# Patient Record
Sex: FEMALE | Race: WHITE | NOT HISPANIC OR LATINO | Employment: FULL TIME | URBAN - METROPOLITAN AREA
[De-identification: names, ages, dates, MRNs, and addresses within clinical notes are randomized per-mention and may not be internally consistent; named-entity substitution may affect disease eponyms.]

---

## 2017-01-05 ENCOUNTER — TELEPHONE (OUTPATIENT)
Dept: SURGERY | Facility: CLINIC | Age: 52
End: 2017-01-05

## 2017-01-05 DIAGNOSIS — N64.4 NIPPLE PAIN: Primary | ICD-10-CM

## 2017-02-02 ENCOUNTER — TRANSCRIBE ORDERS (OUTPATIENT)
Dept: ADMINISTRATIVE | Facility: HOSPITAL | Age: 52
End: 2017-02-02

## 2017-02-02 ENCOUNTER — HOSPITAL ENCOUNTER (OUTPATIENT)
Dept: CARDIOLOGY | Facility: HOSPITAL | Age: 52
Discharge: HOME | End: 2017-02-02
Attending: INTERNAL MEDICINE | Admitting: INTERNAL MEDICINE

## 2017-02-02 DIAGNOSIS — M79.605 LEFT LEG PAIN: Primary | ICD-10-CM

## 2017-02-02 DIAGNOSIS — M79.605 LEFT LEG PAIN: ICD-10-CM

## 2017-02-02 LAB
BH CV LOWER VASCULAR LEFT COMMON FEMORAL AUGMENT: NORMAL
BH CV LOWER VASCULAR LEFT COMMON FEMORAL COMPETENT: NORMAL
BH CV LOWER VASCULAR LEFT COMMON FEMORAL COMPRESS: NORMAL
BH CV LOWER VASCULAR LEFT COMMON FEMORAL PHASIC: NORMAL
BH CV LOWER VASCULAR LEFT COMMON FEMORAL SPONT: NORMAL
BH CV LOWER VASCULAR LEFT DISTAL FEMORAL COMPRESS: NORMAL
BH CV LOWER VASCULAR LEFT GASTRONEMIUS COMPRESS: NORMAL
BH CV LOWER VASCULAR LEFT GREATER SAPH AK COMPRESS: NORMAL
BH CV LOWER VASCULAR LEFT GREATER SAPH BK COMPRESS: NORMAL
BH CV LOWER VASCULAR LEFT LESSER SAPH COMPRESS: NORMAL
BH CV LOWER VASCULAR LEFT MID FEMORAL AUGMENT: NORMAL
BH CV LOWER VASCULAR LEFT MID FEMORAL COMPETENT: NORMAL
BH CV LOWER VASCULAR LEFT MID FEMORAL COMPRESS: NORMAL
BH CV LOWER VASCULAR LEFT MID FEMORAL PHASIC: NORMAL
BH CV LOWER VASCULAR LEFT MID FEMORAL SPONT: NORMAL
BH CV LOWER VASCULAR LEFT PERONEAL COMPRESS: NORMAL
BH CV LOWER VASCULAR LEFT POPLITEAL AUGMENT: NORMAL
BH CV LOWER VASCULAR LEFT POPLITEAL COMPETENT: NORMAL
BH CV LOWER VASCULAR LEFT POPLITEAL COMPRESS: NORMAL
BH CV LOWER VASCULAR LEFT POPLITEAL PHASIC: NORMAL
BH CV LOWER VASCULAR LEFT POPLITEAL SPONT: NORMAL
BH CV LOWER VASCULAR LEFT POSTERIOR TIBIAL COMPRESS: NORMAL
BH CV LOWER VASCULAR LEFT PROXIMAL FEMORAL COMPRESS: NORMAL
BH CV LOWER VASCULAR LEFT SAPHENOFEMORAL JUNCTION AUGMENT: NORMAL
BH CV LOWER VASCULAR LEFT SAPHENOFEMORAL JUNCTION COMPETENT: NORMAL
BH CV LOWER VASCULAR LEFT SAPHENOFEMORAL JUNCTION COMPRESS: NORMAL
BH CV LOWER VASCULAR LEFT SAPHENOFEMORAL JUNCTION PHASIC: NORMAL
BH CV LOWER VASCULAR LEFT SAPHENOFEMORAL JUNCTION SPONT: NORMAL
BH CV LOWER VASCULAR RIGHT COMMON FEMORAL AUGMENT: NORMAL
BH CV LOWER VASCULAR RIGHT COMMON FEMORAL COMPETENT: NORMAL
BH CV LOWER VASCULAR RIGHT COMMON FEMORAL COMPRESS: NORMAL
BH CV LOWER VASCULAR RIGHT COMMON FEMORAL PHASIC: NORMAL
BH CV LOWER VASCULAR RIGHT COMMON FEMORAL SPONT: NORMAL

## 2017-02-02 PROCEDURE — 93971 EXTREMITY STUDY: CPT

## 2017-02-07 ENCOUNTER — TELEPHONE (OUTPATIENT)
Dept: SURGERY | Facility: CLINIC | Age: 52
End: 2017-02-07

## 2017-02-08 ENCOUNTER — OFFICE VISIT (OUTPATIENT)
Dept: SURGERY | Facility: CLINIC | Age: 52
End: 2017-02-08

## 2017-02-08 VITALS
BODY MASS INDEX: 32.87 KG/M2 | OXYGEN SATURATION: 99 % | DIASTOLIC BLOOD PRESSURE: 81 MMHG | TEMPERATURE: 97.9 F | WEIGHT: 178.6 LBS | HEIGHT: 62 IN | SYSTOLIC BLOOD PRESSURE: 133 MMHG | HEART RATE: 91 BPM

## 2017-02-08 DIAGNOSIS — Z80.3 FH: BREAST CANCER: ICD-10-CM

## 2017-02-08 DIAGNOSIS — N60.19 DIFFUSE CYSTIC MASTOPATHY, UNSPECIFIED LATERALITY: ICD-10-CM

## 2017-02-08 DIAGNOSIS — N60.29 FIBROADENOSIS OF BREAST, UNSPECIFIED LATERALITY: ICD-10-CM

## 2017-02-08 DIAGNOSIS — N64.52 NIPPLE DISCHARGE: Primary | ICD-10-CM

## 2017-02-08 PROCEDURE — 99214 OFFICE O/P EST MOD 30 MIN: CPT | Performed by: SURGERY

## 2017-02-08 NOTE — PROGRESS NOTES
"Chief Complaint: Kacey Garcia is a 51 y.o. female who was seen in consultation at the request of Kayy Stahl MD  for nipple discharge    History of Present Illness:  Ms Garcia has a significant family history of breast cancer, including her mother diagnosed at age 58 and her paternal grandmother diagnosed at age 45.  She has had 2 breast biopsies in the past. The first was in 2008, LEFT breast,  by Dr Russell at [facility] and benign;  and the most recent biopsy was performed 1/8/2009 at [facility] by Dr Christiano Castrejon, for a complex cystic lesion at the 12:00 RIGHT breast location.  The pathology from this returned as cystic apocrine metaplasia with increased fibrosis.   She had her annual mammogram 2/2/10 at [facility] that showed in the RIGHT breast, just lateral to the marker from the most recent biopsy, a circumscribed nodule 7mm in diameter, new compared to prior- read as BR0.      She has noted no masses in either breast, no skin changes, no nipple changes.  She does note some \"knottiness\" in the RIGHT UOQ that fluctuates in consistency and size.  SHe had additional imaging the day of her intiial visit that showed on the mammogram that the RIGHT mammo lesion compressed out, and on ultraosund, clustered microcysts at the 12:00 location.     Mrs. Garcia underwent a breast MRI on 9-17-10  that showed, in comparison to the 1-18-08 MRI-- reidentification of a 10x8mm oval well circumscribed nodule RIGHT breast 9:00, unchanged or possibly smaller, with benign imaging features likely a LN or FA. Ther eis a second smaller but similarly enhancing nosdule at the 9:00 lcoation posterior and superior to the preexisting nodule, also has benign features. Rec correlation with targeted ultrasound.  LEFT no findings.  She then had an ultraosund today of the RIGHT breast, that showed 2 aeparate solid lesions at the 9:00 location,  by about 5 mm, that correlate to 2 findings from her MRI from 9-2010. THe first  stable on MRI, and " 1.1 cm in size; the second new on the 9-2010 MRI, 9mm in size. Both with brenign imaging features oval circumscribed, likely fibroadenomas, BR3, rec 6 month FU ultrasound. Cash.  She has noted no new masses, skin changes, nipple changes, nipple discharge either breast.    Since our last visit, Mrs. Garcia has done well. She reports no new masses, skin changes, nipple changes, nipple discahrge from either breast. SHe does report some tenderness RIGHT breast laterally when she has stress. Her bilateral mammgoram and RIGHT US at [facility] from 2-21-11 showed 2 stable masses in the RIGHT breast at the 9:00 location, 5 CFN, imaging consistent with benign FA. Rec area reevaluated in 6months to ensure stability.  BR3.   Also rec continued MRI surveillance.    Since our last visit, Kacey had her imaging done, as below.  She had 2 RIGHT breast biopsies as below. Both returned as fibroadenomas. Other than tenderness related to the biopsy sites, she has noted no other changes in her exam- no masses, skin changes, nipple changes, nipple discharge.   She has gotten  since our last visit. SHe has had a 10 # weight gain related to stress.   [id number] Breast Ultrasound [facility] Kacey Garcia  Followup right breast nodule  Impression: Stable 10 mm to 11 mm nodule in the 9 o'clock position of the right breast 5 cm from the nipple. The second larger nodule has increased in size from the 02/21/2011 study and biopsy of this nodule is recommended.    11-10-11- Mrs [facility] MRI showed   Two well circumscribed enhancing masses at the 9-00      location within the right breast, one of which shows interval increase      in size since the preceding MRI dated 09/17/2010. These correspond to      solid masses shown on breast ultrasound. Evaluation with biopsy of the      enlarging mass is recommended. Biopsy of the immediately adjacent stable      mass may also be prudent since it would obviate the need for additional      imaging followup  if the mass proved to be, as expected,      histopathologically benign. There are no findings suspicious for      malignancy within the left breast.    Her US 11-7-11 showed 2 nodules at 9:00. The first is stable on  2-21-11 at 1.1x1.0 cm. The second larger lesion has enlarged to a size of  1.6x1.3 cm up from 1.2 cm in 2-2011. Biopsy of the second lesion due to enlargment recommended.     11-16-11- Her pathology returned as 9:00  FA and #2,  9:00, [facility] and FA.      Since our last visit, Kacey has done well. She has adjusted to being  and feels good. She has gained weight, but she thinks this is related to the stress of 2 jobs. She has noted no change sin her breast exam. No new masses, skin cahgnes, nipple changes, nipple discahrge eithe rbreast. Her most recent imagingi s from today 4-27-12 bilateral DX mammogram BR2 for post biopsy changes with 2 clips RIGHT. BR2.      Since our last visit, Mrs. Garcia has done well. She has noted no new masses, skin changes, nipple changes, nipple discharge either breast.   Her most recent imaging is a bilateral MRI [facility] 11-12-12 BR1 negative.  Her review of systems is unchanged other than  the above since 4-27-12.  I have reviewed the patient's Past Medical History, Family History, Social History, medications and allergies and confirm that the information is up to date and accurate.    Interval History:  Kacey has not seen us since December 2012.  She reports that in late November 2016, that she waxed her nipples 1 evening.  That night she had intercourse, and awoke the next morning with redness and warmth of the right breast and a small amount of drainage that had spotted on her T-shirt.  She has noted no additional nipple drainage.    She was treated with Keflex and her symptoms resolved.    She feels now like when she gets out of the shower the right nipple may be a little bit darker than the left.      She noted no other masses, skin changes, prior to her most recent  imaging.   Her most recent imaging includes a bilateral mammogram with 3-D 2016 at [facility] that was BI-RADS 2.  2016 a right breast ultrasound also at [facility] for right nipple discharge, showed periareolar 1-2 minimally prominent ducts, benign.    She has had 2 right breast biopsies in the past that returned as fibroadenomata and fibrocystic change    She has her uterus and ovaries, is perimenopausal, and takes no hormones.  Her family history includes the following: She has one paternal aunt and 5 maternal aunts.  One maternal aunt had breast cancer at age 73 and her mother had breast cancer age 56.  She has a paternal grandmother with breast and ovarian cancer.  Her breast cancer was diagnosed around age 57.  No other breast or ovarian cancer in her family.      She is here for evaluation.    Review of Systems:  Review of Systems   Cardiovascular: Positive for palpitations.   Psychiatric/Behavioral: Positive for sleep disturbance.   All other systems reviewed and are negative.       Past Medical and Surgical History:  Breast Biopsy History:  Patient has had the following breast biopsies:2 breast biopsies in the past- one RIGHT and one LEFT sided.  Both benign pathology per patient report.   Breast Cancer HIstory:  Patient does not have a past medical history of breast cancer.  Breast Operations, and year:  NONE   Obstetric/Gynecologic History:  Age menstrual periods began:13  Patient is premenopausal, first day of last period: 2017  Number of pregnancies:2  Number of live births: 1  Number of abortions or miscarriages: 1  Age of delivery of first child: 35  Patient breast fed, for the following lenth of time: 3 WEEKS   Length of time taking birth control pills: 1 MONTH   Patient has never taken hormone replacement  The patient still has her uterus and ovaries.      Past Surgical History   Procedure Laterality Date   •  section     • Sinus surgery     •  "Back surgery     • Breast biopsy     • Sinus surgery     • Basal cell carcinoma excision     •  section     • Disc removal     • Dilatation and curettage       Past Medical History   Diagnosis Date   • Asthma    • Basal cell carcinoma      RIGHT BREAST   • DDD (degenerative disc disease), cervical    • Sinus disease    • Varicose vein of leg    • Vitamin D deficiency        Prior Hospitalizations, other than for surgery or childbirth, and year:  NONE     Social History     Social History   • Marital status:      Spouse name: N/A   • Number of children: 1   • Years of education: N/A     Occupational History   •       Social History Main Topics   • Smoking status: Never Smoker   • Smokeless tobacco: Not on file   • Alcohol use No   • Drug use: No   • Sexual activity: Not on file     Other Topics Concern   • Not on file     Social History Narrative     Patient is .  Patient is employed full time with the following occupation:    Patient drinks 1 servings of caffeine per day.    Family History:  Family History   Problem Relation Age of Onset   • Cancer Mother      breast X 2 AGE 56-58   • Hypothyroidism Mother    • Heart defect Mother    • Depression Mother    • Breast cancer Mother    • Heart failure Father    • Asthma Father    • Breast cancer Paternal Grandmother      50S   • Ovarian cancer Paternal Grandmother    • Breast cancer Maternal Aunt 73   • Pancreatic cancer Maternal Uncle    • Prostate cancer Maternal Uncle    • Kidney cancer Maternal Uncle    • Hypothyroidism Other    • COPD Other    • Asthma Other    • Diabetes Other        Vital Signs:  Visit Vitals   • /81 (BP Location: Right arm, Patient Position: Sitting, Cuff Size: Adult)   • Pulse 91   • Temp 97.9 °F (36.6 °C) (Temporal Artery )   • Ht 62\" (157.5 cm)   • Wt 178 lb 9.6 oz (81 kg)   • SpO2 99%   • BMI 32.67 kg/m2        Medications:    Current Outpatient Prescriptions:   •  ALPRAZolam (XANAX) 1 " "MG tablet, TK 1 T PO QD PRF ANXIETY, Disp: , Rfl: 1  •  acetaminophen (TYLENOL) 325 MG tablet, Take  by mouth., Disp: , Rfl:   •  Cholecalciferol (VITAMIN D3) 53592 UNITS capsule, Take  by mouth., Disp: , Rfl:   •  cyclobenzaprine (FLEXERIL) 10 MG tablet, Take  by mouth., Disp: , Rfl:   •  ibuprofen (ADVIL,MOTRIN) 800 MG tablet, Take  by mouth., Disp: , Rfl:   •  Iron Combinations (IRON COMPLEX PO), Take  by mouth., Disp: , Rfl:   •  Triamcinolone Acetonide (NASACORT ALLERGY 24HR) 55 MCG/ACT nasal inhaler, into each nostril., Disp: , Rfl:      Allergies:  Allergies   Allergen Reactions   • Codeine Other (See Comments)     Causes severe stomach pains   • Levaquin [Levofloxacin In D5w] Other (See Comments)     Pins and needles, weakness, nausea, affects bloodwork results   • Tequin [Gatifloxacin] Other (See Comments)     Pins and needles, weakness, nausea, affects bloodwork   • Bupropion    • Citalopram    • Levofloxacin    • Penicillins        Physical Examination:  Visit Vitals   • /81 (BP Location: Right arm, Patient Position: Sitting, Cuff Size: Adult)   • Pulse 91   • Temp 97.9 °F (36.6 °C) (Temporal Artery )   • Ht 62\" (157.5 cm)   • Wt 178 lb 9.6 oz (81 kg)   • SpO2 99%   • BMI 32.67 kg/m2     General Appearance:  Patient is in no distress.  She is well kept and has an  overweight  build.   Psychiatric:  Patient with appropriate mood and affect. Alert and oriented to self, time, and place.    Breast, RIGHT: large  sized, symmetric with the contralateral side.  Breast skin is without erythema, edema, rashes.  There is prominent hair centrally on each breast that is symmetric.  There are no visible abnormalities upon inspection during the arm-raising maneuver or with hands on hips in the sitting position. There is no nipple retraction, discharge or nipple/areolar skin changes. Specifically with much effort there is no nipple discharge today.  There is no asymmetry between the 2 nipple areolar complexes in " terms of size or color.  There are no masses palpable in the sitting or supine positions. There is a well healed curvilinear incision in the LIQ from a past excision of a basal cell carcinoma.     Breast, LEFT:  large  sized, symmetric with the contralateral side.  Breast skin is without erythema, edema, rashes. There is prominent hair centrally on each breast that is symmetric.  There are no visible abnormalities upon inspection during the arm-raising maneuver or with hands on hips in the sitting position. There is no nipple retraction, discharge or nipple/areolar skin changes.There are no masses palpable in the sitting or supine positions.     Lymphatic:  There is no axillary, cervical, infraclavicular, or supraclavicular adenopathy bilaterally.  Eyes:  Pupils are round and reactive to light.  Cardiovascular:  Heart rate and rhythm are regular.  Respiratory:  Lungs are clear bilaterally with no crackles or wheezes in any lung field.  Gastrointestinal:  Abdomen is soft, nondistended, and nontender. There are no scars from previous surgery.   Musculoskeletal:  Good strength in all 4 extremities.  There is good range of motion in both shoulders.   Skin:  No new skin lesions or rashes on the skin excluding the breast (see breast exam above).    Imagin-4-10 additional diagnostic views and a targetted RIGHT US BHE to further evaluate the RIGHT breast nodule seen on mammo- showed the RIGHT lesion compressed  out on diagnostic views, suggesting that there is not a true mass on her screening mammogram.  On targetted US, there were clustered microcysts around the 12:00 location, at the site of her prior biopsy.  No solid lesions or macrocysts. BR2.    Breast MRI on 9-17-10  that showed, in comparison to the 08 MRI-- reidentification of a 10x8mm oval well circumscribed nodule RIGHT breast 9:00, unchanged or possibly smaller, with benign imaging features likely a LN or FA. Ther eis a second smaller but similarly  enhancing nosdule at the 9:00 lcoation posterior and superior to the preexisting nodule, also has benign features. Rec correlation with targeted ultrasound.  LEFT no findings.  Ultrasound 10-11-10 of the RIGHT breast, that showed 2 separate solid lesions at the 9:00 location,  by about 5 mm, that correlate to 2 findings from her MRI from 9-2010. THe first  stable on MRI, and 1.1 cm in size; the second new on the 9-2010 MRI, 9mm in size. Both with brenign imaging features oval circumscribed, likely fibroadenomas, BR3, rec 6 month FU ultrasound. Cash.    Bilateral mammgoram and RIGHT US at [facility] from 2-21-11 showed 2 stable masses in the RIGHT breast at the 9:00 location, 5 CFN, imaging consistent with benign FA. Rec area reevaluated in 6months to ensure stability.  BR3.     11-10-11- Mrs [facility] MRI showed   Two well circumscribed enhancing masses at the 9-00      location within the right breast, one of which shows interval increase      in size since the preceding MRI dated 09/17/2010. These correspond to      solid masses shown on breast ultrasound. Evaluation with biopsy of the      enlarging mass is recommended. Biopsy of the immediately adjacent stable      mass may also be prudent since it would obviate the need for additional      imaging followup if the mass proved to be, as expected,      histopathologically benign. There are no findings suspicious for      malignancy within the left breast.    Her US 11-7-11 showed 2 nodules at 9:00. The first is stable on  2-21-11 at 1.1x1.0 cm. The second larger lesion has enlarged to a size of  1.6x1.3 cm up from 1.2 cm in 2-2011. Biopsy of the second lesion due to enlargment recommended.     04-27-12 Bilateral diagnostic mammogram [facility]  There are two metallic clips seen at the 9 o'clock position within the right breast biopsy-proven fibroadenoma. A metallic clip medial left breast as well as within the right breast at the 12 o'clock position.   Impression:  There are no findings suspicious for malignancy in either breast.   BIRADS Category 2: Benign     11-12-12     Bilateral Breast MRI     [facility]    Kacey Willson  47 year-old female with right breast pain.  IMPRESSION:  There are no finding suspicious for malignancy in either breast.  No abnormality ot substantiate and/or explain the patient's right breast discomfort  Birads Category 1:  Negative    07-       [facility]    DIAGNOSTIC BILATERAL MAMMOGRAM                      KACEY WILLSON  HISTORY: Right lateral breast pain.  Moderately dense there is some mild skin retraction in the lateral aspect of the right breast.  IMPRESSION:  The ultrasound shows a hypoechoic nodule which may have represented a previously biopsied nodule and has relatively benign sonographic characteristics however short-term follow-up ultrasound of the right breast in approximately 3 months to 4 months recommended.  BIRADS 3    07-         [facility]             RIGHT BREAST ULTRASOUND                                KACEY WILLSON  9 o’clock to 10 o’clock right breast hypoechoic nodule measuring up to 1.6 cm x 1.1 cm x 5 mm. Patient had a nodule biopsied in this region on 11/14/2011 representing a fibroadenoma.  IMPRESSION:  Probably benign nodule in the 9 o’clock position of the right breast as described. Recommend follow-up right breast ultrasound 3 months to 4 months.      12-                [facility]     US                         RIGHT BREAST ULTRASOUND    KACEY WILLSON  HISTORY: Nonbloody right nipple discharge.  FINDINGS: Periareolar and retroareolar regions. 1 or 2 minimally prominent ducts are seen. No solid or cystic masses.    12-09-16                          DR MARION ZHU                   EXTERNAL RESULT SCAN               ROSA VIDES  Mammogram and breast ultrasound are normal. She was treated for mastitis.      Pathology:    11-16-11- 2X RIGHT breast biopsies for breast  masses enlarging -  9:00  FA and #2,  9:00, FCC and FA    Procedures:      Assessment:   Diagnosis Plan   1. Nipple discharge     2. FH: breast cancer  Ambulatory Referral to Genetics   3. Fibroadenosis of breast, unspecified laterality     4. Diffuse cystic mastopathy, unspecified laterality     1-  RIGHT, single episode, stimulated    2-  Mother age 56, 1/5 MA age 73  PGM breast age 57, ovarian age uncertain      3-4  11-16-11- 2X RIGHT breast biopsies for breast masses enlarging -  9:00  FA and #2,  9:00, FCC and FA        Plan:  1-We reviewed physiologic versus pathologic nipple discharge today.  We reviewed that the typical findings of physiologic discharge include intermittent, bilateral, nonspontaneous, green/brown/creamy drainage from multiple ducts. We discussed that the typical findings of pathologic discharge include bloody, clear, or serous, from a single breast, single duct that is spontaneous, persistent. We discussed that the etiologies that was concern ourselves with for the latter are intraductal papilloma and, less commonly, breast cancer. We discussed the importance of exam and imaging with onset of a new symptoms.   Her imaging showed the following:  No intraductal masses on US and no mammographic abnormality     I reassured her that her history is consistent with physiologic discharge. Stimulated, single episode.  I asked her to please let us know if she has any spontaneous persistent discharge from either breast.    2-  With regards to her concern about the nipple asymmetry, I reassured her that on examination today her nipples look symmetric and neither one is with abnormality.    3-  With regards her family history of breast cancer breast cancer risk,    I calculated her lifetime risk of breast cancer using the Tyrer-Cuzick model as: 26.5%  I calculated her lifetime risk of breast cancer using the Milagro model (not useful for women under the age of 35 years) as: 3,5%  With a lifetime  risk of breast cancer greater than 20%, the current recommendations for screening include annual mammography and annual MRI, with clinical examination.     She is interested in pursuing this, but has in the past had difficulty with insurance coverage. I have told her today that if she is interested in the future,that her risk is over the 20% cutoff where it is usually covered by most insurance. She tells me that she will check with Dr Stahl about this at the time of her next mammogram.     We also discussed that for a Milagro model 5 year risk greater than 1.7% or LCIS, and a life expectancy > 10 years, that we offer the option of risk reduction counseling with the medical oncologists about chemopreventive agents such as tamoxifen, raloxifene, or exemestane.  She was not interested in pursuing this at this time.    We did discuss that her family history may warrant genetic testing, due to 2 close members on her mothers side having breast cancer and her PGM having both breast and ovarian cancer.  She was interested in this consultation, so we will arrange.    I have not given her a routine FU in our office, but asked her to continue her SBE monthly in addition to her annual CBE and mammogram +/_ MRI.  I asked her to call in the future with concerns and we'd be happy to see her back.        Anna Duenas MD        We have spent 30 minutes in visit today, 20 in face to face .      Next Appointment:  Return for any future concerns.      EMR Dragon/transcription disclaimer:    Much of this encounter note is an electronic transcription/translocation of spoken language to printed text.  The electronic translation of spoken language may permit erroneous, or at times, nonsensical words or phrases to be inadvertently transcribed.  Although I have reviewed the note from such areas, some may still exist.

## 2017-03-21 ENCOUNTER — CLINICAL SUPPORT (OUTPATIENT)
Dept: OTHER | Facility: HOSPITAL | Age: 52
End: 2017-03-21

## 2017-03-21 DIAGNOSIS — Z80.41 FAMILY HISTORY OF MALIGNANT NEOPLASM OF OVARY: ICD-10-CM

## 2017-03-21 DIAGNOSIS — Z80.0 FAMILY HISTORY OF MALIGNANT NEOPLASM OF GASTROINTESTINAL TRACT: ICD-10-CM

## 2017-03-21 DIAGNOSIS — Z80.3 FAMILY HISTORY OF MALIGNANT NEOPLASM OF BREAST: Primary | ICD-10-CM

## 2017-03-21 PROCEDURE — G0463 HOSPITAL OUTPT CLINIC VISIT: HCPCS

## 2017-03-21 NOTE — PROGRESS NOTES
Labs drawn per left wrist area vein using a 21 gauge butterfly. Sterile 2 x 2 applied. [facility]

## 2017-05-16 ENCOUNTER — CLINICAL SUPPORT (OUTPATIENT)
Dept: OTHER | Facility: HOSPITAL | Age: 52
End: 2017-05-16

## 2017-05-16 DIAGNOSIS — Z80.0 FAMILY HISTORY OF MALIGNANT NEOPLASM OF GASTROINTESTINAL TRACT: ICD-10-CM

## 2017-05-16 DIAGNOSIS — Z80.41 FAMILY HISTORY OF MALIGNANT NEOPLASM OF OVARY: ICD-10-CM

## 2017-05-16 DIAGNOSIS — Z80.3 FAMILY HISTORY OF MALIGNANT NEOPLASM OF BREAST: Primary | ICD-10-CM

## 2017-05-16 PROCEDURE — G0463 HOSPITAL OUTPT CLINIC VISIT: HCPCS

## 2017-05-17 ENCOUNTER — TELEPHONE (OUTPATIENT)
Dept: SURGERY | Facility: CLINIC | Age: 52
End: 2017-05-17

## 2017-05-17 DIAGNOSIS — Z15.01 BRCA2 GENETIC CARRIER: Primary | ICD-10-CM

## 2017-05-17 DIAGNOSIS — Z15.09 BRCA2 GENETIC CARRIER: Primary | ICD-10-CM

## 2017-05-18 ENCOUNTER — HOSPITAL ENCOUNTER (OUTPATIENT)
Dept: MRI IMAGING | Facility: HOSPITAL | Age: 52
Discharge: HOME | End: 2017-05-18
Attending: SURGERY | Admitting: SURGERY

## 2017-05-18 DIAGNOSIS — Z15.01 BRCA2 GENETIC CARRIER: ICD-10-CM

## 2017-05-18 DIAGNOSIS — Z15.09 BRCA2 GENETIC CARRIER: ICD-10-CM

## 2017-05-18 PROCEDURE — 0159T HC MRI BREAST COMPUTER ANALYSIS: CPT

## 2017-05-18 PROCEDURE — 0 GADOBENATE DIMEGLUMINE 529 MG/ML SOLUTION: Performed by: SURGERY

## 2017-05-18 PROCEDURE — A9577 INJ MULTIHANCE: HCPCS | Performed by: SURGERY

## 2017-05-18 PROCEDURE — C8908 MRI W/O FOL W/CONT, BREAST,: HCPCS

## 2017-05-18 RX ADMIN — GADOBENATE DIMEGLUMINE 15 ML: 529 INJECTION, SOLUTION INTRAVENOUS at 08:56

## 2017-05-23 ENCOUNTER — TELEPHONE (OUTPATIENT)
Dept: SURGERY | Facility: CLINIC | Age: 52
End: 2017-05-23

## 2017-05-24 ENCOUNTER — HOSPITAL ENCOUNTER (OUTPATIENT)
Dept: ULTRASOUND IMAGING | Facility: HOSPITAL | Age: 52
Discharge: HOME | End: 2017-05-24
Attending: SURGERY

## 2017-05-24 ENCOUNTER — HOSPITAL ENCOUNTER (OUTPATIENT)
Dept: ULTRASOUND IMAGING | Facility: HOSPITAL | Age: 52
Discharge: HOME | End: 2017-05-24
Attending: SURGERY | Admitting: SURGERY

## 2017-05-24 DIAGNOSIS — Z15.09 BRCA2 GENETIC CARRIER: ICD-10-CM

## 2017-05-24 DIAGNOSIS — Z15.01 BRCA2 GENETIC CARRIER: ICD-10-CM

## 2017-05-24 PROCEDURE — 76642 ULTRASOUND BREAST LIMITED: CPT

## 2017-05-25 ENCOUNTER — OFFICE VISIT (OUTPATIENT)
Dept: SURGERY | Facility: CLINIC | Age: 52
End: 2017-05-25

## 2017-05-25 ENCOUNTER — TELEPHONE (OUTPATIENT)
Dept: SURGERY | Facility: CLINIC | Age: 52
End: 2017-05-25

## 2017-05-25 VITALS
DIASTOLIC BLOOD PRESSURE: 78 MMHG | SYSTOLIC BLOOD PRESSURE: 109 MMHG | HEIGHT: 62 IN | OXYGEN SATURATION: 98 % | BODY MASS INDEX: 28.74 KG/M2 | TEMPERATURE: 98.4 F | HEART RATE: 89 BPM | WEIGHT: 156.2 LBS

## 2017-05-25 DIAGNOSIS — Z80.41 FH: OVARIAN CANCER: ICD-10-CM

## 2017-05-25 DIAGNOSIS — R92.8 ABNORMAL ULTRASOUND OF BREAST: ICD-10-CM

## 2017-05-25 DIAGNOSIS — Z15.09 BRCA2 GENETIC CARRIER: Primary | ICD-10-CM

## 2017-05-25 DIAGNOSIS — Z80.3 FH: BREAST CANCER: ICD-10-CM

## 2017-05-25 DIAGNOSIS — Z15.01 BRCA2 GENETIC CARRIER: Primary | ICD-10-CM

## 2017-05-25 DIAGNOSIS — D24.1 FIBROADENOMA OF BREAST, RIGHT: ICD-10-CM

## 2017-05-25 DIAGNOSIS — R92.8 ABNORMAL MRI, BREAST: ICD-10-CM

## 2017-05-25 PROBLEM — D24.9 FIBROADENOMA OF BREAST: Status: ACTIVE | Noted: 2017-05-25

## 2017-05-25 PROCEDURE — 99214 OFFICE O/P EST MOD 30 MIN: CPT | Performed by: SURGERY

## 2017-06-13 ENCOUNTER — TELEPHONE (OUTPATIENT)
Dept: SURGERY | Facility: CLINIC | Age: 52
End: 2017-06-13

## 2017-06-13 NOTE — TELEPHONE ENCOUNTER
Note from Dr. Kayy Stahl dated June 12, 2017 discussed risk reducing oophorectomy.  She is going to get a pelvic ultrasound and as long as her ovaries appear normal she will follow them every 6 months and do surgery at her earliest decision.    Note from Dr. Kenny Hall June 5, 2017 he is seeing Mrs. Garcia and they discussed bilateral tissue expander implant reconstruction with subsequent 3Dnipple tattooing.

## 2017-06-20 ENCOUNTER — OFFICE VISIT (OUTPATIENT)
Dept: SURGERY | Facility: CLINIC | Age: 52
End: 2017-06-20

## 2017-06-20 VITALS
SYSTOLIC BLOOD PRESSURE: 111 MMHG | OXYGEN SATURATION: 99 % | HEIGHT: 62 IN | DIASTOLIC BLOOD PRESSURE: 78 MMHG | TEMPERATURE: 98.5 F | BODY MASS INDEX: 28.34 KG/M2 | HEART RATE: 64 BPM | WEIGHT: 154 LBS

## 2017-06-20 DIAGNOSIS — R92.8 ABNORMAL ULTRASOUND OF BREAST: Primary | ICD-10-CM

## 2017-06-20 PROCEDURE — 99214 OFFICE O/P EST MOD 30 MIN: CPT | Performed by: SURGERY

## 2017-06-20 NOTE — PROGRESS NOTES
"Chief Complaint: Kacey Garcia is a 51 y.o. female who was seen in consultation at the request of Kayy Stahl MD  for BRCA2 genetic carrier, Abnormal MRI, Breast, Abnormal ultrasound, Breast, FH: Breast cancer, FH: Ovarian cancer, Fibroadenoma of breast, right    History of Present Illness:  Ms Garcia has a significant family history of breast cancer, including her mother diagnosed at age 58 and her paternal grandmother diagnosed at age 45.  She has had 2 breast biopsies in the past. The first was in 2008, LEFT breast,  by Dr Russell at [facility] and benign;  and the most recent biopsy was performed 1/8/2009 at [facility] by Dr Christiano Castrejon, for a complex cystic lesion at the 12:00 RIGHT breast location.  The pathology from this returned as cystic apocrine metaplasia with increased fibrosis.   She had her annual mammogram 2/2/10 at [facility] that showed in the RIGHT breast, just lateral to the marker from the most recent biopsy, a circumscribed nodule 7mm in diameter, new compared to prior- read as BR0.      She has noted no masses in either breast, no skin changes, no nipple changes.  She does note some \"knottiness\" in the RIGHT UOQ that fluctuates in consistency and size.  SHe had additional imaging the day of her intiial visit that showed on the mammogram that the RIGHT mammo lesion compressed out, and on ultraosund, clustered microcysts at the 12:00 location.     Mrs. Garcia underwent a breast MRI on 9-17-10  that showed, in comparison to the 1-18-08 MRI-- reidentification of a 10x8mm oval well circumscribed nodule RIGHT breast 9:00, unchanged or possibly smaller, with benign imaging features likely a LN or FA. Ther eis a second smaller but similarly enhancing nosdule at the 9:00 lcoation posterior and superior to the preexisting nodule, also has benign features. Rec correlation with targeted ultrasound.  LEFT no findings.  She then had an ultraosund today of the RIGHT breast, that showed 2 aeparate solid lesions at the " 9:00 location,  by about 5 mm, that correlate to 2 findings from her MRI from 9-2010. THe first  stable on MRI, and 1.1 cm in size; the second new on the 9-2010 MRI, 9mm in size. Both with brenign imaging features oval circumscribed, likely fibroadenomas, BR3, rec 6 month FU ultrasound. Cash.  She has noted no new masses, skin changes, nipple changes, nipple discharge either breast.    Since our last visit, Mrs. Garcia has done well. She reports no new masses, skin changes, nipple changes, nipple discahrge from either breast. SHe does report some tenderness RIGHT breast laterally when she has stress. Her bilateral mammgoram and RIGHT US at [facility] from 2-21-11 showed 2 stable masses in the RIGHT breast at the 9:00 location, 5 CFN, imaging consistent with benign FA. Rec area reevaluated in 6months to ensure stability.  BR3.   Also rec continued MRI surveillance.    Since our last visit, Kacey had her imaging done, as below.  She had 2 RIGHT breast biopsies as below. Both returned as fibroadenomas. Other than tenderness related to the biopsy sites, she has noted no other changes in her exam- no masses, skin changes, nipple changes, nipple discharge.   She has gotten  since our last visit. SHe has had a 10 # weight gain related to stress.   [id number] Breast Ultrasound [facility] Kacey Garcia  Followup right breast nodule  Impression: Stable 10 mm to 11 mm nodule in the 9 o'clock position of the right breast 5 cm from the nipple. The second larger nodule has increased in size from the 02/21/2011 study and biopsy of this nodule is recommended.    11-10-11- Mrs Hoyos MRI showed   Two well circumscribed enhancing masses at the 9-00      location within the right breast, one of which shows interval increase      in size since the preceding MRI dated 09/17/2010. These correspond to      solid masses shown on breast ultrasound. Evaluation with biopsy of the      enlarging mass is recommended. Biopsy of the  immediately adjacent stable      mass may also be prudent since it would obviate the need for additional      imaging followup if the mass proved to be, as expected,      histopathologically benign. There are no findings suspicious for      malignancy within the left breast.    Her US 11-7-11 showed 2 nodules at 9:00. The first is stable on  2-21-11 at 1.1x1.0 cm. The second larger lesion has enlarged to a size of  1.6x1.3 cm up from 1.2 cm in 2-2011. Biopsy of the second lesion due to enlargment recommended.     11-16-11- Her pathology returned as 9:00  FA and #2,  9:00, [facility] and FA.      Since our last visit, Kacey has done well. She has adjusted to being  and feels good. She has gained weight, but she thinks this is related to the stress of 2 jobs. She has noted no change sin her breast exam. No new masses, skin cahgnes, nipple changes, nipple discahrge eithe rbreast. Her most recent imagingi s from today 4-27-12 bilateral DX mammogram BR2 for post biopsy changes with 2 clips RIGHT. BR2.      Since our last visit, Mrs. Garcia has done well. She has noted no new masses, skin changes, nipple changes, nipple discharge either breast.   Her most recent imaging is a bilateral MRI [facility] 11-12-12 BR1 negative.  Her review of systems is unchanged other than  the above since 4-27-12.  I have reviewed the patient's Past Medical History, Family History, Social History, medications and allergies and confirm that the information is up to date and accurate.      Kacey has not seen us since December 2012.  She reports that in late November 2016, that she waxed her nipples 1 evening.  That night she had intercourse, and awoke the next morning with redness and warmth of the right breast and a small amount of drainage that had spotted on her T-shirt.  She has noted no additional nipple drainage.    She was treated with Keflex and her symptoms resolved.    She feels now like when she gets out of the shower the right nipple may  be a little bit darker than the left.      She noted no other masses, skin changes, prior to her most recent imaging.   Her most recent imaging includes a bilateral mammogram with 3-D December 14, 2016 at [facility] that was BI-RADS 2.  December 9, 2016 a right breast ultrasound also at [facility] for right nipple discharge, showed periareolar 1-2 minimally prominent ducts, benign.    She has had 2 right breast biopsies in the past that returned as fibroadenomata and fibrocystic change    She has her uterus and ovaries, is perimenopausal, and takes no hormones.  Her family history includes the following: She has one paternal aunt and 5 maternal aunts.  One maternal aunt had breast cancer at age 73 and her mother had breast cancer age 56.  She has a paternal grandmother with breast and ovarian cancer.  Her breast cancer was diagnosed around age 57.  No other breast or ovarian cancer in her family.        In the interim,  Kacey Garcia  has done well.  She has noted no changes in her breast exam. No new masses, skin changes, nipple changes, nipple discharge either breast.     Upon our advice, Kacey saw genetics and had [facility] genetics testing 3-21-17 of BRCA1-2 analysis with cancer next- showed a BRCA2 mutation [id number].     I arranged for her to have a breast MRI.  [facility] May 18, 2017 bilateral breast MRI. Right breast 9:30 bowtie shaped metallic clip- not the ribbon shaped marker which is located more anterior and inferior and not the third marker that is more central and 12:00. from surrounding enhancement that measures 1.2 cm in greatest dimension. Recommend stereotactic guided biopsy of the clip and subsequent surrounding enhancement. No findings suspicious on the left.     I reviewed the procedure notes from January 2009 in November 2011. In 2009 a barbed or knot shaped marker was left at the 12:00 location and the pathology returned as cystic apically metaplasia with dense  stromal fibrosis and was felt concordant. In November 2011 a right breast biopsy of 2 masses at 9:00 were done and both masses returned as fibroadenoma. The bowtie clip was posterior-superior in the ribbon clip was anterior inferior both at 9:00. I discussed the MRI results with Dr. Castrejon today. He felt that a second look ultrasound may be the next best step since the area of enhancement at 9:00 had increased T2 signal and this is favorable and he is previously sampled the masses. Therefore we arranged for her had a right breast Second Look ultrasound.    This was done yesterday May 24, 2017 and showed macrolobulated hypoechoic mass with an internal metallic clip 9:30 right breast 5 cm from the nipple.  Most consistent with fibroadenoma.  Consistent with a recurrent fibroadenoma previously biopsied.  6 month follow-up is recommended.  BI-RADS 3.    Her most recent mammogram was December 14, 2016 no radiographic evidence of malignancy.  BI-RADS 2.    She has had an intentional 22-1/2 pound weight loss.    Interval History:  In the interim, Kacey has talked with Dr. Hall about reconstruction and they have decided to proceed with expander reconstruction at the time of her surgery.  She has also discussed oophorectomy with Dr. Stahl who has deferred the timing to Kacey's preference.    She denies any changes in her exam.    Review of Systems:  Review of Systems   Constitutional: Negative for unexpected weight change (2 lb wt loss).   Cardiovascular: Positive for palpitations.   Psychiatric/Behavioral: Positive for sleep disturbance.   All other systems reviewed and are negative.       Past Medical and Surgical History:  Breast Biopsy History:  Patient has had the following breast biopsies:2 breast biopsies in the past- one RIGHT and one LEFT sided.  Both benign pathology per patient report.   Breast Cancer HIstory:  Patient does not have a past medical history of breast cancer.  Breast Operations, and year:  NONE    Obstetric/Gynecologic History:  Age menstrual periods began:13  Patient is premenopausal, first day of last period: 2017  Number of pregnancies:2  Number of live births: 1  Number of abortions or miscarriages: 1  Age of delivery of first child: 35  Patient breast fed, for the following lenth of time: 3 WEEKS   Length of time taking birth control pills: 1 MONTH   Patient has never taken hormone replacement  The patient still has her uterus and ovaries.      Past Surgical History:   Procedure Laterality Date   • BACK SURGERY     • BASAL CELL CARCINOMA EXCISION     • BREAST BIOPSY     •  SECTION     •  SECTION     • DILATATION AND CURETTAGE     • DISC REMOVAL     • SINUS SURGERY     • SINUS SURGERY       Past Medical History:   Diagnosis Date   • Asthma    • Basal cell carcinoma     RIGHT BREAST   • BRCA gene positive     BRCA2   • DDD (degenerative disc disease), cervical    • Sinus disease    • Varicose vein of leg    • Visit for genetic screening    • Vitamin D deficiency        Prior Hospitalizations, other than for surgery or childbirth, and year:  NONE     Social History     Social History   • Marital status:      Spouse name: N/A   • Number of children: 1   • Years of education: N/A     Occupational History   •       Social History Main Topics   • Smoking status: Never Smoker   • Smokeless tobacco: Not on file   • Alcohol use No   • Drug use: No   • Sexual activity: Not on file     Other Topics Concern   • Not on file     Social History Narrative     Patient is .  Patient is employed full time with the following occupation:    Patient drinks 1 servings of caffeine per day.    Family History:  Family History   Problem Relation Age of Onset   • Cancer Mother      breast X 2 AGE 56-58   • Hypothyroidism Mother    • Heart defect Mother    • Depression Mother    • Breast cancer Mother    • Heart failure Father    • Asthma Father    • Breast cancer  "Paternal Grandmother      50S   • Ovarian cancer Paternal Grandmother    • Breast cancer Maternal Aunt 73   • Pancreatic cancer Maternal Uncle    • Prostate cancer Maternal Uncle    • Kidney cancer Maternal Uncle    • Hypothyroidism Other    • COPD Other    • Asthma Other    • Diabetes Other        Vital Signs:  /78 (BP Location: Left arm, Patient Position: Sitting, Cuff Size: Adult)  Pulse 64  Temp 98.5 °F (36.9 °C) (Temporal Artery )   Ht 62\" (157.5 cm)  Wt 154 lb (69.9 kg)  SpO2 99%  BMI 28.17 kg/m2     Medications:    Current Outpatient Prescriptions:   •  ALPRAZolam (XANAX) 1 MG tablet, .25 mg at bedtime, Disp: , Rfl: 1     Allergies:  Allergies   Allergen Reactions   • Codeine Other (See Comments)     Causes severe stomach pains   • Levaquin [Levofloxacin In D5w] Other (See Comments)     Pins and needles, weakness, nausea, affects bloodwork results   • Tequin [Gatifloxacin] Other (See Comments)     Pins and needles, weakness, nausea, affects bloodwork   • Bupropion    • Citalopram    • Levofloxacin    • Penicillins        Physical Examination:  /78 (BP Location: Left arm, Patient Position: Sitting, Cuff Size: Adult)  Pulse 64  Temp 98.5 °F (36.9 °C) (Temporal Artery )   Ht 62\" (157.5 cm)  Wt 154 lb (69.9 kg)  SpO2 99%  BMI 28.17 kg/m2  General Appearance:  Patient is in no distress.  She is well kept and has an  overweight  build.   Psychiatric:  Patient with appropriate mood and affect. Alert and oriented to self, time, and place.    Breast, RIGHT: large  sized, symmetric with the contralateral side.  Breast skin is without erythema, edema, rashes.  There is prominent hair centrally on each breast that is symmetric.  There are no visible abnormalities upon inspection during the arm-raising maneuver or with hands on hips in the sitting position. There is no nipple retraction, discharge or nipple/areolar skin changes. Specifically with much effort there is no nipple discharge today.    " There are no masses palpable in the sitting or supine positions. There is a well healed curvilinear incision in the LIQ from a past excision of a basal cell carcinoma.     Breast, LEFT:  large  sized, symmetric with the contralateral side.  Breast skin is without erythema, edema, rashes. There is prominent hair centrally on each breast that is symmetric.  There are no visible abnormalities upon inspection during the arm-raising maneuver or with hands on hips in the sitting position. There is no nipple retraction, discharge or nipple/areolar skin changes.There are no masses palpable in the sitting or supine positions.     Lymphatic:  There is no axillary, cervical, infraclavicular, or supraclavicular adenopathy bilaterally.  Eyes:  Pupils are round and reactive to light.  Cardiovascular:  Heart rate and rhythm are regular.  Respiratory:  Lungs are clear bilaterally with no crackles or wheezes in any lung field.  Gastrointestinal:  Abdomen is soft, nondistended, and nontender. There are no scars from previous surgery.   Musculoskeletal:  Good strength in all 4 extremities.  There is good range of motion in both shoulders.   Skin:  No new skin lesions or rashes on the skin excluding the breast (see breast exam above).    Imagin-4-10 additional diagnostic views and a targetted RIGHT US [facility] to further evaluate the RIGHT breast nodule seen on mammo- showed the RIGHT lesion compressed  out on diagnostic views, suggesting that there is not a true mass on her screening mammogram.  On targetted US, there were clustered microcysts around the 12:00 location, at the site of her prior biopsy.  No solid lesions or macrocysts. BR2.    Breast MRI on 9-17-10  that showed, in comparison to the 08 MRI-- reidentification of a 10x8mm oval well circumscribed nodule RIGHT breast 9:00, unchanged or possibly smaller, with benign imaging features likely a LN or FA. Ther eis a second smaller but similarly enhancing nosdule at the  9:00 lcoation posterior and superior to the preexisting nodule, also has benign features. Rec correlation with targeted ultrasound.  LEFT no findings.  Ultrasound 10-11-10 of the RIGHT breast, that showed 2 separate solid lesions at the 9:00 location,  by about 5 mm, that correlate to 2 findings from her MRI from 9-2010. THe first  stable on MRI, and 1.1 cm in size; the second new on the 9-2010 MRI, 9mm in size. Both with brenign imaging features oval circumscribed, likely fibroadenomas, BR3, rec 6 month FU ultrasound. Cash.    Bilateral mammgoram and RIGHT US at [facility] from 2-21-11 showed 2 stable masses in the RIGHT breast at the 9:00 location, 5 CFN, imaging consistent with benign FA. Rec area reevaluated in 6months to ensure stability.  BR3.     11-10-11- Mrs Soha MRI showed   Two well circumscribed enhancing masses at the 9-00      location within the right breast, one of which shows interval increase      in size since the preceding MRI dated 09/17/2010. These correspond to      solid masses shown on breast ultrasound. Evaluation with biopsy of the      enlarging mass is recommended. Biopsy of the immediately adjacent stable      mass may also be prudent since it would obviate the need for additional      imaging followup if the mass proved to be, as expected,      histopathologically benign. There are no findings suspicious for      malignancy within the left breast.    Her US 11-7-11 showed 2 nodules at 9:00. The first is stable on  2-21-11 at 1.1x1.0 cm. The second larger lesion has enlarged to a size of  1.6x1.3 cm up from 1.2 cm in 2-2011. Biopsy of the second lesion due to enlargment recommended.     04-27-12 Bilateral diagnostic mammogram [facility]  There are two metallic clips seen at the 9 o'clock position within the right breast biopsy-proven fibroadenoma. A metallic clip medial left breast as well as within the right breast at the 12 o'clock position.   Impression: There are no findings  suspicious for malignancy in either breast.   BIRADS Category 2: Benign     11-12-12     Bilateral Breast MRI     [facility]    Kacey Willson  47 year-old female with right breast pain.  IMPRESSION:  There are no finding suspicious for malignancy in either breast.  No abnormality ot substantiate and/or explain the patient's right breast discomfort  Birads Category 1:  Negative    07-       [facility] [place]    DIAGNOSTIC BILATERAL MAMMOGRAM                      KACEY WILLSON  HISTORY: Right lateral breast pain.  Moderately dense there is some mild skin retraction in the lateral aspect of the right breast.  IMPRESSION:  The ultrasound shows a hypoechoic nodule which may have represented a previously biopsied nodule and has relatively benign sonographic characteristics however short-term follow-up ultrasound of the right breast in approximately 3 months to 4 months recommended.  BIRADS 3    07-         [facility]             RIGHT BREAST ULTRASOUND                                KACEY WILLSON  9 o’clock to 10 o’clock right breast hypoechoic nodule measuring up to 1.6 cm x 1.1 cm x 5 mm. Patient had a nodule biopsied in this region on 11/14/2011 representing a fibroadenoma.  IMPRESSION:  Probably benign nodule in the 9 o’clock position of the right breast as described. Recommend follow-up right breast ultrasound 3 months to 4 months.      12-                [facility]     US                         RIGHT BREAST ULTRASOUND    KACEY WILLSON  HISTORY: Nonbloody right nipple discharge.  FINDINGS: Periareolar and retroareolar regions. 1 or 2 minimally prominent ducts are seen. No solid or cystic masses.    12-09-16                          DR MARION ZHU                   EXTERNAL RESULT SCAN               ROSA VIDES  Mammogram and breast ultrasound are normal. She was treated for mastitis.        May 18, 2017 -[facility]-bilateral breast MRI. Right breast 9:30  bowtie shaped metallic clip- not the ribbon shaped marker which is located more anterior and inferior and not the third marker that is more central and 12:00. from surrounding enhancement that measures 1.2 cm in greatest dimension. Recommend stereotactic guided biopsy of the clip and subsequent surrounding enhancement. No findings suspicious on the left.    5-24-17 [facility] RIGHT second look ultrasound- macrolobulated mass at 9:30 consistent with recurrent fibroadenoma (seen on MRI).  BI-RADS 3 recommend 6 month follow-up.    Pathology:      11-16-11- 2X RIGHT breast biopsies for breast masses enlarging -  9:00  FA and #2,  9:00, FCC and FA          Kacey's psychiatrist plans to discuss effexor or other anxiolytic for her during this time.    Note from Dr. Kayy Stahl dated June 12, 2017 discussed risk reducing oophorectomy. She is going to get a pelvic ultrasound and as long as her ovaries appear normal she will follow them every 6 months and do surgery at her earliest decision.     Note from Dr. Kenny Hall June 5, 2017 he is seeing Mrs. Garcia and they discussed bilateral tissue expander implant reconstruction with subsequent 3Dnipple tattooing.      Procedures:      Assessment:   Diagnosis Plan   1. Abnormal ultrasound of breast  Mammo Screening Digital Tomosynthesis Bilateral With CAD    US Breast Right Limited        1-  3-21-17 of BRCA1-2 analysis with cancer next- showed a BRCA2 mutation [id number].      2-3  RIGHT MRI-area of enhancement at 9:00 had increased T2 signal- second look ultrasound BR3 for recurring FA 5-2017      2-  Mother age 56, 1/5 MA age 73  PGM breast age 57, ovarian age uncertain      6-  1-2009- core biopsy [facility]- barbed or not shaped marker was left at the 12:00 location and the pathology returned as cystic apically metaplasia with dense stromal fibrosis and was felt concordant    11-16-11- 2X RIGHT breast biopsies for breast masses enlarging -  9:00  FA and #2,  9:00, FCC and FA- bowtie  clip was posterior-superior in the ribbon clip was anterior inferior both at 9:00.        Plan:  Today, Kacey and I discussed that the onset of breast cancer and risk of breast cancer is approximately 30% by age 50 whereas the incidence of ovarian cancer in BRCA2 carriers is less than 1% by  age 50.  The risk increased to 28% by age 70 for ovarian cancer.   We discussed that breast cancer onset is earlier than ovarian cancer onset so that we usually recommend mastectomy prior to oopherectomy.  However, I  want her to feel comfortable with the timing of the procedures, and ultimately the timing is her decision..  She is going to give this some consideration.  I gave her handout on the risk of breast and ovarian cancer stratified by age to see if this helps her in her decision making.    We discussed the procedure of bilateral total mastectomy, right axillary sentinel lymph node biopsy, right axillary node dissection possible but unlikely, and reconstruction with expanders with Dr. Hall.    We discussed proceeding with a sentinel lymph node biopsy simply because of the imaging abnormality in the right breast.    We discussed the risks of bleeding, infection, skin necrosis, injury to nerves or vessels in the axilla and lymphedema.    I will plan to see her back in December after her bilateral 3-D screening mammogram and right breast ultrasound. We discussed the RIGHT breast mass in imaging surveillance.  She knows to call in the interim if she changes her mind and wants to proceed with the above risk reducing procedure.    Her most recent mammogram  was December 14, 2016 and her most recent MRI was May 22, 2017 both at [facility].I also discussed with her that I would like to talk with her psychiatrist about potentially starting an anxiolytic medication to help her get through this very stressful period of her life and she was in agreement with that. Her psychiatrist is Dr. Linda Tesfaye.     Anna  MD Henrique        We have spent 30 minutes in visit today, 20 in face to face .      Next Appointment:  Return for Next scheduled follow up, after imaging.       EMR Dragon/transcription disclaimer:    Much of this encounter note is an electronic transcription/translocation of spoken language to printed text.  The electronic translation of spoken language may permit erroneous, or at times, nonsensical words or phrases to be inadvertently transcribed.  Although I have reviewed the note from such areas, some may still exist.

## 2017-06-21 ENCOUNTER — TELEPHONE (OUTPATIENT)
Dept: SURGERY | Facility: CLINIC | Age: 52
End: 2017-06-21

## 2017-07-30 ENCOUNTER — APPOINTMENT (OUTPATIENT)
Dept: ULTRASOUND IMAGING | Facility: HOSPITAL | Age: 52
End: 2017-07-30

## 2017-07-30 ENCOUNTER — APPOINTMENT (OUTPATIENT)
Dept: GENERAL RADIOLOGY | Facility: HOSPITAL | Age: 52
End: 2017-07-30

## 2017-07-30 ENCOUNTER — HOSPITAL ENCOUNTER (EMERGENCY)
Facility: HOSPITAL | Age: 52
Discharge: HOME | End: 2017-07-31
Attending: EMERGENCY MEDICINE | Admitting: EMERGENCY MEDICINE

## 2017-07-30 DIAGNOSIS — K80.20 CALCULUS OF GALLBLADDER WITHOUT CHOLECYSTITIS WITHOUT OBSTRUCTION: Primary | ICD-10-CM

## 2017-07-30 LAB
ALBUMIN SERPL-MCNC: 4.2 G/DL (ref 3.5–5.2)
ALBUMIN/GLOB SERPL: 1.3 G/DL
ALP SERPL-CCNC: 70 U/L (ref 39–117)
ALT SERPL W P-5'-P-CCNC: 12 U/L (ref 1–33)
ANION GAP SERPL CALCULATED.3IONS-SCNC: 13.7 MMOL/L
AST SERPL-CCNC: 13 U/L (ref 1–32)
BASOPHILS # BLD AUTO: 0.06 10*3/MM3 (ref 0–0.2)
BASOPHILS NFR BLD AUTO: 0.5 % (ref 0–1.5)
BILIRUB SERPL-MCNC: <0.2 MG/DL (ref 0.1–1.2)
BUN BLD-MCNC: 14 MG/DL (ref 6–20)
BUN/CREAT SERPL: 16.5 (ref 7–25)
CALCIUM SPEC-SCNC: 9.1 MG/DL (ref 8.6–10.5)
CHLORIDE SERPL-SCNC: 104 MMOL/L (ref 98–107)
CO2 SERPL-SCNC: 24.3 MMOL/L (ref 22–29)
CREAT BLD-MCNC: 0.85 MG/DL (ref 0.57–1)
DEPRECATED RDW RBC AUTO: 45.3 FL (ref 37–54)
EOSINOPHIL # BLD AUTO: 0.57 10*3/MM3 (ref 0–0.7)
EOSINOPHIL NFR BLD AUTO: 5.2 % (ref 0.3–6.2)
ERYTHROCYTE [DISTWIDTH] IN BLOOD BY AUTOMATED COUNT: 13.7 % (ref 11.7–13)
GFR SERPL CREATININE-BSD FRML MDRD: 71 ML/MIN/1.73
GLOBULIN UR ELPH-MCNC: 3.3 GM/DL
GLUCOSE BLD-MCNC: 127 MG/DL (ref 65–99)
HCG SERPL QL: NEGATIVE
HCT VFR BLD AUTO: 38.9 % (ref 35.6–45.5)
HGB BLD-MCNC: 12.2 G/DL (ref 11.9–15.5)
HOLD SPECIMEN: NORMAL
IMM GRANULOCYTES # BLD: 0 10*3/MM3 (ref 0–0.03)
IMM GRANULOCYTES NFR BLD: 0 % (ref 0–0.5)
LIPASE SERPL-CCNC: 28 U/L (ref 13–60)
LYMPHOCYTES # BLD AUTO: 3.8 10*3/MM3 (ref 0.9–4.8)
LYMPHOCYTES NFR BLD AUTO: 34.5 % (ref 19.6–45.3)
MCH RBC QN AUTO: 28.6 PG (ref 26.9–32)
MCHC RBC AUTO-ENTMCNC: 31.4 G/DL (ref 32.4–36.3)
MCV RBC AUTO: 91.3 FL (ref 80.5–98.2)
MONOCYTES # BLD AUTO: 0.95 10*3/MM3 (ref 0.2–1.2)
MONOCYTES NFR BLD AUTO: 8.6 % (ref 5–12)
NEUTROPHILS # BLD AUTO: 5.63 10*3/MM3 (ref 1.9–8.1)
NEUTROPHILS NFR BLD AUTO: 51.2 % (ref 42.7–76)
PLATELET # BLD AUTO: 343 10*3/MM3 (ref 140–500)
PMV BLD AUTO: 10 FL (ref 6–12)
POTASSIUM BLD-SCNC: 3.5 MMOL/L (ref 3.5–5.2)
PROT SERPL-MCNC: 7.5 G/DL (ref 6–8.5)
RBC # BLD AUTO: 4.26 10*6/MM3 (ref 3.9–5.2)
SODIUM BLD-SCNC: 142 MMOL/L (ref 136–145)
TROPONIN T SERPL-MCNC: <0.01 NG/ML (ref 0–0.03)
WBC NRBC COR # BLD: 11.01 10*3/MM3 (ref 4.5–10.7)
WHOLE BLOOD HOLD SPECIMEN: NORMAL
WHOLE BLOOD HOLD SPECIMEN: NORMAL

## 2017-07-30 PROCEDURE — 85025 COMPLETE CBC W/AUTO DIFF WBC: CPT

## 2017-07-30 PROCEDURE — 84703 CHORIONIC GONADOTROPIN ASSAY: CPT

## 2017-07-30 PROCEDURE — 83690 ASSAY OF LIPASE: CPT

## 2017-07-30 PROCEDURE — 76705 ECHO EXAM OF ABDOMEN: CPT

## 2017-07-30 PROCEDURE — 99283 EMERGENCY DEPT VISIT LOW MDM: CPT

## 2017-07-30 PROCEDURE — 84484 ASSAY OF TROPONIN QUANT: CPT

## 2017-07-30 PROCEDURE — 80053 COMPREHEN METABOLIC PANEL: CPT

## 2017-07-30 PROCEDURE — 36415 COLL VENOUS BLD VENIPUNCTURE: CPT

## 2017-07-30 PROCEDURE — 71020 HC CHEST PA AND LATERAL: CPT

## 2017-07-30 RX ORDER — SODIUM CHLORIDE 0.9 % (FLUSH) 0.9 %
10 SYRINGE (ML) INJECTION AS NEEDED
Status: DISCONTINUED | OUTPATIENT
Start: 2017-07-30 | End: 2017-07-31 | Stop reason: HOSPADM

## 2017-07-31 VITALS
BODY MASS INDEX: 26.75 KG/M2 | WEIGHT: 151 LBS | SYSTOLIC BLOOD PRESSURE: 119 MMHG | TEMPERATURE: 98.4 F | DIASTOLIC BLOOD PRESSURE: 84 MMHG | HEIGHT: 63 IN | OXYGEN SATURATION: 100 % | RESPIRATION RATE: 18 BRPM | HEART RATE: 71 BPM

## 2017-07-31 RX ORDER — ONDANSETRON 8 MG/1
8 TABLET, ORALLY DISINTEGRATING ORAL EVERY 8 HOURS PRN
Qty: 12 TABLET | Refills: 0 | Status: SHIPPED | OUTPATIENT
Start: 2017-07-31 | End: 2017-11-17

## 2017-07-31 RX ORDER — HYDROCODONE BITARTRATE AND ACETAMINOPHEN 5; 325 MG/1; MG/1
1 TABLET ORAL EVERY 6 HOURS PRN
Qty: 20 TABLET | Refills: 0 | Status: SHIPPED | OUTPATIENT
Start: 2017-07-31 | End: 2017-11-17

## 2017-08-15 ENCOUNTER — OFFICE VISIT (OUTPATIENT)
Dept: SURGERY | Facility: CLINIC | Age: 52
End: 2017-08-15

## 2017-08-15 VITALS
HEIGHT: 63 IN | DIASTOLIC BLOOD PRESSURE: 78 MMHG | OXYGEN SATURATION: 99 % | BODY MASS INDEX: 25.94 KG/M2 | HEART RATE: 67 BPM | WEIGHT: 146.4 LBS | SYSTOLIC BLOOD PRESSURE: 118 MMHG

## 2017-08-15 DIAGNOSIS — R10.13 EPIGASTRIC PAIN: Primary | ICD-10-CM

## 2017-08-15 PROCEDURE — 99243 OFF/OP CNSLTJ NEW/EST LOW 30: CPT | Performed by: SURGERY

## 2017-08-15 NOTE — PROGRESS NOTES
Subjective   Kacey Garcia is a 51 y.o. female who presents to the office in surgical consultation from Cecile Garza MD for recurrent epigastric abdominal pain.    History of Present Illness     The patient has had recurrent episodes of epigastric abdominal pain that she describes as pressure in her epigastrium just below the sternum with some radiation toward the right back.  It occurs most often after eating a salad.  There is no associated nausea or vomiting.  The symptoms also occur in the morning on an empty stomach and are improved after eating.  She had 1 episode of severe pain after drinking cold water and presented to the emergency room.  A right upper quadrant ultrasound was performed that was normal.  She was diagnosed with gastritis and started on Prilosec but had coughing spells that she attributed to the Prilosec and stopped the medicine.  She has never had an EGD.  She was recently on a Katina Velasquez diet and lost over 40 pounds.    Review of Systems   Constitutional: Positive for appetite change. Negative for activity change, fatigue and fever.   HENT: Negative for trouble swallowing and voice change.    Respiratory: Negative for chest tightness and shortness of breath.    Cardiovascular: Negative for chest pain and palpitations.   Gastrointestinal: Positive for abdominal pain. Negative for blood in stool, constipation, diarrhea, nausea and vomiting.   Endocrine: Negative for cold intolerance and heat intolerance.   Genitourinary: Negative for dysuria and flank pain.   Neurological: Negative for dizziness and light-headedness.   Hematological: Negative for adenopathy. Does not bruise/bleed easily.   Psychiatric/Behavioral: Negative for agitation and confusion.     Past Medical History:   Diagnosis Date   • Asthma    • Basal cell carcinoma     RIGHT BREAST   • BRCA gene positive     BRCA2   • DDD (degenerative disc disease), cervical    • Sinus disease    • Varicose vein of leg    • Visit for genetic  screening    • Vitamin D deficiency      Past Surgical History:   Procedure Laterality Date   • BACK SURGERY     • BASAL CELL CARCINOMA EXCISION     • BREAST BIOPSY     •  SECTION     •  SECTION     • DILATATION AND CURETTAGE     • DISC REMOVAL     • SINUS SURGERY     • SINUS SURGERY       Family History   Problem Relation Age of Onset   • Cancer Mother      breast X 2 AGE 56-58   • Hypothyroidism Mother    • Heart defect Mother    • Depression Mother    • Breast cancer Mother    • Heart failure Father    • Asthma Father    • Breast cancer Paternal Grandmother      50S   • Ovarian cancer Paternal Grandmother    • Breast cancer Maternal Aunt 73   • Pancreatic cancer Maternal Uncle    • Prostate cancer Maternal Uncle    • Kidney cancer Maternal Uncle    • Hypothyroidism Other    • COPD Other    • Asthma Other    • Diabetes Other      Social History     Social History   • Marital status:      Spouse name: N/A   • Number of children: 1   • Years of education: N/A     Occupational History   •       Social History Main Topics   • Smoking status: Never Smoker   • Smokeless tobacco: Not on file   • Alcohol use No   • Drug use: No   • Sexual activity: Not on file     Other Topics Concern   • Not on file     Social History Narrative       Objective   Physical Exam   Constitutional: She is oriented to person, place, and time. She appears well-developed and well-nourished.  Non-toxic appearance.   Eyes: EOM are normal. No scleral icterus.   Pulmonary/Chest: Effort normal. No respiratory distress.   Abdominal: Soft. Normal appearance. There is no tenderness.   Neurological: She is alert and oriented to person, place, and time.   Skin: Skin is warm and dry.   Psychiatric: She has a normal mood and affect. Her behavior is normal. Judgment and thought content normal.       Assessment/Plan       The encounter diagnosis was Epigastric pain.    The patient has recurrent episodes of epigastric  abdominal pain that is atypical for gallbladder disease.  Her right upper quadrant ultrasound is normal.  She has been scheduled for an EGD.  The patient understands the indications, alternatives, risks, and benefits of the procedure and wishes to proceed.  If the EGD is normal she may need a HIDA scan.

## 2017-10-17 DIAGNOSIS — R10.13 EPIGASTRIC ABDOMINAL PAIN: Primary | ICD-10-CM

## 2017-10-24 ENCOUNTER — HOSPITAL ENCOUNTER (OUTPATIENT)
Dept: NUCLEAR MEDICINE | Facility: HOSPITAL | Age: 52
Discharge: HOME | End: 2017-10-24
Attending: SURGERY

## 2017-10-24 DIAGNOSIS — R10.13 EPIGASTRIC ABDOMINAL PAIN: ICD-10-CM

## 2017-10-24 PROCEDURE — 25010000002 SINCALIDE PER 5 MCG: Performed by: SURGERY

## 2017-10-24 PROCEDURE — A9537 TC99M MEBROFENIN: HCPCS | Performed by: SURGERY

## 2017-10-24 PROCEDURE — 78227 HEPATOBIL SYST IMAGE W/DRUG: CPT

## 2017-10-24 PROCEDURE — 0 TECHNETIUM TC 99M MEBROFENIN KIT: Performed by: SURGERY

## 2017-10-24 RX ORDER — KIT FOR THE PREPARATION OF TECHNETIUM TC 99M MEBROFENIN 45 MG/10ML
1 INJECTION, POWDER, LYOPHILIZED, FOR SOLUTION INTRAVENOUS
Status: COMPLETED | OUTPATIENT
Start: 2017-10-24 | End: 2017-10-24

## 2017-10-24 RX ADMIN — MEBROFENIN 1 DOSE: 45 INJECTION, POWDER, LYOPHILIZED, FOR SOLUTION INTRAVENOUS at 08:00

## 2017-10-24 RX ADMIN — SINCALIDE: 5 INJECTION, POWDER, LYOPHILIZED, FOR SOLUTION INTRAVENOUS at 09:30

## 2017-11-09 DIAGNOSIS — K82.8 BILIARY DYSKINESIA: Primary | ICD-10-CM

## 2017-11-16 ENCOUNTER — APPOINTMENT (OUTPATIENT)
Dept: PREADMISSION TESTING | Facility: HOSPITAL | Age: 52
End: 2017-11-16

## 2017-11-17 ENCOUNTER — APPOINTMENT (OUTPATIENT)
Dept: PREADMISSION TESTING | Facility: HOSPITAL | Age: 52
End: 2017-11-17

## 2017-11-17 VITALS
HEART RATE: 58 BPM | WEIGHT: 152 LBS | HEIGHT: 63 IN | OXYGEN SATURATION: 100 % | BODY MASS INDEX: 26.93 KG/M2 | RESPIRATION RATE: 16 BRPM | TEMPERATURE: 97.7 F | DIASTOLIC BLOOD PRESSURE: 75 MMHG | SYSTOLIC BLOOD PRESSURE: 106 MMHG

## 2017-11-17 LAB
ANION GAP SERPL CALCULATED.3IONS-SCNC: 12.8 MMOL/L
BUN BLD-MCNC: 15 MG/DL (ref 6–20)
BUN/CREAT SERPL: 20 (ref 7–25)
CALCIUM SPEC-SCNC: 9 MG/DL (ref 8.6–10.5)
CHLORIDE SERPL-SCNC: 105 MMOL/L (ref 98–107)
CO2 SERPL-SCNC: 23.2 MMOL/L (ref 22–29)
CREAT BLD-MCNC: 0.75 MG/DL (ref 0.57–1)
DEPRECATED RDW RBC AUTO: 44.8 FL (ref 37–54)
ERYTHROCYTE [DISTWIDTH] IN BLOOD BY AUTOMATED COUNT: 13.5 % (ref 11.7–13)
GFR SERPL CREATININE-BSD FRML MDRD: 82 ML/MIN/1.73
GLUCOSE BLD-MCNC: 100 MG/DL (ref 65–99)
HCG SERPL QL: NEGATIVE
HCT VFR BLD AUTO: 38.2 % (ref 35.6–45.5)
HGB BLD-MCNC: 11.9 G/DL (ref 11.9–15.5)
MCH RBC QN AUTO: 28.3 PG (ref 26.9–32)
MCHC RBC AUTO-ENTMCNC: 31.2 G/DL (ref 32.4–36.3)
MCV RBC AUTO: 91 FL (ref 80.5–98.2)
PLATELET # BLD AUTO: 284 10*3/MM3 (ref 140–500)
PMV BLD AUTO: 9.9 FL (ref 6–12)
POTASSIUM BLD-SCNC: 4.3 MMOL/L (ref 3.5–5.2)
RBC # BLD AUTO: 4.2 10*6/MM3 (ref 3.9–5.2)
SODIUM BLD-SCNC: 141 MMOL/L (ref 136–145)
WBC NRBC COR # BLD: 6.45 10*3/MM3 (ref 4.5–10.7)

## 2017-11-17 PROCEDURE — 93010 ELECTROCARDIOGRAM REPORT: CPT | Performed by: INTERNAL MEDICINE

## 2017-11-17 RX ORDER — ALPRAZOLAM 1 MG/1
0.25 TABLET ORAL 2 TIMES DAILY PRN
COMMUNITY
End: 2022-06-06

## 2017-11-17 RX ORDER — ACETAMINOPHEN 325 MG/1
650 TABLET ORAL EVERY 6 HOURS PRN
COMMUNITY

## 2017-11-17 RX ORDER — IBUPROFEN 200 MG
400 TABLET ORAL EVERY 6 HOURS PRN
COMMUNITY
End: 2019-06-26

## 2017-11-17 NOTE — DISCHARGE INSTRUCTIONS
Take the following medications the morning of surgery with a small sip of water:  NEXIUM    PLEASE ARRIVE AT HOSPITAL AT 0900 AM ON November 20, 2017    General Instructions:  • Do not eat solid food after midnight the night before surgery.  • You may drink clear liquids day of surgery but must stop at least one hour before your hospital arrival time.  • It is beneficial for you to have a clear drink that contains carbohydrates the day of surgery.  We suggest a 12 to 20 ounce bottle of Gatorade or Powerade for non-diabetic patients or a 12 to 20 ounce bottle of G2 or Powerade Zero for diabetic patients. (Pediatric patients, are not advised to drink a 12 to 20 ounce carbohydrate drink)    Clear liquids are liquids you can see through.  Nothing red in color.     Plain water                               Sports drinks  Sodas                                   Gelatin (Jell-O)  Fruit juices without pulp such as white grape juice and apple juice  Popsicles that contain no fruit or yogurt  Tea or coffee (no cream or milk added)  Gatorade / Powerade  G2 / Powerade Zero    • Infants may have breast milk up to four hours before surgery.  • Infants drinking formula may drink formula up to six hours before surgery.   • Patients who avoid smoking, chewing tobacco and alcohol for 4 weeks prior to surgery have a reduced risk of post-operative complications.  Quit smoking as many days before surgery as you can.  • Do not smoke, use chewing tobacco or drink alcohol the day of surgery.   • If applicable bring your C-PAP/ BI-PAP machine.  • Bring any papers given to you in the doctor’s office.  • Wear clean comfortable clothes and socks.  • Do not wear contact lenses or make-up.  Bring a case for your glasses.   • Bring crutches or walker if applicable.  • Remove all piercings.  Leave jewelry and any other valuables at home.  • The Pre-Admission Testing nurse will instruct you to bring medications if unable to obtain an accurate list  in Pre-Admission Testing.        If you were given a blood bank ID arm band remember to bring it with you the day of surgery.    Preventing a Surgical Site Infection:  • For 2 to 3 days before surgery, avoid shaving with a razor because the razor can irritate skin and make it easier to develop an infection.  • The night prior to surgery sleep in a clean bed with clean clothing.  Do not allow pets to sleep with you.  • Shower on the morning of surgery using a fresh bar of anti-bacterial soap (such as Dial) and clean washcloth.  Dry with a clean towel and dress in clean clothing.  • Ask your surgeon if you will be receiving antibiotics prior to surgery.  • Make sure you, your family, and all healthcare providers clean their hands with soap and water or an alcohol based hand  before caring for you or your wound.    Day of surgery:  Upon arrival, a Pre-op nurse and Anesthesiologist will review your health history, obtain vital signs, and answer questions you may have.  The only belongings needed at this time will be your home medications and if applicable your C-PAP/BI-PAP machine.  If you are staying overnight your family can leave the rest of your belongings in the car and bring them to your room later.  A Pre-op nurse will start an IV and you may receive medication in preparation for surgery, including something to help you relax.  Your family will be able to see you in the Pre-op area.  While you are in surgery your family should notify the waiting room  if they leave the waiting room area and provide a contact phone number.    Please be aware that surgery does come with discomfort.  We want to make every effort to control your discomfort so please discuss any uncontrolled symptoms with your nurse.   Your doctor will most likely have prescribed pain medications.      If you are going home after surgery you will receive individualized written care instructions before being discharged.  A  responsible adult must drive you to and from the hospital on the day of your surgery and stay with you for 24 hours.    If you are staying overnight following surgery, you will be transported to your hospital room following the recovery period.  [facility] has all private rooms.    If you have any questions please call Pre-Admission Testing at [phone].  Deductibles and co-payments are collected on the day of service. Please be prepared to pay the required co-pay, deductible or deposit on the day of service as defined by your plan.

## 2017-11-20 ENCOUNTER — HOSPITAL ENCOUNTER (OUTPATIENT)
Facility: HOSPITAL | Age: 52
Setting detail: HOSPITAL OUTPATIENT SURGERY
Discharge: HOME | End: 2017-11-20
Attending: SURGERY | Admitting: SURGERY

## 2017-11-20 ENCOUNTER — APPOINTMENT (OUTPATIENT)
Dept: GENERAL RADIOLOGY | Facility: HOSPITAL | Age: 52
End: 2017-11-20

## 2017-11-20 ENCOUNTER — ANESTHESIA (OUTPATIENT)
Dept: PERIOP | Facility: HOSPITAL | Age: 52
End: 2017-11-20

## 2017-11-20 ENCOUNTER — ANESTHESIA EVENT (OUTPATIENT)
Dept: PERIOP | Facility: HOSPITAL | Age: 52
End: 2017-11-20

## 2017-11-20 VITALS
HEIGHT: 63 IN | TEMPERATURE: 97.9 F | SYSTOLIC BLOOD PRESSURE: 109 MMHG | DIASTOLIC BLOOD PRESSURE: 72 MMHG | BODY MASS INDEX: 26.22 KG/M2 | WEIGHT: 148 LBS | HEART RATE: 102 BPM | RESPIRATION RATE: 16 BRPM | OXYGEN SATURATION: 98 %

## 2017-11-20 DIAGNOSIS — K82.8 BILIARY DYSKINESIA: ICD-10-CM

## 2017-11-20 PROCEDURE — 25010000002 ONDANSETRON PER 1 MG: Performed by: NURSE ANESTHETIST, CERTIFIED REGISTERED

## 2017-11-20 PROCEDURE — 25010000002 KETOROLAC TROMETHAMINE PER 15 MG: Performed by: NURSE ANESTHETIST, CERTIFIED REGISTERED

## 2017-11-20 PROCEDURE — 88304 TISSUE EXAM BY PATHOLOGIST: CPT | Performed by: SURGERY

## 2017-11-20 PROCEDURE — 47563 LAPARO CHOLECYSTECTOMY/GRAPH: CPT | Performed by: SURGERY

## 2017-11-20 PROCEDURE — 47563 LAPARO CHOLECYSTECTOMY/GRAPH: CPT | Performed by: PHYSICIAN ASSISTANT

## 2017-11-20 PROCEDURE — 0 IOTHALAMATE 60 % SOLUTION: Performed by: SURGERY

## 2017-11-20 PROCEDURE — 25010000002 SUCCINYLCHOLINE PER 20 MG: Performed by: NURSE ANESTHETIST, CERTIFIED REGISTERED

## 2017-11-20 PROCEDURE — 74300 X-RAY BILE DUCTS/PANCREAS: CPT

## 2017-11-20 PROCEDURE — 25010000002 FENTANYL CITRATE (PF) 100 MCG/2ML SOLUTION: Performed by: NURSE ANESTHETIST, CERTIFIED REGISTERED

## 2017-11-20 PROCEDURE — S0260 H&P FOR SURGERY: HCPCS | Performed by: SURGERY

## 2017-11-20 PROCEDURE — 25010000002 PROPOFOL 10 MG/ML EMULSION: Performed by: NURSE ANESTHETIST, CERTIFIED REGISTERED

## 2017-11-20 PROCEDURE — 25010000002 DEXAMETHASONE PER 1 MG: Performed by: NURSE ANESTHETIST, CERTIFIED REGISTERED

## 2017-11-20 PROCEDURE — 25010000002 MIDAZOLAM PER 1 MG: Performed by: ANESTHESIOLOGY

## 2017-11-20 RX ORDER — SUCCINYLCHOLINE CHLORIDE 20 MG/ML
INJECTION INTRAMUSCULAR; INTRAVENOUS AS NEEDED
Status: DISCONTINUED | OUTPATIENT
Start: 2017-11-20 | End: 2017-11-20 | Stop reason: SURG

## 2017-11-20 RX ORDER — OXYCODONE AND ACETAMINOPHEN 7.5; 325 MG/1; MG/1
1 TABLET ORAL ONCE AS NEEDED
Status: DISCONTINUED | OUTPATIENT
Start: 2017-11-20 | End: 2017-11-20 | Stop reason: HOSPADM

## 2017-11-20 RX ORDER — GLYCOPYRROLATE 0.2 MG/ML
INJECTION INTRAMUSCULAR; INTRAVENOUS AS NEEDED
Status: DISCONTINUED | OUTPATIENT
Start: 2017-11-20 | End: 2017-11-20 | Stop reason: SURG

## 2017-11-20 RX ORDER — EPHEDRINE SULFATE 50 MG/ML
5 INJECTION, SOLUTION INTRAVENOUS ONCE AS NEEDED
Status: DISCONTINUED | OUTPATIENT
Start: 2017-11-20 | End: 2017-11-20 | Stop reason: HOSPADM

## 2017-11-20 RX ORDER — KETOROLAC TROMETHAMINE 30 MG/ML
INJECTION, SOLUTION INTRAMUSCULAR; INTRAVENOUS AS NEEDED
Status: DISCONTINUED | OUTPATIENT
Start: 2017-11-20 | End: 2017-11-20 | Stop reason: SURG

## 2017-11-20 RX ORDER — MIDAZOLAM HYDROCHLORIDE 1 MG/ML
1 INJECTION INTRAMUSCULAR; INTRAVENOUS
Status: DISCONTINUED | OUTPATIENT
Start: 2017-11-20 | End: 2017-11-20 | Stop reason: HOSPADM

## 2017-11-20 RX ORDER — FENTANYL CITRATE 50 UG/ML
50 INJECTION, SOLUTION INTRAMUSCULAR; INTRAVENOUS
Status: DISCONTINUED | OUTPATIENT
Start: 2017-11-20 | End: 2017-11-20 | Stop reason: HOSPADM

## 2017-11-20 RX ORDER — ONDANSETRON 2 MG/ML
4 INJECTION INTRAMUSCULAR; INTRAVENOUS ONCE AS NEEDED
Status: COMPLETED | OUTPATIENT
Start: 2017-11-20 | End: 2017-11-20

## 2017-11-20 RX ORDER — LIDOCAINE HYDROCHLORIDE 20 MG/ML
INJECTION, SOLUTION INFILTRATION; PERINEURAL AS NEEDED
Status: DISCONTINUED | OUTPATIENT
Start: 2017-11-20 | End: 2017-11-20 | Stop reason: SURG

## 2017-11-20 RX ORDER — BUPIVACAINE HYDROCHLORIDE AND EPINEPHRINE 5; 5 MG/ML; UG/ML
INJECTION, SOLUTION PERINEURAL AS NEEDED
Status: DISCONTINUED | OUTPATIENT
Start: 2017-11-20 | End: 2017-11-20 | Stop reason: HOSPADM

## 2017-11-20 RX ORDER — PROMETHAZINE HYDROCHLORIDE 25 MG/ML
12.5 INJECTION, SOLUTION INTRAMUSCULAR; INTRAVENOUS ONCE AS NEEDED
Status: DISCONTINUED | OUTPATIENT
Start: 2017-11-20 | End: 2017-11-20 | Stop reason: HOSPADM

## 2017-11-20 RX ORDER — HYDRALAZINE HYDROCHLORIDE 20 MG/ML
5 INJECTION INTRAMUSCULAR; INTRAVENOUS
Status: DISCONTINUED | OUTPATIENT
Start: 2017-11-20 | End: 2017-11-20 | Stop reason: HOSPADM

## 2017-11-20 RX ORDER — PROMETHAZINE HYDROCHLORIDE 25 MG/1
12.5 TABLET ORAL ONCE AS NEEDED
Status: DISCONTINUED | OUTPATIENT
Start: 2017-11-20 | End: 2017-11-20 | Stop reason: HOSPADM

## 2017-11-20 RX ORDER — DEXAMETHASONE SODIUM PHOSPHATE 10 MG/ML
INJECTION INTRAMUSCULAR; INTRAVENOUS AS NEEDED
Status: DISCONTINUED | OUTPATIENT
Start: 2017-11-20 | End: 2017-11-20 | Stop reason: SURG

## 2017-11-20 RX ORDER — ONDANSETRON 4 MG/1
4 TABLET, FILM COATED ORAL EVERY 6 HOURS PRN
Qty: 20 TABLET | Refills: 0 | Status: SHIPPED | OUTPATIENT
Start: 2017-11-20 | End: 2017-11-28

## 2017-11-20 RX ORDER — FLUMAZENIL 0.1 MG/ML
0.2 INJECTION INTRAVENOUS AS NEEDED
Status: DISCONTINUED | OUTPATIENT
Start: 2017-11-20 | End: 2017-11-20 | Stop reason: HOSPADM

## 2017-11-20 RX ORDER — LABETALOL HYDROCHLORIDE 5 MG/ML
5 INJECTION, SOLUTION INTRAVENOUS
Status: DISCONTINUED | OUTPATIENT
Start: 2017-11-20 | End: 2017-11-20 | Stop reason: HOSPADM

## 2017-11-20 RX ORDER — FAMOTIDINE 10 MG/ML
20 INJECTION, SOLUTION INTRAVENOUS ONCE
Status: COMPLETED | OUTPATIENT
Start: 2017-11-20 | End: 2017-11-20

## 2017-11-20 RX ORDER — DIPHENHYDRAMINE HYDROCHLORIDE 50 MG/ML
12.5 INJECTION INTRAMUSCULAR; INTRAVENOUS
Status: DISCONTINUED | OUTPATIENT
Start: 2017-11-20 | End: 2017-11-20 | Stop reason: HOSPADM

## 2017-11-20 RX ORDER — ONDANSETRON 2 MG/ML
INJECTION INTRAMUSCULAR; INTRAVENOUS AS NEEDED
Status: DISCONTINUED | OUTPATIENT
Start: 2017-11-20 | End: 2017-11-20 | Stop reason: SURG

## 2017-11-20 RX ORDER — PROPOFOL 10 MG/ML
VIAL (ML) INTRAVENOUS AS NEEDED
Status: DISCONTINUED | OUTPATIENT
Start: 2017-11-20 | End: 2017-11-20 | Stop reason: SURG

## 2017-11-20 RX ORDER — MIDAZOLAM HYDROCHLORIDE 1 MG/ML
2 INJECTION INTRAMUSCULAR; INTRAVENOUS
Status: DISCONTINUED | OUTPATIENT
Start: 2017-11-20 | End: 2017-11-20 | Stop reason: HOSPADM

## 2017-11-20 RX ORDER — ALBUTEROL SULFATE 2.5 MG/3ML
2.5 SOLUTION RESPIRATORY (INHALATION) ONCE AS NEEDED
Status: DISCONTINUED | OUTPATIENT
Start: 2017-11-20 | End: 2017-11-20 | Stop reason: HOSPADM

## 2017-11-20 RX ORDER — OXYCODONE HYDROCHLORIDE AND ACETAMINOPHEN 5; 325 MG/1; MG/1
TABLET ORAL
Qty: 20 TABLET | Refills: 0 | Status: SHIPPED | OUTPATIENT
Start: 2017-11-20 | End: 2017-11-28

## 2017-11-20 RX ORDER — FENTANYL CITRATE 50 UG/ML
INJECTION, SOLUTION INTRAMUSCULAR; INTRAVENOUS AS NEEDED
Status: DISCONTINUED | OUTPATIENT
Start: 2017-11-20 | End: 2017-11-20 | Stop reason: SURG

## 2017-11-20 RX ORDER — PROMETHAZINE HYDROCHLORIDE 25 MG/1
25 SUPPOSITORY RECTAL ONCE AS NEEDED
Status: DISCONTINUED | OUTPATIENT
Start: 2017-11-20 | End: 2017-11-20 | Stop reason: HOSPADM

## 2017-11-20 RX ORDER — SODIUM CHLORIDE 0.9 % (FLUSH) 0.9 %
1-10 SYRINGE (ML) INJECTION AS NEEDED
Status: DISCONTINUED | OUTPATIENT
Start: 2017-11-20 | End: 2017-11-20 | Stop reason: HOSPADM

## 2017-11-20 RX ORDER — PROMETHAZINE HYDROCHLORIDE 25 MG/1
25 TABLET ORAL ONCE AS NEEDED
Status: DISCONTINUED | OUTPATIENT
Start: 2017-11-20 | End: 2017-11-20 | Stop reason: HOSPADM

## 2017-11-20 RX ORDER — SODIUM CHLORIDE 9 MG/ML
INJECTION, SOLUTION INTRAVENOUS AS NEEDED
Status: DISCONTINUED | OUTPATIENT
Start: 2017-11-20 | End: 2017-11-20 | Stop reason: HOSPADM

## 2017-11-20 RX ORDER — ROCURONIUM BROMIDE 10 MG/ML
INJECTION, SOLUTION INTRAVENOUS AS NEEDED
Status: DISCONTINUED | OUTPATIENT
Start: 2017-11-20 | End: 2017-11-20 | Stop reason: SURG

## 2017-11-20 RX ORDER — NALOXONE HCL 0.4 MG/ML
0.2 VIAL (ML) INJECTION AS NEEDED
Status: DISCONTINUED | OUTPATIENT
Start: 2017-11-20 | End: 2017-11-20 | Stop reason: HOSPADM

## 2017-11-20 RX ORDER — MAGNESIUM HYDROXIDE 1200 MG/15ML
LIQUID ORAL AS NEEDED
Status: DISCONTINUED | OUTPATIENT
Start: 2017-11-20 | End: 2017-11-20 | Stop reason: HOSPADM

## 2017-11-20 RX ORDER — SODIUM CHLORIDE, SODIUM LACTATE, POTASSIUM CHLORIDE, CALCIUM CHLORIDE 600; 310; 30; 20 MG/100ML; MG/100ML; MG/100ML; MG/100ML
9 INJECTION, SOLUTION INTRAVENOUS CONTINUOUS
Status: DISCONTINUED | OUTPATIENT
Start: 2017-11-20 | End: 2017-11-20 | Stop reason: HOSPADM

## 2017-11-20 RX ADMIN — SUCCINYLCHOLINE CHLORIDE 100 MG: 20 INJECTION, SOLUTION INTRAMUSCULAR; INTRAVENOUS; PARENTERAL at 14:25

## 2017-11-20 RX ADMIN — SODIUM CHLORIDE, POTASSIUM CHLORIDE, SODIUM LACTATE AND CALCIUM CHLORIDE: 600; 310; 30; 20 INJECTION, SOLUTION INTRAVENOUS at 14:31

## 2017-11-20 RX ADMIN — GLYCOPYRROLATE 0.2 MG: 0.2 INJECTION INTRAMUSCULAR; INTRAVENOUS at 14:23

## 2017-11-20 RX ADMIN — FENTANYL CITRATE 50 MCG: 50 INJECTION, SOLUTION INTRAMUSCULAR; INTRAVENOUS at 14:34

## 2017-11-20 RX ADMIN — LIDOCAINE HYDROCHLORIDE 100 MG: 20 INJECTION, SOLUTION INFILTRATION; PERINEURAL at 14:25

## 2017-11-20 RX ADMIN — MIDAZOLAM 2 MG: 1 INJECTION INTRAMUSCULAR; INTRAVENOUS at 13:39

## 2017-11-20 RX ADMIN — FAMOTIDINE 20 MG: 10 INJECTION, SOLUTION INTRAVENOUS at 10:24

## 2017-11-20 RX ADMIN — ROCURONIUM BROMIDE 25 MG: 10 INJECTION INTRAVENOUS at 14:33

## 2017-11-20 RX ADMIN — PROPOFOL 180 MG: 10 INJECTION, EMULSION INTRAVENOUS at 14:25

## 2017-11-20 RX ADMIN — FENTANYL CITRATE 50 MCG: 50 INJECTION, SOLUTION INTRAMUSCULAR; INTRAVENOUS at 14:23

## 2017-11-20 RX ADMIN — FENTANYL CITRATE 50 MCG: 50 INJECTION INTRAMUSCULAR; INTRAVENOUS at 15:45

## 2017-11-20 RX ADMIN — ONDANSETRON 4 MG: 2 INJECTION INTRAMUSCULAR; INTRAVENOUS at 15:02

## 2017-11-20 RX ADMIN — ONDANSETRON 4 MG: 2 INJECTION INTRAMUSCULAR; INTRAVENOUS at 17:01

## 2017-11-20 RX ADMIN — MIDAZOLAM 2 MG: 1 INJECTION INTRAMUSCULAR; INTRAVENOUS at 10:24

## 2017-11-20 RX ADMIN — ROCURONIUM BROMIDE 5 MG: 10 INJECTION INTRAVENOUS at 14:25

## 2017-11-20 RX ADMIN — FENTANYL CITRATE 50 MCG: 50 INJECTION INTRAMUSCULAR; INTRAVENOUS at 16:39

## 2017-11-20 RX ADMIN — GLYCOPYRROLATE 0.2 MG: 0.2 INJECTION INTRAMUSCULAR; INTRAVENOUS at 15:08

## 2017-11-20 RX ADMIN — DEXAMETHASONE SODIUM PHOSPHATE 8 MG: 10 INJECTION INTRAMUSCULAR; INTRAVENOUS at 14:45

## 2017-11-20 RX ADMIN — SODIUM CHLORIDE, POTASSIUM CHLORIDE, SODIUM LACTATE AND CALCIUM CHLORIDE 9 ML/HR: 600; 310; 30; 20 INJECTION, SOLUTION INTRAVENOUS at 10:24

## 2017-11-20 RX ADMIN — KETOROLAC TROMETHAMINE 30 MG: 30 INJECTION, SOLUTION INTRAMUSCULAR; INTRAVENOUS at 14:46

## 2017-11-20 RX ADMIN — SUGAMMADEX 150 MG: 100 INJECTION, SOLUTION INTRAVENOUS at 15:07

## 2017-11-20 RX ADMIN — FENTANYL CITRATE 50 MCG: 50 INJECTION INTRAMUSCULAR; INTRAVENOUS at 15:53

## 2017-11-20 NOTE — ANESTHESIA POSTPROCEDURE EVALUATION
"Patient: Kacey Garcia    Procedure Summary     Date Anesthesia Start Anesthesia Stop Room / Location    11/20/17 1417 1522  [facility] /  [facility]       Procedure Diagnosis Surgeon Provider    CHOLECYSTECTOMY LAPAROSCOPIC INTRAOPERATIVE CHOLANGIOGRAM (N/A Abdomen) Biliary dyskinesia  (Biliary dyskinesia [K82.8]) MD Viviana Khan Jr., MD          Anesthesia Type: general  Last vitals  BP   106/76 (11/20/17 1725)   Temp   36.6 °C (97.9 °F) (11/20/17 1700)   Pulse   104 (11/20/17 1725)   Resp   16 (11/20/17 1725)     SpO2   97 % (11/20/17 1725)     Post Anesthesia Care and Evaluation    Patient location during evaluation: PHASE II  Patient participation: complete - patient participated  Level of consciousness: awake  Pain management: adequate  Airway patency: patent  Anesthetic complications: No anesthetic complications    Cardiovascular status: acceptable  Respiratory status: acceptable  Hydration status: acceptable    Comments: /76  Pulse 104  Temp 36.6 °C (97.9 °F) (Oral)   Resp 16  Ht 63\" (160 cm)  Wt 148 lb (67.1 kg)  [facility] 11/02/2017  SpO2 97%  BMI 26.22 kg/m2      "

## 2017-11-20 NOTE — ANESTHESIA PREPROCEDURE EVALUATION
Anesthesia Evaluation     Patient summary reviewed and Nursing notes reviewed   history of anesthetic complications: PONV  NPO Solid Status: > 8 hours  NPO Liquid Status: > 2 hours     Airway   Mallampati: II  Neck ROM: full  no difficulty expected  Dental      Comment: crowns    Pulmonary     breath sounds clear to auscultation  (+) asthma,   Cardiovascular     Rhythm: regular    (+) PVD,       Neuro/Psych  (+) headaches, psychiatric history Anxiety,    GI/Hepatic/Renal/Endo    (+)  GERD,     Musculoskeletal     Abdominal    Substance History      OB/GYN          Other   (+) arthritis   history of cancer                                  Anesthesia Plan    ASA 2     general   (Cant take phenergan)  intravenous induction   Anesthetic plan and risks discussed with patient.

## 2017-11-20 NOTE — H&P
Kacey Garcia is a 51 y.o. female who presents for laparoscopic cholecystectomy with cholangiogram.     History of Present Illness      The patient has had recurrent episodes of epigastric abdominal pain that she describes as pressure in her epigastrium just below the sternum with some radiation toward the right back.  It occurs most often after eating a salad.  There is no associated nausea or vomiting.  The symptoms also occur in the morning on an empty stomach and are improved after eating.  She had 1 episode of severe pain after drinking cold water and presented to the emergency room.  A right upper quadrant ultrasound was performed that was normal.  A HIDA scan was performed that showed a gallbladder ejection fraction of 8%.  She was diagnosed with gastritis and started on Prilosec but had coughing spells that she attributed to the Prilosec and stopped the medicine.  She has never had an EGD.  She was recently on a Katina Velasquez diet and lost over 40 pounds.     Review of Systems   Constitutional: Positive for appetite change. Negative for activity change, fatigue and fever.   HENT: Negative for trouble swallowing and voice change.    Respiratory: Negative for chest tightness and shortness of breath.    Cardiovascular: Negative for chest pain and palpitations.   Gastrointestinal: Positive for abdominal pain. Negative for blood in stool, constipation, diarrhea, nausea and vomiting.   Endocrine: Negative for cold intolerance and heat intolerance.   Genitourinary: Negative for dysuria and flank pain.   Neurological: Negative for dizziness and light-headedness.   Hematological: Negative for adenopathy. Does not bruise/bleed easily.   Psychiatric/Behavioral: Negative for agitation and confusion.       Medical History         Past Medical History:   Diagnosis Date   • Asthma     • Basal cell carcinoma       RIGHT BREAST   • BRCA gene positive       BRCA2   • DDD (degenerative disc disease), cervical     • Sinus  disease     • Varicose vein of leg     • Visit for genetic screening     • Vitamin D deficiency            Surgical History          Past Surgical History:   Procedure Laterality Date   • BACK SURGERY       • BASAL CELL CARCINOMA EXCISION       • BREAST BIOPSY       •  SECTION       •  SECTION       • DILATATION AND CURETTAGE       • DISC REMOVAL       • SINUS SURGERY       • SINUS SURGERY                    Family History   Problem Relation Age of Onset   • Cancer Mother         breast X 2 AGE 56-58   • Hypothyroidism Mother     • Heart defect Mother     • Depression Mother     • Breast cancer Mother     • Heart failure Father     • Asthma Father     • Breast cancer Paternal Grandmother         50S   • Ovarian cancer Paternal Grandmother     • Breast cancer Maternal Aunt 73   • Pancreatic cancer Maternal Uncle     • Prostate cancer Maternal Uncle     • Kidney cancer Maternal Uncle     • Hypothyroidism Other     • COPD Other     • Asthma Other     • Diabetes Other         Social History    Social History            Social History   • Marital status:        Spouse name: N/A   • Number of children: 1   • Years of education: N/A           Occupational History   •              Social History Main Topics   • Smoking status: Never Smoker   • Smokeless tobacco: Not on file   • Alcohol use No   • Drug use: No   • Sexual activity: Not on file           Other Topics Concern   • Not on file      Social History Narrative               Objective       Physical Exam   Constitutional: She is oriented to person, place, and time. She appears well-developed and well-nourished.  Non-toxic appearance.   Eyes: EOM are normal. No scleral icterus.   Pulmonary/Chest: Effort normal. No respiratory distress.   Abdominal: Soft. Normal appearance. There is no tenderness.   Neurological: She is alert and oriented to person, place, and time.   Skin: Skin is warm and dry.   Psychiatric: She has a normal mood  and affect. Her behavior is normal. Judgment and thought content normal.            Assessment/Plan             The encounter diagnosis was Epigastric pain.     The patient has recurrent episodes of epigastric abdominal pain and a HIDA scan that shows biliary dyskinesia.  She has been scheduled for a laparoscopic cholecystectomy with cholangiogram.  The patient understands the indications, alternatives, risks, and benefits of the procedure and wishes to proceed.

## 2017-11-20 NOTE — ANESTHESIA PROCEDURE NOTES
Airway  Urgency: elective    Airway not difficult    General Information and Staff    Patient location during procedure: OR  Anesthesiologist: KLAUS WILBURN  CRNA: JOSE KIDD    Indications and Patient Condition  Indications for airway management: airway protection    Preoxygenated: yes  MILS maintained throughout  Mask difficulty assessment: 1 - vent by mask    Final Airway Details  Final airway type: endotracheal airway      Successful airway: ETT  Cuffed: yes   Successful intubation technique: direct laryngoscopy  Endotracheal tube insertion site: oral  Blade: Miky  Blade size: #3  ETT size: 7.0 mm  Cormack-Lehane Classification: grade I - full view of glottis  Placement verified by: chest auscultation and capnometry   Inital cuff pressure (cm H2O): 22  Cuff volume (mL): 7  Measured from: lips  ETT to lips (cm): 22  Number of attempts at approach: 1

## 2017-11-20 NOTE — DISCHARGE INSTRUCTIONS
You had Zofran last at 5:00 tonight.        Dr. Tesfaye Daigle and Dr. Kaushik Tejeda  [address]  [place] [id number] [phone]    Discharge Instructions for Gall Bladder Surgery    1. Go home, rest and take it easy today; however, you should get up and move about several times today to reduce the risk of developing a clot in your legs.      2. You may experience some dizziness or memory loss from the anesthesia.  This may last for the next 24 hours.  Someone should plan on staying with you for the first 24 hours for your safety.    3. Do not make any important legal decisions or sign any legal papers for the next 24 hours.      4. Eat and drink lightly today.  Start off with liquids, jello, soup, crackers or other bland foods at first. You may advance your diet tomorrow as tolerated as long as you do not experience any nausea or vomiting.     5. You may remove your outer dressings in 2 days.  The white tapes called steri-strips should stay in place.  They will fall off on their own in 1-2 weeks.  Do not worry if they come off sooner.      6. You may notice some bleeding/drainage on your outer dressings. A little bloody drainage is normal. If the bleeding/drainage is such that the bandage cannot absorb it, remove the dressing, apply clean gauze and apply firm pressure for a full 15 minutes.  If the bleeding continues, please call me.    7. You may shower tomorrow.  No tub baths until your incisions are completely healed.         8. You have received a prescription for a narcotic pain medicine, as you will have some pain following surgery.   You will not be totally pain free, but your pain medicine should make the pain tolerable.  Please take your pain medicine as prescribed and always take your pills with food to prevent nausea. If you are having severe pain that cannot be controlled by the pain medicine, please contact me.      9. It is not unusual to experience pain/discomfort in your shoulders  or under your ribs after surgery.  It is from the gas used during the laparoscopic procedure and usually lasts 1-3 days.  The prescription pain medicine is used to treat the surgical pain and does not typically alleviate this “gassy” pain.     10. No driving for 24 hours and for as long as you are taking your prescription pain medicine.      11. You will need to call the office at [phone] to schedule a follow-up appointment in  2 weeks.     12. Remember to contact me for any of the following:    • Fever > 101 degrees  • Severe pain that cannot be controlled by taking your pain pills  • Severe nausea or vomiting   • Significant bleeding of your incisions  • Drainage that has a bad smell or is yellow or green in appearance  • Any other questions or concerns        [facility] DISCHARGE INSTRUCTIONS.    IMPORTANT: The following information will help you return to your best level of health.  GENERAL ANESTHESIA.  You have had general anesthesia. You were given a medicine to help you go to sleep and not feel pain.    Follow these instructions:   Go right home. Rest quietly at home today, then you can be up and about.   Do not drink alcohol, drive or operate machinery for 24 hours.   Do not make any important decisions or sign any legal papers in the next 24 hours.   Have a RESPONSIBLE PERSON stay with you the rest of today and overnight for your protection and safety.   Start your diet with fluids and light foods (jello, soup, juice, toast). Then eat a normal diet if not nauseated.    Call your doctor if you have:   any blue or gray skin color.   repeated vomiting.   trouble breathing.   any new problems or concerns.

## 2017-11-20 NOTE — OP NOTE
Surgeon: Tesfaye Daigle Jr., M.D.    Assistant: Jose Ellis PA-C    Pre-Operative Diagnosis: Biliary dyskinesia [K82.8]    Post-Op Diagnosis Codes:     * Biliary dyskinesia [K82.8] and chronic cholecystitis      Procedure Performed: Laparoscopic cholecystectomy with intraoperative cholangiogram    Indications:     The patient is a very pleasant 52-year-old white female who is had postprandial epigastric abdominal pain.  Right upper quadrant ultrasound was normal.  HIDA scan showed a decreased gallbladder ejection fraction of 8%.  She was diagnosed with biliary dyskinesia.  She presents for laparoscopic cholecystectomy with intraoperative cholangiogram.  The patient understands the indications, alternatives, risks, and benefits of the procedure and wishes to proceed.    Procedure:     The patient was identified and taken to the operating room where she was placed in the supine position on the operating table.  Monitors were placed and she underwent general endotracheal anesthesia and was appropriately monitored throughout the case by the anesthesia personnel.  The abdomen was prepped and draped in the standard surgical fashion.  Each incision was infiltrated with 0.5% Marcaine with epinephrine prior to making the incision.  A supraumbilical incision was made and carried through the skin into the subcutaneous tissue.  The abdominal wall was elevated with skin hooks and a Veress needle was inserted through the incision into the abdomen without difficulty.  The abdomen was then insufflated with low pressures encountered initially.  Once fully insufflated, the Veress needle was removed and a 5 mm port was placed in the same incision, again with traction applied to the abdominal wall using skin hooks.  The laparoscope was inserted and the area of Veress needle and port insertion was inspected, no injury had occurred.  Attention was then turned to the upper abdomen.  A 10 mm subxiphoid port was placed by making skin  incision carried through the skin into the subcutaneous tissue and inserting the port through the incision into the abdomen with direct visualization using the laparoscope.  Two 5 mm ports were placed in the right upper quadrant in the same fashion.  The liver edge was elevated and the gallbladder was visualized.  The gallbladder had a somewhat thickened wall and adhesions to the infundibulum.  The gallbladder was grasped and elevated over the liver.  Adhesions were taken down with blunt dissection and Bovie electrocautery.  The gallbladder was then grasped at the infundibulum and traction was applied to open the triangle of Calot.  The triangle of Calot was carefully and meticulously dissected.  The cystic duct was identified and surrounded.  A clip was placed at the infundibulum cystic duct junction.  A cholangiocatheter was introduced into the abdomen through an Angiocath.  A small incision was created in the cystic duct with scissors and the cholangiocatheter was inserted into the cystic duct.  It was secured with a clip.  A cholangiogram was then performed that confirmed our impression of the anatomy, showed a normal size common bile duct with no filling defects, and rapid drainage of contrast material into the duodenum.  The cholangiocatheter was removed and the cystic duct was divided after placing 2 clips proximally and dividing with the scissors.  The cystic artery was also identified, surrounded, and divided after placing 2 clips proximally and dividing distally using Bovie electrocautery.  The gallbladder was removed from the gallbladder fossa using Bovie electrocautery.  It was then passed out of the abdomen through the epigastric port site in an Endo Catch bag.  The gallbladder fossa was carefully inspected and noted to be hemostatic with no evidence of a bile leak.  The abdomen was deflated and all ports were removed.  The fascia of the subxiphoid port was closed with an 0 vicryl suture in a  figure-of-eight fashion.  All skin incisions were closed with a 4-0 Monocryl in a subcuticular fashion followed by benzoin and Steri-Strips.  The sponge, needle, and instrument counts were correct at the end of the case.  The patient tolerated the procedure well and was transferred to the recovery room in stable condition.    Estimated Blood Loss:  minimal      Specimens:   Order Name Source Comment Collection Info Order Time   TISSUE PATHOLOGY EXAM Gallbladder  Collected By: Tesfaye Daigle Jr., MD 11/20/2017  2:58 PM

## 2017-11-21 LAB
CYTO UR: NORMAL
LAB AP CASE REPORT: NORMAL
Lab: NORMAL
PATH REPORT.FINAL DX SPEC: NORMAL
PATH REPORT.GROSS SPEC: NORMAL

## 2017-11-28 ENCOUNTER — OFFICE VISIT (OUTPATIENT)
Dept: SURGERY | Facility: CLINIC | Age: 52
End: 2017-11-28

## 2017-11-28 VITALS
WEIGHT: 148.7 LBS | BODY MASS INDEX: 26.35 KG/M2 | HEIGHT: 63 IN | SYSTOLIC BLOOD PRESSURE: 96 MMHG | OXYGEN SATURATION: 94 % | DIASTOLIC BLOOD PRESSURE: 60 MMHG | HEART RATE: 78 BPM

## 2017-11-28 DIAGNOSIS — Z48.89 POSTOPERATIVE VISIT: Primary | ICD-10-CM

## 2017-11-28 PROCEDURE — 99024 POSTOP FOLLOW-UP VISIT: CPT | Performed by: SURGERY

## 2017-11-28 NOTE — PROGRESS NOTES
Subjective   Kacey Garcia is a 52 y.o. female who returns to the office after undergoing a laparoscopic cholecystectomy with intraoperative cholangiogram on 11/20/2017.     History of Present Illness     The patient is recovering well with no significant postop symptoms.  She is having no abdominal pain.  She has a good appetite and normal bowel function.  Her energy level is marginal but improving.      Review of Systems   Constitutional: Positive for fatigue. Negative for activity change, appetite change and fever.   Respiratory: Negative for chest tightness and shortness of breath.    Cardiovascular: Negative for chest pain and palpitations.   Gastrointestinal: Negative for abdominal pain, constipation, diarrhea and nausea.   Skin: Negative for rash and wound.   Psychiatric/Behavioral: Negative for agitation and confusion.       Objective   Physical Exam   Constitutional:  Non-toxic appearance. She does not appear ill. No distress.   Pulmonary/Chest: Effort normal. No respiratory distress.   Abdominal: Soft. Normal appearance. There is no tenderness.   Neurological: She is alert.   Skin:   Incision: intact with no evidence of infection.   Psychiatric: She has a normal mood and affect. Her behavior is normal.       Assessment/Plan   The encounter diagnosis was Postoperative visit.    The patient is recovering well from her laparoscopic cholecystectomy with intraoperative cholangiogram.  She will be released to return to work next week.  She will follow-up on an as-needed basis.

## 2017-12-18 ENCOUNTER — HOSPITAL ENCOUNTER (OUTPATIENT)
Dept: ULTRASOUND IMAGING | Facility: HOSPITAL | Age: 52
End: 2017-12-18
Attending: SURGERY

## 2017-12-18 ENCOUNTER — HOSPITAL ENCOUNTER (OUTPATIENT)
Dept: MAMMOGRAPHY | Facility: HOSPITAL | Age: 52
Discharge: HOME | End: 2017-12-18
Attending: SURGERY | Admitting: SURGERY

## 2017-12-18 DIAGNOSIS — R92.8 ABNORMAL ULTRASOUND OF BREAST: ICD-10-CM

## 2017-12-18 PROCEDURE — 77063 BREAST TOMOSYNTHESIS BI: CPT

## 2017-12-18 PROCEDURE — G0202 SCR MAMMO BI INCL CAD: HCPCS

## 2017-12-20 ENCOUNTER — TELEPHONE (OUTPATIENT)
Dept: SURGERY | Facility: CLINIC | Age: 52
End: 2017-12-20

## 2017-12-21 ENCOUNTER — TELEPHONE (OUTPATIENT)
Dept: SURGERY | Facility: CLINIC | Age: 52
End: 2017-12-21

## 2017-12-21 NOTE — TELEPHONE ENCOUNTER
December 21, 2017 bilateral mammary with 3-D [place]: Scattered fibroglandular densities.  BI-RADS 1.    Right breast ultrasound: [facility]: There is been no interval change in size of a lobulated nodule 9:30, 5 cm from the nipple.  There is a postbiopsy marker within it.  It measures 1.6 cm.  BI-RADS 3 recommend 6 month follow-up ultrasound.

## 2017-12-21 NOTE — TELEPHONE ENCOUNTER
Screening Mammo done 12/18/17 [facility]   Patient was to have a right breast US done at the same day  This was not completed.     Right breast US needs rescheduled prior to appt here   Rescheduled Jhonny 12/26/17 1:00 pm     Note-informed that Mammo overlook,   US supervisor discussing w/Mammo supervisor.   I apologized to patient.     Lml

## 2017-12-26 ENCOUNTER — HOSPITAL ENCOUNTER (OUTPATIENT)
Dept: ULTRASOUND IMAGING | Facility: HOSPITAL | Age: 52
Discharge: HOME | End: 2017-12-26
Attending: SURGERY | Admitting: SURGERY

## 2017-12-26 DIAGNOSIS — R92.8 ABNORMAL ULTRASOUND OF BREAST: ICD-10-CM

## 2017-12-26 PROCEDURE — 76642 ULTRASOUND BREAST LIMITED: CPT

## 2018-01-03 ENCOUNTER — OFFICE VISIT (OUTPATIENT)
Dept: SURGERY | Facility: CLINIC | Age: 53
End: 2018-01-03

## 2018-01-03 VITALS
HEIGHT: 62 IN | SYSTOLIC BLOOD PRESSURE: 104 MMHG | DIASTOLIC BLOOD PRESSURE: 74 MMHG | WEIGHT: 153.2 LBS | HEART RATE: 67 BPM | BODY MASS INDEX: 28.19 KG/M2 | OXYGEN SATURATION: 99 %

## 2018-01-03 DIAGNOSIS — Z15.01 BRCA2 GENETIC CARRIER: ICD-10-CM

## 2018-01-03 DIAGNOSIS — N60.19 FIBROCYSTIC BREAST DISEASE (FCBD), UNSPECIFIED LATERALITY: ICD-10-CM

## 2018-01-03 DIAGNOSIS — Z80.3 FH: BREAST CANCER: ICD-10-CM

## 2018-01-03 DIAGNOSIS — Z80.41 FH: OVARIAN CANCER: ICD-10-CM

## 2018-01-03 DIAGNOSIS — Z15.09 BRCA2 GENETIC CARRIER: ICD-10-CM

## 2018-01-03 DIAGNOSIS — R92.8 ABNORMAL ULTRASOUND OF BREAST: Primary | ICD-10-CM

## 2018-01-03 DIAGNOSIS — N60.29 FIBROADENOSIS OF BREAST, UNSPECIFIED LATERALITY: ICD-10-CM

## 2018-01-03 PROCEDURE — 99213 OFFICE O/P EST LOW 20 MIN: CPT | Performed by: SURGERY

## 2018-01-03 NOTE — PROGRESS NOTES
"Chief Complaint: Kacey Garcia is a 52 y.o. female who was seen in consultation at the request of Kayy Stahl MD  for BRCA2 genetic carrier, Abnormal MRI, Breast, Abnormal ultrasound, Breast, FH: Breast cancer, FH: Ovarian cancer, Fibroadenoma of breast, right    History of Present Illness:  Ms Garcia has a significant family history of breast cancer, including her mother diagnosed at age 58 and her paternal grandmother diagnosed at age 45.  She has had 2 breast biopsies in the past. The first was in 2008, LEFT breast,  by Dr Russell at [facility] and benign;  and the most recent biopsy was performed 1/8/2009 at [facility] by Dr Christiano Castrejon, for a complex cystic lesion at the 12:00 RIGHT breast location.  The pathology from this returned as cystic apocrine metaplasia with increased fibrosis.   She had her annual mammogram 2/2/10 at [facility] that showed in the RIGHT breast, just lateral to the marker from the most recent biopsy, a circumscribed nodule 7mm in diameter, new compared to prior- read as BR0.      She has noted no masses in either breast, no skin changes, no nipple changes.  She does note some \"knottiness\" in the RIGHT UOQ that fluctuates in consistency and size.  SHe had additional imaging the day of her intiial visit that showed on the mammogram that the RIGHT mammo lesion compressed out, and on ultraosund, clustered microcysts at the 12:00 location.     Mrs. Garcia underwent a breast MRI on 9-17-10  that showed, in comparison to the 1-18-08 MRI-- reidentification of a 10x8mm oval well circumscribed nodule RIGHT breast 9:00, unchanged or possibly smaller, with benign imaging features likely a LN or FA. Ther eis a second smaller but similarly enhancing nosdule at the 9:00 lcoation posterior and superior to the preexisting nodule, also has benign features. Rec correlation with targeted ultrasound.  LEFT no findings.  She then had an ultraosund today of the RIGHT breast, that showed 2 aeparate solid lesions at the " 9:00 location,  by about 5 mm, that correlate to 2 findings from her MRI from 9-2010. THe first  stable on MRI, and 1.1 cm in size; the second new on the 9-2010 MRI, 9mm in size. Both with brenign imaging features oval circumscribed, likely fibroadenomas, BR3, rec 6 month FU ultrasound. Cash.  She has noted no new masses, skin changes, nipple changes, nipple discharge either breast.    Since our last visit, Mrs. Garcia has done well. She reports no new masses, skin changes, nipple changes, nipple discahrge from either breast. SHe does report some tenderness RIGHT breast laterally when she has stress. Her bilateral mammgoram and RIGHT US at [facility] from 2-21-11 showed 2 stable masses in the RIGHT breast at the 9:00 location, 5 CFN, imaging consistent with benign FA. Rec area reevaluated in 6months to ensure stability.  BR3.   Also rec continued MRI surveillance.    Since our last visit, Kacey had her imaging done, as below.  She had 2 RIGHT breast biopsies as below. Both returned as fibroadenomas. Other than tenderness related to the biopsy sites, she has noted no other changes in her exam- no masses, skin changes, nipple changes, nipple discharge.   She has gotten  since our last visit. SHe has had a 10 # weight gain related to stress.   [id number] Breast Ultrasound [facility] Kacey Garcia  Followup right breast nodule  Impression: Stable 10 mm to 11 mm nodule in the 9 o'clock position of the right breast 5 cm from the nipple. The second larger nodule has increased in size from the 02/21/2011 study and biopsy of this nodule is recommended.    11-10-11- Mrs [facility] MRI showed   Two well circumscribed enhancing masses at the 9-00      location within the right breast, one of which shows interval increase      in size since the preceding MRI dated 09/17/2010. These correspond to      solid masses shown on breast ultrasound. Evaluation with biopsy of the      enlarging mass is recommended. Biopsy of the  immediately adjacent stable      mass may also be prudent since it would obviate the need for additional      imaging followup if the mass proved to be, as expected,      histopathologically benign. There are no findings suspicious for      malignancy within the left breast.    Her US 11-7-11 showed 2 nodules at 9:00. The first is stable on  2-21-11 at 1.1x1.0 cm. The second larger lesion has enlarged to a size of  1.6x1.3 cm up from 1.2 cm in 2-2011. Biopsy of the second lesion due to enlargment recommended.     11-16-11- Her pathology returned as 9:00  FA and #2,  9:00, Located within [facility] and FA.      Since our last visit, Kacey has done well. She has adjusted to being  and feels good. She has gained weight, but she thinks this is related to the stress of 2 jobs. She has noted no change sin her breast exam. No new masses, skin cahgnes, nipple changes, nipple discahrge eithe rbreast. Her most recent imagingi s from today 4-27-12 bilateral DX mammogram BR2 for post biopsy changes with 2 clips RIGHT. BR2.      Since our last visit, Mrs. Garcia has done well. She has noted no new masses, skin changes, nipple changes, nipple discharge either breast.   Her most recent imaging is a bilateral MRI [facility] 11-12-12 BR1 negative.  Her review of systems is unchanged other than  the above since 4-27-12.  I have reviewed the patient's Past Medical History, Family History, Social History, medications and allergies and confirm that the information is up to date and accurate.      Kacey has not seen us since December 2012.  She reports that in late November 2016, that she waxed her nipples 1 evening.  That night she had intercourse, and awoke the next morning with redness and warmth of the right breast and a small amount of drainage that had spotted on her T-shirt.  She has noted no additional nipple drainage.    She was treated with Keflex and her symptoms resolved.    She feels now like when she gets out of the shower the right nipple may  be a little bit darker than the left.      She noted no other masses, skin changes, prior to her most recent imaging.   Her most recent imaging includes a bilateral mammogram with 3-D December 14, 2016 at [facility] that was BI-RADS 2.  December 9, 2016 a right breast ultrasound also at [facility] for right nipple discharge, showed periareolar 1-2 minimally prominent ducts, benign.    She has had 2 right breast biopsies in the past that returned as fibroadenomata and fibrocystic change    She has her uterus and ovaries, is perimenopausal, and takes no hormones.  Her family history includes the following: She has one paternal aunt and 5 maternal aunts.  One maternal aunt had breast cancer at age 73 and her mother had breast cancer age 56.  She has a paternal grandmother with breast and ovarian cancer.  Her breast cancer was diagnosed around age 57.  No other breast or ovarian cancer in her family.        In the interim,  Kacey Garcia  has done well.  She has noted no changes in her breast exam. No new masses, skin changes, nipple changes, nipple discharge either breast.     Upon our advice, Kacey saw genetics and had [employer] genetics testing 3-21-17 of BRCA1-2 analysis with cancer next- showed a BRCA2 mutation [id number].     I arranged for her to have a breast MRI.  [facility] May 18, 2017 bilateral breast MRI. Right breast 9:30 bowtie shaped metallic clip- not the ribbon shaped marker which is located more anterior and inferior and not the third marker that is more central and 12:00. from surrounding enhancement that measures 1.2 cm in greatest dimension. Recommend stereotactic guided biopsy of the clip and subsequent surrounding enhancement. No findings suspicious on the left.     I reviewed the procedure notes from January 2009 in November 2011. In 2009 a barbed or knot shaped marker was left at the 12:00 location and the pathology returned as cystic apically metaplasia with dense  stromal fibrosis and was felt concordant. In November 2011 a right breast biopsy of 2 masses at 9:00 were done and both masses returned as fibroadenoma. The bowtie clip was posterior-superior in the ribbon clip was anterior inferior both at 9:00. I discussed the MRI results with Dr. Castrejon today. He felt that a second look ultrasound may be the next best step since the area of enhancement at 9:00 had increased T2 signal and this is favorable and he is previously sampled the masses. Therefore we arranged for her had a right breast Second Look ultrasound.    This was done yesterday May 24, 2017 and showed macrolobulated hypoechoic mass with an internal metallic clip 9:30 right breast 5 cm from the nipple.  Most consistent with fibroadenoma.  Consistent with a recurrent fibroadenoma previously biopsied.  6 month follow-up is recommended.  BI-RADS 3.    Her most recent mammogram was December 14, 2016 no radiographic evidence of malignancy.  BI-RADS 2.    She has had an intentional 22-1/2 pound weight loss.    In the interim, Kacey has talked with Dr. Hall about reconstruction and they have decided to proceed with expander reconstruction at the time of her surgery.  She has also discussed oophorectomy with Dr. Stahl who has deferred the timing to Kacey's preference.    She denies any changes in her exam.      Interval History:  In the interim,  Kacey Garcia  has done well.  She has noted no changes in her breast exam. No new masses, skin changes, nipple changes, nipple discharge either breast.   She denies headache, bone pain, belly pain, cough, changes in vision or gait.  Her most recent imaging includes the following:  December 21, 2017 bilateral mammary with 3-D [facility]: Scattered fibroglandular densities.  BI-RADS 1.     Right breast ultrasound: [facility]: There is been no interval change in size of a lobulated nodule 9:30, 5 cm from the nipple.  There is a postbiopsy marker  within it.  It measures 1.6 cm.  BI-RADS 3 recommend 6 month follow-up ultrasound.    She underwent 2017 a lap cholecystectomy. Dr. Fritz Daigle.  She tells me that she was having intermittent abdominal pain for which she had no explanation, and is now feeling much better.  She saw her psychiatrist who recommended medication but she elected not to start this.  She tells me from a psychological standpoint she is doing better since the resolution of her pain.      Review of Systems:  Review of Systems   Constitutional: Negative for unexpected weight change (1 lb wt loss).   Cardiovascular: Positive for palpitations.   Psychiatric/Behavioral: Positive for sleep disturbance.   All other systems reviewed and are negative.       Past Medical and Surgical History:  Breast Biopsy History:  Patient has had the following breast biopsies:2 breast biopsies in the past- one RIGHT and one LEFT sided.  Both benign pathology per patient report.   Breast Cancer HIstory:  Patient does not have a past medical history of breast cancer.  Breast Operations, and year:  NONE   Obstetric/Gynecologic History:  Age menstrual periods began:13  Patient is premenopausal, first day of last period: 2017  Number of pregnancies:2  Number of live births: 1  Number of abortions or miscarriages: 1  Age of delivery of first child: 35  Patient breast fed, for the following lenth of time: 3 WEEKS   Length of time taking birth control pills: 1 MONTH   Patient has never taken hormone replacement  The patient still has her uterus and ovaries.      Past Surgical History:   Procedure Laterality Date   • BACK SURGERY     • BASAL CELL CARCINOMA EXCISION     • BREAST BIOPSY     •  SECTION     • CHOLECYSTECTOMY WITH INTRAOPERATIVE CHOLANGIOGRAM N/A 2017    Procedure: CHOLECYSTECTOMY LAPAROSCOPIC INTRAOPERATIVE CHOLANGIOGRAM;  Surgeon: Tesfaye Daigle Jr., MD;  Location: [facility];  Service:    • DILATATION AND CURETTAGE     • DISC  REMOVAL     • SINUS SURGERY       Past Medical History:   Diagnosis Date   • Anemia    • Anxiety    • Asthma    • Basal cell carcinoma     RIGHT BREAST, FACE   • BRCA gene positive     BRCA2   • DDD (degenerative disc disease), cervical    • Fibrocystic breast    • GERD (gastroesophageal reflux disease)    • Irregular heart beat     TACHYCARDIA, SAW DR. KELLEY IN 2015.  NO FOLLOW UP REQUIRED   • Migraines    • Nonfunctioning gallbladder    • PONV (postoperative nausea and vomiting)    • Sinus disease    • Spinal headache    • Thyroid nodule    • TMJ (dislocation of temporomandibular joint)    • Varicose vein of leg    • Vitamin D deficiency        Prior Hospitalizations, other than for surgery or childbirth, and year:  NONE     Social History     Social History   • Marital status:      Spouse name: N/A   • Number of children: 1   • Years of education: N/A     Occupational History   •       Social History Main Topics   • Smoking status: Never Smoker   • Smokeless tobacco: Never Used   • Alcohol use No   • Drug use: No   • Sexual activity: Not on file     Other Topics Concern   • Not on file     Social History Narrative     Patient is .  Patient is employed full time with the following occupation:    Patient drinks 1 servings of caffeine per day.    Family History:  Family History   Problem Relation Age of Onset   • Cancer Mother      breast X 2 AGE 56-58   • Hypothyroidism Mother    • Heart defect Mother    • Depression Mother    • Breast cancer Mother    • Heart failure Father    • Asthma Father    • Breast cancer Paternal Grandmother      50S   • Ovarian cancer Paternal Grandmother    • Breast cancer Maternal Aunt 73   • Pancreatic cancer Maternal Uncle    • Prostate cancer Maternal Uncle    • Kidney cancer Maternal Uncle    • Hypothyroidism Other    • COPD Other    • Asthma Other    • Diabetes Other    • Malig Hyperthermia Neg Hx        Vital Signs:  /74  Pulse 67   "Ht 157.5 cm (62\")  Wt 69.5 kg (153 lb 3.2 oz)  SpO2 99%  BMI 28.02 kg/m2     Medications:    Current Outpatient Prescriptions:   •  acetaminophen (TYLENOL) 325 MG tablet, Take 650 mg by mouth Every 6 (Six) Hours As Needed for Mild Pain ., Disp: , Rfl:   •  ALPRAZolam (XANAX) 1 MG tablet, Take 0.25 mg by mouth 2 (Two) Times a Day As Needed for Anxiety or Sleep., Disp: , Rfl:   •  esomeprazole (nexIUM) 20 MG capsule, Take 20 mg by mouth Every Morning Before Breakfast., Disp: , Rfl:   •  ibuprofen (ADVIL,MOTRIN) 200 MG tablet, Take 400 mg by mouth Every 6 (Six) Hours As Needed for Mild Pain . PT IS HOLDING FOR SURGERY, Disp: , Rfl:      Allergies:  Allergies   Allergen Reactions   • Codeine Other (See Comments)     Causes severe stomach pains   • Levaquin [Levofloxacin In D5w] Other (See Comments)     Pins and needles, weakness, nausea, affects bloodwork results   • Tequin [Gatifloxacin] Other (See Comments)     Pins and needles, weakness, nausea, affects bloodwork   • Bupropion Other (See Comments)     INCREASED BLOOD PRESSURE   • Citalopram Mental Status Change     TACHYCARDIA   • Hydrocodone Nausea And Vomiting   • Levofloxacin    • Penicillins Rash       Physical Examination:  /74  Pulse 67  Ht 157.5 cm (62\")  Wt 69.5 kg (153 lb 3.2 oz)  SpO2 99%  BMI 28.02 kg/m2  General Appearance:  Patient is in no distress.  She is well kept and has an  overweight  build.   Psychiatric:  Patient with appropriate mood and affect. Alert and oriented to self, time, and place.    Breast, RIGHT: large  sized, symmetric with the contralateral side.  Breast skin is without erythema, edema, rashes.  There is prominent hair centrally on each breast that is symmetric.  There are no visible abnormalities upon inspection during the arm-raising maneuver or with hands on hips in the sitting position. There is no nipple retraction, discharge or nipple/areolar skin changes.    There are no masses palpable in the sitting or supine " positions. There is a well healed curvilinear incision in the LIQ from a past excision of a basal cell carcinoma.     Breast, LEFT:  large  sized, symmetric with the contralateral side.  Breast skin is without erythema, edema, rashes. There is prominent hair centrally on each breast that is symmetric.  There are no visible abnormalities upon inspection during the arm-raising maneuver or with hands on hips in the sitting position. There is no nipple retraction, discharge or nipple/areolar skin changes.There are no masses palpable in the sitting or supine positions.     Lymphatic:  There is no axillary, cervical, infraclavicular, or supraclavicular adenopathy bilaterally.  Eyes:  Pupils are round and reactive to light.  Cardiovascular:  Heart rate and rhythm are regular.  Respiratory:  Lungs are clear bilaterally with no crackles or wheezes in any lung field.  Gastrointestinal:  Abdomen is soft, nondistended, and nontender. There are no scars from previous surgery.   Musculoskeletal:  Good strength in all 4 extremities.  There is good range of motion in both shoulders.   Skin:  No new skin lesions or rashes on the skin excluding the breast (see breast exam above).    Imagin-4-10 additional diagnostic views and a targetted RIGHT US BHE to further evaluate the RIGHT breast nodule seen on mammo- showed the RIGHT lesion compressed  out on diagnostic views, suggesting that there is not a true mass on her screening mammogram.  On targetted US, there were clustered microcysts around the 12:00 location, at the site of her prior biopsy.  No solid lesions or macrocysts. BR2.    Breast MRI on 9-17-10  that showed, in comparison to the 08 MRI-- reidentification of a 10x8mm oval well circumscribed nodule RIGHT breast 9:00, unchanged or possibly smaller, with benign imaging features likely a LN or FA. Ther eis a second smaller but similarly enhancing nosdule at the 9:00 lcoation posterior and superior to the preexisting  nodule, also has benign features. Rec correlation with targeted ultrasound.  LEFT no findings.  Ultrasound 10-11-10 of the RIGHT breast, that showed 2 separate solid lesions at the 9:00 location,  by about 5 mm, that correlate to 2 findings from her MRI from 9-2010. THe first  stable on MRI, and 1.1 cm in size; the second new on the 9-2010 MRI, 9mm in size. Both with brenign imaging features oval circumscribed, likely fibroadenomas, BR3, rec 6 month FU ultrasound. Cash.    Bilateral mammgoram and RIGHT US at [facility] from 2-21-11 showed 2 stable masses in the RIGHT breast at the 9:00 location, 5 CFN, imaging consistent with benign FA. Rec area reevaluated in 6months to ensure stability.  BR3.     11-10-11- Mrs [facility] MRI showed   Two well circumscribed enhancing masses at the 9-00      location within the right breast, one of which shows interval increase      in size since the preceding MRI dated 09/17/2010. These correspond to      solid masses shown on breast ultrasound. Evaluation with biopsy of the      enlarging mass is recommended. Biopsy of the immediately adjacent stable      mass may also be prudent since it would obviate the need for additional      imaging followup if the mass proved to be, as expected,      histopathologically benign. There are no findings suspicious for      malignancy within the left breast.    Her US 11-7-11 showed 2 nodules at 9:00. The first is stable on  2-21-11 at 1.1x1.0 cm. The second larger lesion has enlarged to a size of  1.6x1.3 cm up from 1.2 cm in 2-2011. Biopsy of the second lesion due to enlargment recommended.     04-27-12 Bilateral diagnostic mammogram [facility]  There are two metallic clips seen at the 9 o'clock position within the right breast biopsy-proven fibroadenoma. A metallic clip medial left breast as well as within the right breast at the 12 o'clock position.   Impression: There are no findings suspicious for malignancy in either breast.   BIRADS Category  2: Benign     11-12-12     Bilateral Breast MRI     [facility]    Kacey Willson  47 year-old female with right breast pain.  IMPRESSION:  There are no finding suspicious for malignancy in either breast.  No abnormality ot substantiate and/or explain the patient's right breast discomfort  Birads Category 1:  Negative    07-       [facility]    DIAGNOSTIC BILATERAL MAMMOGRAM                      KACEY WILLSON  HISTORY: Right lateral breast pain.  Moderately dense there is some mild skin retraction in the lateral aspect of the right breast.  IMPRESSION:  The ultrasound shows a hypoechoic nodule which may have represented a previously biopsied nodule and has relatively benign sonographic characteristics however short-term follow-up ultrasound of the right breast in approximately 3 months to 4 months recommended.  BIRADS 3    07-         [facility]             RIGHT BREAST ULTRASOUND                                KACEY WILLSON  9 o’clock to 10 o’clock right breast hypoechoic nodule measuring up to 1.6 cm x 1.1 cm x 5 mm. Patient had a nodule biopsied in this region on 11/14/2011 representing a fibroadenoma.  IMPRESSION:  Probably benign nodule in the 9 o’clock position of the right breast as described. Recommend follow-up right breast ultrasound 3 months to 4 months.      12-                [facility]     US                         RIGHT BREAST ULTRASOUND    KACEY WILLSON  HISTORY: Nonbloody right nipple discharge.  FINDINGS: Periareolar and retroareolar regions. 1 or 2 minimally prominent ducts are seen. No solid or cystic masses.    12-09-16                          DR MARION ZHU                   EXTERNAL RESULT SCAN               ROSA VIDES  Mammogram and breast ultrasound are normal. She was treated for mastitis.        May 18, 2017 -[facility]-bilateral breast MRI. Right breast 9:30 bowtie shaped metallic clip- not the ribbon shaped marker  which is located more anterior and inferior and not the third marker that is more central and 12:00. from surrounding enhancement that measures 1.2 cm in greatest dimension. Recommend stereotactic guided biopsy of the clip and subsequent surrounding enhancement. No findings suspicious on the left.    5-24-17 [facility] RIGHT second look ultrasound- macrolobulated mass at 9:30 consistent with recurrent fibroadenoma (seen on MRI).  BI-RADS 3 recommend 6 month follow-up.    December 21, 2017 bilateral mammary with 3-D [facility]: Scattered fibroglandular densities.  BI-RADS 1.    Right breast ultrasound: [facility]: There is been no interval change in size of a lobulated nodule 9:30, 5 cm from the nipple.  There is a postbiopsy marker within it.  It measures 1.6 cm.  BI-RADS 3 recommend 6 month follow-up ultrasound.        Pathology:  11-16-11- 2X RIGHT breast biopsies for breast masses enlarging -  9:00  FA and #2,  9:00, FCC and FA    Procedures:      Assessment:   Diagnosis Plan   1. Abnormal ultrasound of breast  MRI Breast Bilateral With & Without Contrast    US Breast Right Limited   2. BRCA2 genetic carrier  MRI Breast Bilateral With & Without Contrast    US Breast Right Limited   3. FH: breast cancer  MRI Breast Bilateral With & Without Contrast    US Breast Right Limited   4. FH: ovarian cancer  MRI Breast Bilateral With & Without Contrast    US Breast Right Limited   5. Fibrocystic breast disease (FCBD), unspecified laterality  MRI Breast Bilateral With & Without Contrast    US Breast Right Limited   6. Fibroadenosis of breast, unspecified laterality  MRI Breast Bilateral With & Without Contrast    US Breast Right Limited      1-  3-21-17 of BRCA1-2 analysis with cancer next- showed a BRCA2 mutation [id number].      2-3  RIGHT MRI-area of enhancement at 9:00 had increased T2 signal- second look ultrasound BR3 for recurring FA 5-2017- BR3 on US , due  6-2018      3-4  Mother age 56, 1/5 MA age 73  PGM breast age 57, ovarian age uncertain      58-  1-2009- core biopsy [facility]- barbed or not shaped marker was left at the 12:00 location and the pathology returned as cystic apically metaplasia with dense stromal fibrosis and was felt concordant    11-16-11- 2X RIGHT breast biopsies for breast masses enlarging -  9:00  FA and #2,  9:00, FCC and FA- bowtie clip was posterior-superior in the ribbon clip was anterior inferior both at 9:00.        Plan:  Kacey is doing well today.  Emotionally she is in a better place than at our last visit.  She tells me that she elected not to start any mood stabilizers that her psychiatrist had recommended, rather the elimination of the pain from her ongoing all gallbladder issues had given her peace of mind.  We discussed the timing of her risk reducing mastectomy and she wishes to have something done in the 2018 Under Year.  I Will Arrange for Her Bilateral Breast MRI in May along with Her Right Breast Ultrasound and See Her Back after.  I Asked Her to Let Us Know What Month Might Look Best for Her after Her Imaging in May.  I Asked Her to Continue Her Self Breast Exam and to Call Me Interim with Any Concerns.  She Has already Seen Dr. Hall, As below.      Previously, Kacey and I discussed that the onset of breast cancer and risk of breast cancer is approximately 30% by age 50 whereas the incidence of ovarian cancer in BRCA2 carriers is less than 1% by  age 50.  The risk increased to 28% by age 70 for ovarian cancer.   We discussed that breast cancer onset is earlier than ovarian cancer onset so that we usually recommend mastectomy prior to oopherectomy.          We previously discussed proceeding with a sentinel lymph node biopsy simply because of the imaging abnormality in the right breast, but if this has benign features and has stability on upcoming imaging, we could eliminate this.    At our next visit, we will discuss a bilateral  risk reducing total mastectomy and reconstruction with expanders with Dr. Hall.    Her next screening mammogram would be due 12-19-18. [facility].      Her psychiatrist is Dr. Linda Tesfaye.     Anna Duenas MD        We have spent 20 minutes in visit today, 12 in face to face .      Next Appointment:  Return for Next scheduled follow up, after imaging.       EMR Dragon/transcription disclaimer:    Much of this encounter note is an electronic transcription/translocation of spoken language to printed text.  The electronic translation of spoken language may permit erroneous, or at times, nonsensical words or phrases to be inadvertently transcribed.  Although I have reviewed the note from such areas, some may still exist.

## 2018-01-10 ENCOUNTER — TELEPHONE (OUTPATIENT)
Dept: SURGERY | Facility: CLINIC | Age: 53
End: 2018-01-10

## 2018-01-10 NOTE — TELEPHONE ENCOUNTER
PT NOTIFIED OF APPTS:    [facility] 05/21/18 AT 10:45  DR CASEY 05/24/18 ARRIVE AT 1:15    PT VOICED UNDERSTANDING

## 2018-04-26 ENCOUNTER — TELEPHONE (OUTPATIENT)
Dept: SURGERY | Facility: CLINIC | Age: 53
End: 2018-04-26

## 2018-04-26 NOTE — TELEPHONE ENCOUNTER
Spoke with pt. Pt has a new ins and will call back tomorrow 04/27/18 with that info.     Need ins info to obtain precert for MRI DOS 05/21/2018

## 2018-05-18 ENCOUNTER — TELEPHONE (OUTPATIENT)
Dept: SURGERY | Facility: CLINIC | Age: 53
End: 2018-05-18

## 2018-05-18 NOTE — TELEPHONE ENCOUNTER
Kacey called and rescheduled her U/S and Breast MRI  At [facility] to 6-4-18 @ 2:00    She will see Dr MARIE 6-11-18 arrive 1:15    kdl

## 2018-05-21 ENCOUNTER — APPOINTMENT (OUTPATIENT)
Dept: MRI IMAGING | Facility: HOSPITAL | Age: 53
End: 2018-05-21
Attending: SURGERY

## 2018-05-21 ENCOUNTER — APPOINTMENT (OUTPATIENT)
Dept: ULTRASOUND IMAGING | Facility: HOSPITAL | Age: 53
End: 2018-05-21
Attending: SURGERY

## 2018-06-04 ENCOUNTER — HOSPITAL ENCOUNTER (OUTPATIENT)
Dept: MRI IMAGING | Facility: HOSPITAL | Age: 53
Discharge: HOME | End: 2018-06-04
Attending: SURGERY

## 2018-06-04 ENCOUNTER — HOSPITAL ENCOUNTER (OUTPATIENT)
Dept: ULTRASOUND IMAGING | Facility: HOSPITAL | Age: 53
Discharge: HOME | End: 2018-06-04
Attending: SURGERY | Admitting: SURGERY

## 2018-06-04 DIAGNOSIS — Z15.01 BRCA2 GENETIC CARRIER: ICD-10-CM

## 2018-06-04 DIAGNOSIS — N60.19 FIBROCYSTIC BREAST DISEASE (FCBD), UNSPECIFIED LATERALITY: ICD-10-CM

## 2018-06-04 DIAGNOSIS — R92.8 ABNORMAL ULTRASOUND OF BREAST: ICD-10-CM

## 2018-06-04 DIAGNOSIS — Z80.41 FH: OVARIAN CANCER: ICD-10-CM

## 2018-06-04 DIAGNOSIS — N60.29 FIBROADENOSIS OF BREAST, UNSPECIFIED LATERALITY: ICD-10-CM

## 2018-06-04 DIAGNOSIS — Z15.09 BRCA2 GENETIC CARRIER: ICD-10-CM

## 2018-06-04 DIAGNOSIS — Z80.3 FH: BREAST CANCER: ICD-10-CM

## 2018-06-04 LAB — CREAT BLDA-MCNC: 0.8 MG/DL (ref 0.6–1.3)

## 2018-06-04 PROCEDURE — 76642 ULTRASOUND BREAST LIMITED: CPT

## 2018-06-04 PROCEDURE — 0159T HC MRI BREAST COMPUTER ANALYSIS: CPT

## 2018-06-04 PROCEDURE — 0 GADOBENATE DIMEGLUMINE 529 MG/ML SOLUTION: Performed by: SURGERY

## 2018-06-04 PROCEDURE — 82565 ASSAY OF CREATININE: CPT

## 2018-06-04 PROCEDURE — A9577 INJ MULTIHANCE: HCPCS | Performed by: SURGERY

## 2018-06-04 PROCEDURE — C8908 MRI W/O FOL W/CONT, BREAST,: HCPCS

## 2018-06-04 RX ADMIN — GADOBENATE DIMEGLUMINE 15 ML: 529 INJECTION, SOLUTION INTRAVENOUS at 16:45

## 2018-06-06 ENCOUNTER — TELEPHONE (OUTPATIENT)
Dept: SURGERY | Facility: CLINIC | Age: 53
End: 2018-06-06

## 2018-06-06 NOTE — TELEPHONE ENCOUNTER
[facility] breast MRI June 4, 2019: At the right breast 9:30, 5 cm from the nipple there is a metallic clip within a weakly enhancing mass measuring 1.6 cm consistent with fibroadenoma.  No findings suspicious in either breast BI-RADS 2.  [facility] June 5, 2018 right breast ultrasound: At 9:30, 5 cm from the nipple there is a 1.6 and meter lobulated mass slightly smaller than seen on the prior.  Clip is present.  Stable fibroadenoma.  BI-RADS 2.

## 2018-06-11 ENCOUNTER — OFFICE VISIT (OUTPATIENT)
Dept: SURGERY | Facility: CLINIC | Age: 53
End: 2018-06-11

## 2018-06-11 VITALS
SYSTOLIC BLOOD PRESSURE: 107 MMHG | OXYGEN SATURATION: 99 % | DIASTOLIC BLOOD PRESSURE: 72 MMHG | WEIGHT: 159 LBS | BODY MASS INDEX: 29.26 KG/M2 | HEIGHT: 62 IN | HEART RATE: 71 BPM

## 2018-06-11 DIAGNOSIS — N60.19 FIBROCYSTIC BREAST DISEASE (FCBD), UNSPECIFIED LATERALITY: ICD-10-CM

## 2018-06-11 DIAGNOSIS — Z15.01 BRCA2 GENETIC CARRIER: Primary | ICD-10-CM

## 2018-06-11 DIAGNOSIS — Z15.09 BRCA2 GENETIC CARRIER: Primary | ICD-10-CM

## 2018-06-11 DIAGNOSIS — Z80.41 FH: OVARIAN CANCER: ICD-10-CM

## 2018-06-11 DIAGNOSIS — D24.1 FIBROADENOMA OF BREAST, RIGHT: ICD-10-CM

## 2018-06-11 DIAGNOSIS — Z80.3 FH: BREAST CANCER: ICD-10-CM

## 2018-06-11 PROCEDURE — 99213 OFFICE O/P EST LOW 20 MIN: CPT | Performed by: SURGERY

## 2018-06-11 NOTE — PROGRESS NOTES
"Chief Complaint: Kacey Garcia is a 52 y.o. female who was seen in consultation at the request of No ref. provider found  for BRCA2 genetic carrier, Abnormal MRI, Breast, Abnormal ultrasound, Breast, FH: Breast cancer, FH: Ovarian cancer, Fibroadenoma of breast, right    History of Present Illness:  Ms Garcia has a significant family history of breast cancer, including her mother diagnosed at age 58 and her paternal grandmother diagnosed at age 45.  She has had 2 breast biopsies in the past. The first was in 2008, LEFT breast,  by Dr Russell at [facility] and benign;  and the most recent biopsy was performed 1/8/2009 at [facility] by Dr Christiano Castrejon, for a complex cystic lesion at the 12:00 RIGHT breast location.  The pathology from this returned as cystic apocrine metaplasia with increased fibrosis.   She had her annual mammogram 2/2/10 at [facility] that showed in the RIGHT breast, just lateral to the marker from the most recent biopsy, a circumscribed nodule 7mm in diameter, new compared to prior- read as BR0.      She has noted no masses in either breast, no skin changes, no nipple changes.  She does note some \"knottiness\" in the RIGHT UOQ that fluctuates in consistency and size.  SHe had additional imaging the day of her intiial visit that showed on the mammogram that the RIGHT mammo lesion compressed out, and on ultraosund, clustered microcysts at the 12:00 location.     Mrs. Garcia underwent a breast MRI on 9-17-10  that showed, in comparison to the 1-18-08 MRI-- reidentification of a 10x8mm oval well circumscribed nodule RIGHT breast 9:00, unchanged or possibly smaller, with benign imaging features likely a LN or FA. Ther eis a second smaller but similarly enhancing nosdule at the 9:00 lcoation posterior and superior to the preexisting nodule, also has benign features. Rec correlation with targeted ultrasound.  LEFT no findings.  She then had an ultraosund today of the RIGHT breast, that showed 2 aeparate solid lesions " at the 9:00 location,  by about 5 mm, that correlate to 2 findings from her MRI from 9-2010. THe first  stable on MRI, and 1.1 cm in size; the second new on the 9-2010 MRI, 9mm in size. Both with brenign imaging features oval circumscribed, likely fibroadenomas, BR3, rec 6 month FU ultrasound. Cash.  She has noted no new masses, skin changes, nipple changes, nipple discharge either breast.    Since our last visit, Mrs. Garcia has done well. She reports no new masses, skin changes, nipple changes, nipple discahrge from either breast. SHe does report some tenderness RIGHT breast laterally when she has stress. Her bilateral mammgoram and RIGHT US at [facility] from 2-21-11 showed 2 stable masses in the RIGHT breast at the 9:00 location, 5 CFN, imaging consistent with benign FA. Rec area reevaluated in 6months to ensure stability.  BR3.   Also rec continued MRI surveillance.    Since our last visit, Kacey had her imaging done, as below.  She had 2 RIGHT breast biopsies as below. Both returned as fibroadenomas. Other than tenderness related to the biopsy sites, she has noted no other changes in her exam- no masses, skin changes, nipple changes, nipple discharge.   She has gotten  since our last visit. SHe has had a 10 # weight gain related to stress.   [id number] Breast Ultrasound [facility] Kacey Garcia  Followup right breast nodule  Impression: Stable 10 mm to 11 mm nodule in the 9 o'clock position of the right breast 5 cm from the nipple. The second larger nodule has increased in size from the 02/21/2011 study and biopsy of this nodule is recommended.    11-10-11- Mrs [facility] MRI showed   Two well circumscribed enhancing masses at the 9-00      location within the right breast, one of which shows interval increase      in size since the preceding MRI dated 09/17/2010. These correspond to      solid masses shown on breast ultrasound. Evaluation with biopsy of the      enlarging mass is recommended. Biopsy  of the immediately adjacent stable      mass may also be prudent since it would obviate the need for additional      imaging followup if the mass proved to be, as expected,      histopathologically benign. There are no findings suspicious for      malignancy within the left breast.    Her US 11-7-11 showed 2 nodules at 9:00. The first is stable on  2-21-11 at 1.1x1.0 cm. The second larger lesion has enlarged to a size of  1.6x1.3 cm up from 1.2 cm in 2-2011. Biopsy of the second lesion due to enlargment recommended.     11-16-11- Her pathology returned as 9:00  FA and #2,  9:00, [facility] and FA.      Since our last visit, Kacey has done well. She has adjusted to being  and feels good. She has gained weight, but she thinks this is related to the stress of 2 jobs. She has noted no change sin her breast exam. No new masses, skin cahgnes, nipple changes, nipple discahrge eithe rbreast. Her most recent imagingi s from today 4-27-12 bilateral DX mammogram BR2 for post biopsy changes with 2 clips RIGHT. BR2.      Since our last visit, Mrs. Garcia has done well. She has noted no new masses, skin changes, nipple changes, nipple discharge either breast.   Her most recent imaging is a bilateral MRI [facility] 11-12-12 BR1 negative.  Her review of systems is unchanged other than  the above since 4-27-12.  I have reviewed the patient's Past Medical History, Family History, Social History, medications and allergies and confirm that the information is up to date and accurate.      Kacey has not seen us since December 2012.  She reports that in late November 2016, that she waxed her nipples 1 evening.  That night she had intercourse, and awoke the next morning with redness and warmth of the right breast and a small amount of drainage that had spotted on her T-shirt.  She has noted no additional nipple drainage.    She was treated with Keflex and her symptoms resolved.    She feels now like when she gets out of the shower the right  nipple may be a little bit darker than the left.      She noted no other masses, skin changes, prior to her most recent imaging.   Her most recent imaging includes a bilateral mammogram with 3-D December 14, 2016 at [facility] that was BI-RADS 2.  December 9, 2016 a right breast ultrasound also at [facility] for right nipple discharge, showed periareolar 1-2 minimally prominent ducts, benign.    She has had 2 right breast biopsies in the past that returned as fibroadenomata and fibrocystic change    She has her uterus and ovaries, is perimenopausal, and takes no hormones.  Her family history includes the following: She has one paternal aunt and 5 maternal aunts.  One maternal aunt had breast cancer at age 73 and her mother had breast cancer age 56.  She has a paternal grandmother with breast and ovarian cancer.  Her breast cancer was diagnosed around age 57.  No other breast or ovarian cancer in her family.        In the interim,  Kacey Garcia  has done well.  She has noted no changes in her breast exam. No new masses, skin changes, nipple changes, nipple discharge either breast.     Upon our advice, Kacey saw genetics and had [employer] genetics testing 3-21-17 of BRCA1-2 analysis with cancer next- showed a BRCA2 mutation [id number].     I arranged for her to have a breast MRI.  [facility] May 18, 2017 bilateral breast MRI. Right breast 9:30 bowtie shaped metallic clip- not the ribbon shaped marker which is located more anterior and inferior and not the third marker that is more central and 12:00. from surrounding enhancement that measures 1.2 cm in greatest dimension. Recommend stereotactic guided biopsy of the clip and subsequent surrounding enhancement. No findings suspicious on the left.     I reviewed the procedure notes from January 2009 in November 2011. In 2009 a barbed or knot shaped marker was left at the 12:00 location and the pathology returned as cystic apically metaplasia  with dense stromal fibrosis and was felt concordant. In November 2011 a right breast biopsy of 2 masses at 9:00 were done and both masses returned as fibroadenoma. The bowtie clip was posterior-superior in the ribbon clip was anterior inferior both at 9:00. I discussed the MRI results with Dr. Castrejon today. He felt that a second look ultrasound may be the next best step since the area of enhancement at 9:00 had increased T2 signal and this is favorable and he is previously sampled the masses. Therefore we arranged for her had a right breast Second Look ultrasound.    This was done yesterday May 24, 2017 and showed macrolobulated hypoechoic mass with an internal metallic clip 9:30 right breast 5 cm from the nipple.  Most consistent with fibroadenoma.  Consistent with a recurrent fibroadenoma previously biopsied.  6 month follow-up is recommended.  BI-RADS 3.    Her most recent mammogram was December 14, 2016 no radiographic evidence of malignancy.  BI-RADS 2.    She has had an intentional 22-1/2 pound weight loss.    In the interim, Kacey has talked with Dr. Hall about reconstruction and they have decided to proceed with expander reconstruction at the time of her surgery.  She has also discussed oophorectomy with Dr. Stahl who has deferred the timing to Kacey's preference.    She denies any changes in her exam.      Interval History:  In the interim,  Kacey Garcia  has done well.  She has noted no changes in her breast exam. No new masses, skin changes, nipple changes, nipple discharge either breast.   She denies headache, bone pain, belly pain, cough, changes in vision or gait.  Her most recent imaging includes the following:  December 21, 2017 bilateral mammary with 3-D [facility]: Scattered fibroglandular densities.  BI-RADS 1.     Right breast ultrasound: [facility]: There is been no interval change in size of a lobulated nodule 9:30, 5 cm from the nipple.  There is a  postbiopsy marker within it.  It measures 1.6 cm.  BI-RADS 3 recommend 6 month follow-up ultrasound.    She underwent November 20, 2017 a lap cholecystectomy. Dr. Fritz Daigle.  She tells me that she was having intermittent abdominal pain for which she had no explanation, and is now feeling much better.  She saw her psychiatrist who recommended medication but she elected not to start this.  She tells me from a psychological standpoint she is doing better since the resolution of her pain.    Interval History:    In the interim,  Kacey Garcia  has done well.  She has noted no changes in her breast exam. No new masses, skin changes, nipple changes, nipple discharge either breast.   She denies headache, bone pain, belly pain, cough, changes in vision or gait.    Her most recent imaging includes the following:  [facility] breast MRI June 4, 2019: At the right breast 9:30, 5 cm from the nipple there is a metallic clip within a weakly enhancing mass measuring 1.6 cm consistent with fibroadenoma.  No findings suspicious in either breast BI-RADS 2.  [facility] June 5, 2018 right breast ultrasound: At 9:30, 5 cm from the nipple there is a 1.6 and meter lobulated mass slightly smaller than seen on the prior.  Clip is present.  Stable fibroadenoma.  BI-RADS 2.    Her weight is stable.  SHe has not seen her gynecologist for interval surveillance.    Review of Systems:  Review of Systems   Constitutional: Positive for unexpected weight change (4 LB WT GAIN).   Cardiovascular: Positive for palpitations.   Psychiatric/Behavioral: Positive for sleep disturbance.   All other systems reviewed and are negative.       Past Medical and Surgical History:  Breast Biopsy History:  Patient has had the following breast biopsies:2 breast biopsies in the past- one RIGHT and one LEFT sided.  Both benign pathology per patient report.   Breast Cancer HIstory:  Patient does not have a past medical history of breast cancer.  Breast  Operations, and year:  NONE   Obstetric/Gynecologic History:  Age menstrual periods began:13  Patient is premenopausal, first day of last period: 2017  Number of pregnancies:2  Number of live births: 1  Number of abortions or miscarriages: 1  Age of delivery of first child: 35  Patient breast fed, for the following lenth of time: 3 WEEKS   Length of time taking birth control pills: 1 MONTH   Patient has never taken hormone replacement  The patient still has her uterus and ovaries.      Past Surgical History:   Procedure Laterality Date   • BACK SURGERY     • BASAL CELL CARCINOMA EXCISION     • BREAST BIOPSY     •  SECTION     • CHOLECYSTECTOMY WITH INTRAOPERATIVE CHOLANGIOGRAM N/A 2017    Procedure: CHOLECYSTECTOMY LAPAROSCOPIC INTRAOPERATIVE CHOLANGIOGRAM;  Surgeon: Tesfaye Daigle Jr., MD;  Location: [facility];  Service:    • DILATATION AND CURETTAGE     • DISC REMOVAL     • SINUS SURGERY       Past Medical History:   Diagnosis Date   • Anemia    • Anxiety    • Asthma    • Basal cell carcinoma     RIGHT BREAST, FACE   • BRCA gene positive     BRCA2   • DDD (degenerative disc disease), cervical    • Fibrocystic breast    • GERD (gastroesophageal reflux disease)    • Irregular heart beat     TACHYCARDIA, SAW DR. KELLEY IN .  NO FOLLOW UP REQUIRED   • Migraines    • Nonfunctioning gallbladder    • PONV (postoperative nausea and vomiting)    • Sinus disease    • Spinal headache    • Thyroid nodule    • TMJ (dislocation of temporomandibular joint)    • Varicose vein of leg    • Vitamin D deficiency        Prior Hospitalizations, other than for surgery or childbirth, and year:  NONE     Social History     Social History   • Marital status:      Spouse name: N/A   • Number of children: 1   • Years of education: N/A     Occupational History   •       Social History Main Topics   • Smoking status: Never Smoker   • Smokeless tobacco: Never Used   • Alcohol use No   • Drug use:  "No   • Sexual activity: Not on file     Other Topics Concern   • Not on file     Social History Narrative   • No narrative on file     Patient is .  Patient is employed full time with the following occupation:    Patient drinks 1 servings of caffeine per day.    Family History:  Family History   Problem Relation Age of Onset   • Cancer Mother         breast X 2 AGE 56-58   • Hypothyroidism Mother    • Heart defect Mother    • Depression Mother    • Breast cancer Mother    • Heart failure Father    • Asthma Father    • Breast cancer Paternal Grandmother         50S   • Ovarian cancer Paternal Grandmother    • Breast cancer Maternal Aunt 73   • Pancreatic cancer Maternal Uncle    • Prostate cancer Maternal Uncle    • Kidney cancer Maternal Uncle    • Hypothyroidism Other    • COPD Other    • Asthma Other    • Diabetes Other    • Malig Hyperthermia Neg Hx        Vital Signs:  /72   Pulse 71   Ht 157.5 cm (62\")   Wt 72.1 kg (159 lb)   SpO2 99%   BMI 29.08 kg/m²      Medications:    Current Outpatient Prescriptions:   •  acetaminophen (TYLENOL) 325 MG tablet, Take 650 mg by mouth Every 6 (Six) Hours As Needed for Mild Pain ., Disp: , Rfl:   •  ALPRAZolam (XANAX) 1 MG tablet, Take 0.25 mg by mouth 2 (Two) Times a Day As Needed for Anxiety or Sleep., Disp: , Rfl:   •  ibuprofen (ADVIL,MOTRIN) 200 MG tablet, Take 400 mg by mouth Every 6 (Six) Hours As Needed for Mild Pain . PT IS HOLDING FOR SURGERY, Disp: , Rfl:      Allergies:  Allergies   Allergen Reactions   • Codeine Other (See Comments)     Causes severe stomach pains   • Levaquin [Levofloxacin In D5w] Other (See Comments)     Pins and needles, weakness, nausea, affects bloodwork results   • Tequin [Gatifloxacin] Other (See Comments)     Pins and needles, weakness, nausea, affects bloodwork   • Bupropion Other (See Comments)     INCREASED BLOOD PRESSURE   • Citalopram Mental Status Change     TACHYCARDIA   • Hydrocodone Nausea And " "Vomiting   • Penicillins Rash       Physical Examination:  /72   Pulse 71   Ht 157.5 cm (62\")   Wt 72.1 kg (159 lb)   SpO2 99%   BMI 29.08 kg/m²   General Appearance:  Patient is in no distress.  She is well kept and has an  overweight  build.   Psychiatric:  Patient with appropriate mood and affect. Alert and oriented to self, time, and place.    Breast, RIGHT: large  sized, symmetric with the contralateral side.  Breast skin is without erythema, edema, rashes.  There is prominent hair centrally on each breast that is symmetric.  There are no visible abnormalities upon inspection during the arm-raising maneuver or with hands on hips in the sitting position. There is no nipple retraction, discharge or nipple/areolar skin changes.    There are no masses palpable in the sitting or supine positions. There is a well healed curvilinear incision in the LIQ from a past excision of a basal cell carcinoma.     Breast, LEFT:  large  sized, symmetric with the contralateral side.  Breast skin is without erythema, edema, rashes. There is prominent hair centrally on each breast that is symmetric.  There are no visible abnormalities upon inspection during the arm-raising maneuver or with hands on hips in the sitting position. There is no nipple retraction, discharge or nipple/areolar skin changes.There are no masses palpable in the sitting or supine positions.     Lymphatic:  There is no axillary, cervical, infraclavicular, or supraclavicular adenopathy bilaterally.  Eyes:  Pupils are round and reactive to light.  Cardiovascular:  Heart rate and rhythm are regular.  Respiratory:  Lungs are clear bilaterally with no crackles or wheezes in any lung field.  Gastrointestinal:  Abdomen is soft, nondistended, and nontender. There are no scars from previous surgery.   Musculoskeletal:  Good strength in all 4 extremities.  There is good range of motion in both shoulders.   Skin:  No new skin lesions or rashes on the skin " excluding the breast (see breast exam above).    Imagin-4-10 additional diagnostic views and a targetted RIGHT US [facility] to further evaluate the RIGHT breast nodule seen on mammo- showed the RIGHT lesion compressed  out on diagnostic views, suggesting that there is not a true mass on her screening mammogram.  On targetted US, there were clustered microcysts around the 12:00 location, at the site of her prior biopsy.  No solid lesions or macrocysts. BR2.    Breast MRI on 9-17-10  that showed, in comparison to the 08 MRI-- reidentification of a 10x8mm oval well circumscribed nodule RIGHT breast 9:00, unchanged or possibly smaller, with benign imaging features likely a LN or FA. Ther eis a second smaller but similarly enhancing nosdule at the 9:00 lcoation posterior and superior to the preexisting nodule, also has benign features. Rec correlation with targeted ultrasound.  LEFT no findings.  Ultrasound 10-11-10 of the RIGHT breast, that showed 2 separate solid lesions at the 9:00 location,  by about 5 mm, that correlate to 2 findings from her MRI from . THe first  stable on MRI, and 1.1 cm in size; the second new on the  MRI, 9mm in size. Both with brenign imaging features oval circumscribed, likely fibroadenomas, BR3, rec 6 month FU ultrasound. Cash.    Bilateral mammgoram and RIGHT US at [facility] from 11 showed 2 stable masses in the RIGHT breast at the 9:00 location, 5 CFN, imaging consistent with benign FA. Rec area reevaluated in 6months to ensure stability.  BR3.     11-10-11- Mrs [facility] MRI showed   Two well circumscribed enhancing masses at the 9-00      location within the right breast, one of which shows interval increase      in size since the preceding MRI dated 2010. These correspond to      solid masses shown on breast ultrasound. Evaluation with biopsy of the      enlarging mass is recommended. Biopsy of the immediately adjacent stable      mass may also be prudent  since it would obviate the need for additional      imaging followup if the mass proved to be, as expected,      histopathologically benign. There are no findings suspicious for      malignancy within the left breast.    Her US 11-7-11 showed 2 nodules at 9:00. The first is stable on  2-21-11 at 1.1x1.0 cm. The second larger lesion has enlarged to a size of  1.6x1.3 cm up from 1.2 cm in 2-2011. Biopsy of the second lesion due to enlargment recommended.     04-27-12 Bilateral diagnostic mammogram [facility]  There are two metallic clips seen at the 9 o'clock position within the right breast biopsy-proven fibroadenoma. A metallic clip medial left breast as well as within the right breast at the 12 o'clock position.   Impression: There are no findings suspicious for malignancy in either breast.   BIRADS Category 2: Benign     11-12-12     Bilateral Breast MRI     [facility]    Kacey Willson  47 year-old female with right breast pain.  IMPRESSION:  There are no finding suspicious for malignancy in either breast.  No abnormality ot substantiate and/or explain the patient's right breast discomfort  Birads Category 1:  Negative    07-       [facility]    DIAGNOSTIC BILATERAL MAMMOGRAM                      KACEY WILLSON  HISTORY: Right lateral breast pain.  Moderately dense there is some mild skin retraction in the lateral aspect of the right breast.  IMPRESSION:  The ultrasound shows a hypoechoic nodule which may have represented a previously biopsied nodule and has relatively benign sonographic characteristics however short-term follow-up ultrasound of the right breast in approximately 3 months to 4 months recommended.  BIRADS 3    07-         [facility]             RIGHT BREAST ULTRASOUND                                KACEY WILLSON  9 o’clock to 10 o’clock right breast hypoechoic nodule measuring up to 1.6 cm x 1.1 cm x 5 mm. Patient had a nodule biopsied in this region on 11/14/2011  representing a fibroadenoma.  IMPRESSION:  Probably benign nodule in the 9 o’clock position of the right breast as described. Recommend follow-up right breast ultrasound 3 months to 4 months.      12-                [facility]     US                         RIGHT BREAST ULTRASOUND    PEEWEE WILLSON  HISTORY: Nonbloody right nipple discharge.  FINDINGS: Periareolar and retroareolar regions. 1 or 2 minimally prominent ducts are seen. No solid or cystic masses.    12-09-16                          DR MARION ZHU                   EXTERNAL RESULT SCAN               ROSA VIDES  Mammogram and breast ultrasound are normal. She was treated for mastitis.        May 18, 2017 -[facility]-bilateral breast MRI. Right breast 9:30 bowtie shaped metallic clip- not the ribbon shaped marker which is located more anterior and inferior and not the third marker that is more central and 12:00. from surrounding enhancement that measures 1.2 cm in greatest dimension. Recommend stereotactic guided biopsy of the clip and subsequent surrounding enhancement. No findings suspicious on the left.    5-24-17 [facility] RIGHT second look ultrasound- macrolobulated mass at 9:30 consistent with recurrent fibroadenoma (seen on MRI).  BI-RADS 3 recommend 6 month follow-up.    December 21, 2017 bilateral mammary with 3-D [facility]: Scattered fibroglandular densities.  BI-RADS 1.    Right breast ultrasound: [facility]: There is been no interval change in size of a lobulated nodule 9:30, 5 cm from the nipple.  There is a postbiopsy marker within it.  It measures 1.6 cm.  BI-RADS 3 recommend 6 month follow-up ultrasound.    [facility] breast MRI June 4, 2019: At the right breast 9:30, 5 cm from the nipple there is a metallic clip within a weakly enhancing mass measuring 1.6 cm consistent with fibroadenoma.  No findings suspicious in either breast BI-RADS 2.    [facility] June  5, 2018 right breast ultrasound: At 9:30, 5 cm from the nipple there is a 1.6 and meter lobulated mass slightly smaller than seen on the prior.  Clip is present.  Stable fibroadenoma.  BI-RADS 2.      Pathology:  11-16-11- 2X RIGHT breast biopsies for breast masses enlarging -  9:00  FA and #2,  9:00, FCC and FA    Procedures:      Assessment:   Diagnosis Plan   1. BRCA2 genetic carrier  Mammo Screening Digital Tomosynthesis Bilateral With CAD    Ambulatory Referral to Hematology / Oncology   2. FH: breast cancer     3. FH: ovarian cancer     4. Fibrocystic breast disease (FCBD), unspecified laterality     5. Fibroadenoma of breast, right        1-  3-21-17 [facility] BRCA1-2 analysis with cancer next- showed a BRCA2 mutation [id number].      2-3  Mother age 56, 1/5 MA age 73  PGM breast age 57, ovarian age uncertain    5-  RIGHT MRI-area of enhancement at 9:00 had increased T2 signal- second look ultrasound BR3 for recurring FA 5-2017- BR3 on US , due 6-2018- RIGHT US 6-2018- stable 1.6 cm mass BR2 c/w FA    4- 1-2009- core biopsy [facility]- barbed or not shaped marker was left at the 12:00 location and the pathology returned as cystic apically metaplasia with dense stromal fibrosis and was felt concordant    11-16-11- 2X RIGHT breast biopsies for breast masses enlarging -  9:00  FA and #2,  9:00, FCC and FA- bowtie clip was posterior-superior in the ribbon clip was anterior inferior both at 9:00.        Plan:  Kacey is doing well today.  We reviewed her interval history, imaging, imaging reports together today as well as her exam.    She is having second thoughts again about having her breasts removed. We had planned to discuss risk reducing mastectomy and to schedule this for the 2018 calendar year. However, she is having second thoughts.    She has not had FU with her gynecologist bc Dr Stahl moved from [place] to [facility] per the patient report.    Previously, Kacey and I discussed that the onset of breast cancer and  risk of breast cancer is approximately 30% by age 50 whereas the incidence of ovarian cancer in BRCA2 carriers is less than 1% by  age 50.  The risk increased to 28% by age 70 for ovarian cancer.   We discussed that breast cancer onset is earlier than ovarian cancer onset so that we usually recommend mastectomy prior to oopherectomy.  We discussed lifetime breast cancer risk of 70-80% with the BRCA 2 mutation.    We discussed today her reluctance to proceed with a risk reducing mastectomy. We discussed that MRI does not reduce her risk, it just allows us to screen her more carefully.    We discussed oopherectomy or tamoxifen as potential alternatives to surgical risk reduction but with less efficacy.    SHe was interested in talking with medical oncology about  Risk reducing medications so we will arrange this visit.    She has seen Dr Calles already, so if she decides to proceed with surgical risk reduction, we would plan for :  bilateral risk reducing total mastectomy and reconstruction with expanders with Dr. Calles.    Her next screening mammogram would be due 12-19-18. [facility].  Her next MRI will be due 6-5-19 [facility].     Her psychiatrist is Dr. Linda Tesfaye.     I will arrange for the med onc visit, the visit with Dr Stahl for ovarian cancer surveillance, and the mammogram and FU with us.    I asked her to continue her SBE and to call us int he interim with concerns and we'd be happy to see her back sooner.      Anna Duenas MD        We have spent 20 minutes in visit today, 15 in face to face .      Next Appointment:  Return for Next scheduled follow up, after imaging.       EMR Dragon/transcription disclaimer:    Much of this encounter note is an electronic transcription/translocation of spoken language to printed text.  The electronic translation of spoken language may permit erroneous, or at times, nonsensical words or phrases to be inadvertently transcribed.  Although I have reviewed the note  from such areas, some may still exist.

## 2018-06-13 ENCOUNTER — APPOINTMENT (OUTPATIENT)
Dept: ONCOLOGY | Facility: CLINIC | Age: 53
End: 2018-06-13

## 2018-06-13 ENCOUNTER — APPOINTMENT (OUTPATIENT)
Dept: LAB | Facility: HOSPITAL | Age: 53
End: 2018-06-13

## 2018-07-16 ENCOUNTER — CONSULT (OUTPATIENT)
Dept: ONCOLOGY | Facility: CLINIC | Age: 53
End: 2018-07-16

## 2018-07-16 ENCOUNTER — LAB (OUTPATIENT)
Dept: OTHER | Facility: HOSPITAL | Age: 53
End: 2018-07-16

## 2018-07-16 VITALS
TEMPERATURE: 97.5 F | OXYGEN SATURATION: 99 % | WEIGHT: 158.4 LBS | HEART RATE: 63 BPM | DIASTOLIC BLOOD PRESSURE: 70 MMHG | SYSTOLIC BLOOD PRESSURE: 118 MMHG | HEIGHT: 63 IN | RESPIRATION RATE: 16 BRPM | BODY MASS INDEX: 28.07 KG/M2

## 2018-07-16 DIAGNOSIS — C50.919 MALIGNANT NEOPLASM OF FEMALE BREAST, UNSPECIFIED ESTROGEN RECEPTOR STATUS, UNSPECIFIED LATERALITY, UNSPECIFIED SITE OF BREAST (HCC): Primary | ICD-10-CM

## 2018-07-16 DIAGNOSIS — R92.8 ABNORMAL MRI, BREAST: ICD-10-CM

## 2018-07-16 DIAGNOSIS — Z15.01 BRCA2 GENETIC CARRIER: Primary | ICD-10-CM

## 2018-07-16 DIAGNOSIS — Z15.09 BRCA2 GENETIC CARRIER: Primary | ICD-10-CM

## 2018-07-16 DIAGNOSIS — D24.1 FIBROADENOMA OF RIGHT BREAST: ICD-10-CM

## 2018-07-16 LAB
BASOPHILS # BLD AUTO: 0.1 10*3/MM3 (ref 0–0.2)
BASOPHILS NFR BLD AUTO: 1.2 % (ref 0–1.5)
DEPRECATED RDW RBC AUTO: 43.8 FL (ref 37–54)
EOSINOPHIL # BLD AUTO: 0.41 10*3/MM3 (ref 0–0.7)
EOSINOPHIL NFR BLD AUTO: 4.9 % (ref 0.3–6.2)
ERYTHROCYTE [DISTWIDTH] IN BLOOD BY AUTOMATED COUNT: 14.5 % (ref 11.7–13)
HCT VFR BLD AUTO: 38 % (ref 35.6–45.5)
HGB BLD-MCNC: 12.6 G/DL (ref 11.9–15.5)
IMM GRANULOCYTES # BLD: 0.04 10*3/MM3 (ref 0–0.03)
IMM GRANULOCYTES NFR BLD: 0.5 % (ref 0–0.5)
LYMPHOCYTES # BLD AUTO: 2.81 10*3/MM3 (ref 0.9–4.8)
LYMPHOCYTES NFR BLD AUTO: 33.3 % (ref 19.6–45.3)
MCH RBC QN AUTO: 27.6 PG (ref 26.9–32)
MCHC RBC AUTO-ENTMCNC: 33.2 G/DL (ref 32.4–36.3)
MCV RBC AUTO: 83.3 FL (ref 80.5–98.2)
MONOCYTES # BLD AUTO: 0.71 10*3/MM3 (ref 0.2–1.2)
MONOCYTES NFR BLD AUTO: 8.4 % (ref 5–12)
NEUTROPHILS # BLD AUTO: 4.38 10*3/MM3 (ref 1.9–8.1)
NEUTROPHILS NFR BLD AUTO: 51.7 % (ref 42.7–76)
NRBC BLD MANUAL-RTO: 0 /100 WBC (ref 0–0)
PLATELET # BLD AUTO: 302 10*3/MM3 (ref 140–500)
PMV BLD AUTO: 10.1 FL (ref 6–12)
RBC # BLD AUTO: 4.56 10*6/MM3 (ref 3.9–5.2)
WBC NRBC COR # BLD: 8.45 10*3/MM3 (ref 4.5–10.7)

## 2018-07-16 PROCEDURE — 85025 COMPLETE CBC W/AUTO DIFF WBC: CPT | Performed by: INTERNAL MEDICINE

## 2018-07-16 PROCEDURE — 99245 OFF/OP CONSLTJ NEW/EST HI 55: CPT | Performed by: INTERNAL MEDICINE

## 2018-07-16 PROCEDURE — 36415 COLL VENOUS BLD VENIPUNCTURE: CPT

## 2018-07-16 NOTE — PROGRESS NOTES
Subjective     REASON FOR CONSULTATION: BRCA2 mutation with multiple negative breast biopsies  Provide an opinion on any further workup or treatment                             REQUESTING PHYSICIAN:  Anna Duenas M.D.    RECORDS OBTAINED:  Records of the patients history including those obtained from the referring provider were reviewed and summarized in detail.    HISTORY OF PRESENT ILLNESS:  The patient is a 52 y.o. year old female who is here for an opinion about the above issue.    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 with a diagnosis as a BRCA2 carrier status in 2017 .  Genetic testing was done because of a strong family history of breast cancer and a personal history of multiple biopsies in both breasts without atypia and consistent with fibroadenomas in  and prior biopsies in  showing stromal fibrosis she's had MRIs annually the last one being in  of this year.    The patient is considering options for prevention including surgery versus tamoxifen and is here to discuss this.  She has had 3 biopsies on her right breast and 2 on her left breast over the last 15 years.  She is not a smoker or drinker.  She is scared about surgery because she is worried about the risks of infection with implants and on the other hand does not want to take any medications including tamoxifen because of the side effect profile .    She has no significant medical illnesses but is currently premenopausal last period being in  of this year she is  2 para 1 with 1 miscarriage-first childbirth was age 35 she did not breast-feed and is on no hormone therapy currently.  She is  and single    Family history is positive mother with breast cancer at age 60 and CML at age 76 she had chemotherapy with her first diagnosed breast cancer and 1 year of tamoxifen and then will start tamoxifen was not indicated so this was  likely a triple negative breast cancer.Her paternal grandmother had breast cancer at age 45 twice and possibly ovarian cancer- maternal uncle had liver cancer- maternal aunt had breast cancer at age 70 she has some cousins on her maternal side who also had breast cancer  At this point is not clear from which side of her family the [] 2 mutation originates-her half sister was tested and negative for the BRCA2 mutation      Past Medical History:   Diagnosis Date   • Anemia    • Anxiety    • Asthma    • Basal cell carcinoma     RIGHT FACE   • BRCA gene positive     BRCA2   • DDD (degenerative disc disease), cervical    • Fibrocystic breast    • GERD (gastroesophageal reflux disease)    • Hashimoto's thyroiditis    • History of prior pregnancies     x2   • Irregular heart beat     TACHYCARDIA, SAW DR. KELLEY IN .  NO FOLLOW UP REQUIRED   • Malignant neoplasm of female breast (CMS/HCC) 2018   • Migraines    • Nonfunctioning gallbladder    • PONV (postoperative nausea and vomiting)    • Sinus disease    • Spinal headache    • Thyroid nodule    • TMJ (dislocation of temporomandibular joint)    • Varicose vein of leg    • Vitamin D deficiency         Past Surgical History:   Procedure Laterality Date   • BACK SURGERY  10/2013    Lumbar   • BASAL CELL CARCINOMA EXCISION Right     Face   • BREAST BIOPSY Left     Benign   • BREAST BIOPSY Right 2009    Complex cystic lesion   •  SECTION     • CHOLECYSTECTOMY WITH INTRAOPERATIVE CHOLANGIOGRAM N/A 2017    Procedure: CHOLECYSTECTOMY LAPAROSCOPIC INTRAOPERATIVE CHOLANGIOGRAM;  Surgeon: Tesfaye Daigle Jr., MD;  Location: [facility];  Service:    • COLONOSCOPY     • DILATATION AND CURETTAGE     • DISC REMOVAL     • SINUS SURGERY     • WISDOM TOOTH EXTRACTION          Current Outpatient Prescriptions on File Prior to Visit   Medication Sig Dispense Refill   • acetaminophen (TYLENOL) 325 MG tablet Take 650 mg by mouth Every 6 (Six) Hours As Needed  for Mild Pain .     • ALPRAZolam (XANAX) 1 MG tablet Take 0.25 mg by mouth 2 (Two) Times a Day As Needed for Anxiety or Sleep.     • ibuprofen (ADVIL,MOTRIN) 200 MG tablet Take 400 mg by mouth Every 6 (Six) Hours As Needed for Mild Pain . PT IS HOLDING FOR SURGERY       No current facility-administered medications on file prior to visit.         ALLERGIES:    Allergies   Allergen Reactions   • Codeine Other (See Comments)     Causes severe stomach pains   • Levaquin [Levofloxacin In D5w] Other (See Comments)     Pins and needles, weakness, nausea, affects bloodwork results   • Tequin [Gatifloxacin] Other (See Comments)     Pins and needles, weakness, nausea, affects bloodwork   • Bupropion Other (See Comments)     INCREASED BLOOD PRESSURE   • Citalopram Mental Status Change     TACHYCARDIA   • Hydrocodone Nausea And Vomiting   • Penicillins Rash        Social History     Social History   • Marital status:    • Number of children: 1   • Years of education: College     Occupational History   • ; Therapist, psychiatric [employer]     Social History Main Topics   • Smoking status: Never Smoker   • Smokeless tobacco: Never Used   • Alcohol use No   • Drug use: No     Other Topics Concern   • Not on file        Family History   Problem Relation Age of Onset   • Cancer Mother         breast X 2 AGE 56-58   • Hypothyroidism Mother    • Heart defect Mother    • Depression Mother    • Heart failure Father    • Asthma Father    • Breast cancer Paternal Grandmother 45   • Ovarian cancer Paternal Grandmother    • Breast cancer Maternal Aunt 73   • Pancreatic cancer Maternal Uncle    • Prostate cancer Maternal Uncle    • Kidney cancer Maternal Uncle    • Hypothyroidism Other    • COPD Other    • Asthma Other    • Diabetes Other    • Asthma Sister         Rheumatoid   • Diabetes Maternal Grandmother    • Cancer Maternal Aunt         Bladder   • Malig Hyperthermia Neg Hx         Review of Systems  "  Constitutional: Positive for diaphoresis (Hot flashes).   Cardiovascular: Positive for palpitations.   Endocrine: Positive for heat intolerance.        Objective     Vitals:    07/16/18 1447   BP: 118/70   Pulse: 63   Resp: 16   Temp: 97.5 °F (36.4 °C)   TempSrc: Oral   SpO2: 99%   Weight: 71.8 kg (158 lb 6.4 oz)   Height: 159 cm (62.6\")   PainSc: 0-No pain     Current Status 7/16/2018   ECOG score 0       Physical Exam    GENERAL:  Well-developed, well-nourished in no acute distress.   SKIN:  Warm, dry without rashes, purpura or petechiae.  EYES:  Pupils equal, round and reactive to light.  EOMs intact.  Conjunctivae normal.  EARS:  Hearing intact.  NOSE:  Septum midline.  No excoriations or nasal discharge.  MOUTH:  Tongue is well-papillated; no stomatitis or ulcers.  Lips normal.  THROAT:  Oropharynx without lesions or exudates.  NECK:  Supple with good range of motion; no thyromegaly or masses, no JVD.  LYMPHATICS:  No cervical, supraclavicular, axillary or inguinal adenopathy.  CHEST:  Lungs clear to auscultation. Good airflow.  BREASTS: Fibrocystic nodularity the upper outer quadrants of both breasts with no dominant masses  CARDIAC:  Regular rate and rhythm without murmurs, rubs or gallops. Normal S1,S2.  ABDOMEN:  Soft, nontender with no hepatosplenomegaly or masses.  EXTREMITIES:  No clubbing, cyanosis or edema.  NEUROLOGICAL:  Cranial Nerves II-XII grossly intact.  No focal neurological deficits.  PSYCHIATRIC:  Normal affect and mood.        RECENT LABS:  Hematology WBC   Date Value Ref Range Status   07/16/2018 8.45 4.50 - 10.70 10*3/mm3 Final     RBC   Date Value Ref Range Status   07/16/2018 4.56 3.90 - 5.20 10*6/mm3 Final     Hemoglobin   Date Value Ref Range Status   07/16/2018 12.6 11.9 - 15.5 g/dL Final     Hematocrit   Date Value Ref Range Status   07/16/2018 38.0 35.6 - 45.5 % Final     Platelets   Date Value Ref Range Status   07/16/2018 302 140 - 500 10*3/mm3 Final      MRI breast " bilateral  FINDINGS: Within the right breast centered at the 9:30 position on the  order of 5 cm from the nipple there is a metallic clip within a  lobulated weakly enhancing mass that measures on the order of 1.6 cm in  greatest dimension. This represents the biopsy-proven fibroadenoma.  There are no areas of abnormal enhancement or morphology in the right  breast. I see no evidence for abnormal right breast skin, nipple or  chest wall enhancement and there is no evidence for right axillary  adenopathy.     There are no areas of abnormal enhancement or morphology in the left  breast. I see no evidence for abnormal left breast skin, nipple or chest  wall enhancement and there is no evidence for left axillary adenopathy.     IMPRESSION:  There are no findings suspicious for malignancy in either breast.  Routine follow-up imaging is recommended.     BI-RADS CATEGORY 2: Benign.     This report was finalized on 6/5/2018 11/2011  Final Diagnosis  1:  RIGHT BREAST MASS #1, 9:00 POSITION, NEEDLE BIOPSIES:       FRAGMENTS OF FIBROEPITHELIAL LESION, FAVOR FIBROADENOMA.    2:  RIGHT BREAST MASS #2, 9:00 POSITION, NEEDLE BIOPSIES:       FIBROCYSTIC CHANGE.       MICROCYST FORMATION.       APOCRINE METAPLASIA.       FOCAL SCLEROSING ADENOSIS.       FIBROEPITHELIAL CHANGES CONSISTENT WITH FIBROADENOMA.        1/2009  RIGHT BREAST, CORE BIOPSIES:       CORES OF BENIGN BREAST TISSUE DISPLAYING CYSTIC APOCRINE METAPLASIA         IN ASSOCIATION WITH INCREASED DENSE STROMAL FIBROSIS.       NEGATIVE FOR MALIGNANCY.  Assessment/Plan   1, .  BRCA 2 carrier with multiple previous breast biopsies bilaterally  2.  Fibroadenomata in  the right breast  3.  Premenopausal status    Plan  Reason I had a long discussion about risk reduction.  She is already heard and read about this multiple times in the past but has fear of surgery because she is worried about risk of infection with  implants.  She also knows about tamoxifen for  prevention and I told her although there was data in the prevention trials suggesting a benefit in BRCA patient's the numbers were small-and even a 50% reduction in risk would still put her way above the average lifetime risk for the general population-and would likely just delay her surgery..The benefit is likely better in patients with BRCA2 who have more likelihood of ER positive tumors than in the BRCA1 patient's who are  more uniformly triple negative but we still don't have a lot of data about this  .We discussed also the side effects of tamoxifen and the fact that she'll need 5 years of this medication to show benefit and she is really not inclined to take any medications especially if they have  side effects.    We also discussed the fact that our imaging tests including MRI are not 100 percent accurate and may not find small triple negative cancers and she understands this also    She may need to have a second opinion on plastic surgery to get more information about this but at the end of our long talk she seems inclined to go for surgery with bilateral mastectomies and implants although she is wanting to do her oophorectomy first.    I have not made an appointment for follow-up we'll be happy to see her again if needed

## 2018-12-10 ENCOUNTER — TRANSCRIBE ORDERS (OUTPATIENT)
Dept: ADMINISTRATIVE | Facility: HOSPITAL | Age: 53
End: 2018-12-10

## 2018-12-10 ENCOUNTER — HOSPITAL ENCOUNTER (OUTPATIENT)
Dept: CARDIOLOGY | Facility: HOSPITAL | Age: 53
Discharge: HOME | End: 2018-12-10
Attending: INTERNAL MEDICINE | Admitting: INTERNAL MEDICINE

## 2018-12-10 DIAGNOSIS — M79.662 PAIN OF LEFT CALF: ICD-10-CM

## 2018-12-10 DIAGNOSIS — M79.662 PAIN OF LEFT CALF: Primary | ICD-10-CM

## 2018-12-10 LAB
BH CV LOWER VASCULAR LEFT COMMON FEMORAL AUGMENT: NORMAL
BH CV LOWER VASCULAR LEFT COMMON FEMORAL COMPETENT: NORMAL
BH CV LOWER VASCULAR LEFT COMMON FEMORAL COMPRESS: NORMAL
BH CV LOWER VASCULAR LEFT COMMON FEMORAL PHASIC: NORMAL
BH CV LOWER VASCULAR LEFT COMMON FEMORAL SPONT: NORMAL
BH CV LOWER VASCULAR LEFT DISTAL FEMORAL COMPRESS: NORMAL
BH CV LOWER VASCULAR LEFT GASTRONEMIUS COMPRESS: NORMAL
BH CV LOWER VASCULAR LEFT GREATER SAPH AK COMPRESS: NORMAL
BH CV LOWER VASCULAR LEFT GREATER SAPH BK COMPRESS: NORMAL
BH CV LOWER VASCULAR LEFT MID FEMORAL AUGMENT: NORMAL
BH CV LOWER VASCULAR LEFT MID FEMORAL COMPETENT: NORMAL
BH CV LOWER VASCULAR LEFT MID FEMORAL COMPRESS: NORMAL
BH CV LOWER VASCULAR LEFT MID FEMORAL PHASIC: NORMAL
BH CV LOWER VASCULAR LEFT MID FEMORAL SPONT: NORMAL
BH CV LOWER VASCULAR LEFT PERONEAL COMPRESS: NORMAL
BH CV LOWER VASCULAR LEFT POPLITEAL AUGMENT: NORMAL
BH CV LOWER VASCULAR LEFT POPLITEAL COMPETENT: NORMAL
BH CV LOWER VASCULAR LEFT POPLITEAL COMPRESS: NORMAL
BH CV LOWER VASCULAR LEFT POPLITEAL PHASIC: NORMAL
BH CV LOWER VASCULAR LEFT POPLITEAL SPONT: NORMAL
BH CV LOWER VASCULAR LEFT POSTERIOR TIBIAL COMPRESS: NORMAL
BH CV LOWER VASCULAR LEFT PROXIMAL FEMORAL COMPRESS: NORMAL
BH CV LOWER VASCULAR LEFT SAPHENOFEMORAL JUNCTION AUGMENT: NORMAL
BH CV LOWER VASCULAR LEFT SAPHENOFEMORAL JUNCTION COMPETENT: NORMAL
BH CV LOWER VASCULAR LEFT SAPHENOFEMORAL JUNCTION COMPRESS: NORMAL
BH CV LOWER VASCULAR LEFT SAPHENOFEMORAL JUNCTION PHASIC: NORMAL
BH CV LOWER VASCULAR LEFT SAPHENOFEMORAL JUNCTION SPONT: NORMAL
BH CV LOWER VASCULAR RIGHT COMMON FEMORAL AUGMENT: NORMAL
BH CV LOWER VASCULAR RIGHT COMMON FEMORAL COMPETENT: NORMAL
BH CV LOWER VASCULAR RIGHT COMMON FEMORAL COMPRESS: NORMAL
BH CV LOWER VASCULAR RIGHT COMMON FEMORAL PHASIC: NORMAL
BH CV LOWER VASCULAR RIGHT COMMON FEMORAL SPONT: NORMAL

## 2018-12-10 PROCEDURE — 93971 EXTREMITY STUDY: CPT

## 2018-12-19 ENCOUNTER — HOSPITAL ENCOUNTER (OUTPATIENT)
Dept: MAMMOGRAPHY | Facility: HOSPITAL | Age: 53
Discharge: HOME | End: 2018-12-19
Attending: SURGERY | Admitting: SURGERY

## 2018-12-19 DIAGNOSIS — Z15.09 BRCA2 GENETIC CARRIER: ICD-10-CM

## 2018-12-19 DIAGNOSIS — Z15.01 BRCA2 GENETIC CARRIER: ICD-10-CM

## 2018-12-19 PROCEDURE — 77063 BREAST TOMOSYNTHESIS BI: CPT

## 2018-12-19 PROCEDURE — 77067 SCR MAMMO BI INCL CAD: CPT

## 2018-12-20 ENCOUNTER — TELEPHONE (OUTPATIENT)
Dept: SURGERY | Facility: CLINIC | Age: 53
End: 2018-12-20

## 2018-12-21 ENCOUNTER — OFFICE VISIT (OUTPATIENT)
Dept: SURGERY | Facility: CLINIC | Age: 53
End: 2018-12-21

## 2018-12-21 VITALS
SYSTOLIC BLOOD PRESSURE: 122 MMHG | WEIGHT: 161 LBS | DIASTOLIC BLOOD PRESSURE: 72 MMHG | HEART RATE: 76 BPM | BODY MASS INDEX: 29.63 KG/M2 | HEIGHT: 62 IN | OXYGEN SATURATION: 99 %

## 2018-12-21 DIAGNOSIS — Z15.01 BRCA2 GENETIC CARRIER: Primary | ICD-10-CM

## 2018-12-21 DIAGNOSIS — Z15.09 BRCA2 GENETIC CARRIER: Primary | ICD-10-CM

## 2018-12-21 DIAGNOSIS — Z80.3 FH: BREAST CANCER: ICD-10-CM

## 2018-12-21 DIAGNOSIS — Z80.41 FH: OVARIAN CANCER: ICD-10-CM

## 2018-12-21 DIAGNOSIS — D24.1 BREAST FIBROADENOMA, RIGHT: ICD-10-CM

## 2018-12-21 DIAGNOSIS — N60.19 FIBROCYSTIC BREAST DISEASE (FCBD), UNSPECIFIED LATERALITY: ICD-10-CM

## 2018-12-21 PROCEDURE — 99213 OFFICE O/P EST LOW 20 MIN: CPT | Performed by: SURGERY

## 2018-12-21 NOTE — PROGRESS NOTES
"Chief Complaint: Kacey Garcia is a 53 y.o. female who was seen in consultation at the request of Kayy Stahl MD  for BRCA2 genetic carrier, Abnormal MRI, Breast, Abnormal ultrasound, Breast, FH: Breast cancer, FH: Ovarian cancer, Fibroadenoma of breast, right    History of Present Illness:  Ms Garcia has a significant family history of breast cancer, including her mother diagnosed at age 58 and her paternal grandmother diagnosed at age 45.  She has had 2 breast biopsies in the past. The first was in 2008, LEFT breast,  by Dr Russell at [facility] and benign;  and the most recent biopsy was performed 1/8/2009 at [facility] by Dr Christiano Castrejon, for a complex cystic lesion at the 12:00 RIGHT breast location.  The pathology from this returned as cystic apocrine metaplasia with increased fibrosis.   She had her annual mammogram 2/2/10 at [facility] that showed in the RIGHT breast, just lateral to the marker from the most recent biopsy, a circumscribed nodule 7mm in diameter, new compared to prior- read as BR0.      She has noted no masses in either breast, no skin changes, no nipple changes.  She does note some \"knottiness\" in the RIGHT UOQ that fluctuates in consistency and size.  SHe had additional imaging the day of her intiial visit that showed on the mammogram that the RIGHT mammo lesion compressed out, and on ultraosund, clustered microcysts at the 12:00 location.     Mrs. Garcia underwent a breast MRI on 9-17-10  that showed, in comparison to the 1-18-08 MRI-- reidentification of a 10x8mm oval well circumscribed nodule RIGHT breast 9:00, unchanged or possibly smaller, with benign imaging features likely a LN or FA. Ther eis a second smaller but similarly enhancing nosdule at the 9:00 lcoation posterior and superior to the preexisting nodule, also has benign features. Rec correlation with targeted ultrasound.  LEFT no findings.  She then had an ultraosund today of the RIGHT breast, that showed 2 aeparate solid lesions at " the 9:00 location,  by about 5 mm, that correlate to 2 findings from her MRI from 9-2010. THe first  stable on MRI, and 1.1 cm in size; the second new on the 9-2010 MRI, 9mm in size. Both with brenign imaging features oval circumscribed, likely fibroadenomas, BR3, rec 6 month FU ultrasound. Cash.  She has noted no new masses, skin changes, nipple changes, nipple discharge either breast.    Since our last visit, Mrs. Garcia has done well. She reports no new masses, skin changes, nipple changes, nipple discahrge from either breast. SHe does report some tenderness RIGHT breast laterally when she has stress. Her bilateral mammgoram and RIGHT US at [facility] from 2-21-11 showed 2 stable masses in the RIGHT breast at the 9:00 location, 5 CFN, imaging consistent with benign FA. Rec area reevaluated in 6months to ensure stability.  BR3.   Also rec continued MRI surveillance.    Since our last visit, Kacey had her imaging done, as below.  She had 2 RIGHT breast biopsies as below. Both returned as fibroadenomas. Other than tenderness related to the biopsy sites, she has noted no other changes in her exam- no masses, skin changes, nipple changes, nipple discharge.   She has gotten  since our last visit. SHe has had a 10 # weight gain related to stress.   [id number] Breast Ultrasound [facility] Kacey Garcia  Followup right breast nodule  Impression: Stable 10 mm to 11 mm nodule in the 9 o'clock position of the right breast 5 cm from the nipple. The second larger nodule has increased in size from the 02/21/2011 study and biopsy of this nodule is recommended.    11-10-11- Mrs [facility] MRI showed   Two well circumscribed enhancing masses at the 9-00      location within the right breast, one of which shows interval increase      in size since the preceding MRI dated 09/17/2010. These correspond to      solid masses shown on breast ultrasound. Evaluation with biopsy of the      enlarging mass is recommended. Biopsy of  the immediately adjacent stable      mass may also be prudent since it would obviate the need for additional      imaging followup if the mass proved to be, as expected,      histopathologically benign. There are no findings suspicious for      malignancy within the left breast.    Her US 11-7-11 showed 2 nodules at 9:00. The first is stable on  2-21-11 at 1.1x1.0 cm. The second larger lesion has enlarged to a size of  1.6x1.3 cm up from 1.2 cm in 2-2011. Biopsy of the second lesion due to enlargment recommended.     11-16-11- Her pathology returned as 9:00  FA and #2,  9:00, [facility] and FA.      Since our last visit, Kacey has done well. She has adjusted to being  and feels good. She has gained weight, but she thinks this is related to the stress of 2 jobs. She has noted no change sin her breast exam. No new masses, skin cahgnes, nipple changes, nipple discahrge eithe rbreast. Her most recent imagingi s from today 4-27-12 bilateral DX mammogram BR2 for post biopsy changes with 2 clips RIGHT. BR2.      Since our last visit, Mrs. Garcia has done well. She has noted no new masses, skin changes, nipple changes, nipple discharge either breast.   Her most recent imaging is a bilateral MRI [facility] 11-12-12 BR1 negative.  Her review of systems is unchanged other than  the above since 4-27-12.  I have reviewed the patient's Past Medical History, Family History, Social History, medications and allergies and confirm that the information is up to date and accurate.      Kacey has not seen us since December 2012.  She reports that in late November 2016, that she waxed her nipples 1 evening.  That night she had intercourse, and awoke the next morning with redness and warmth of the right breast and a small amount of drainage that had spotted on her T-shirt.  She has noted no additional nipple drainage.    She was treated with Keflex and her symptoms resolved.    She feels now like when she gets out of the shower the right nipple  may be a little bit darker than the left.      She noted no other masses, skin changes, prior to her most recent imaging.   Her most recent imaging includes a bilateral mammogram with 3-D December 14, 2016 at [facility] that was BI-RADS 2.  December 9, 2016 a right breast ultrasound also at [facility] for right nipple discharge, showed periareolar 1-2 minimally prominent ducts, benign.    She has had 2 right breast biopsies in the past that returned as fibroadenomata and fibrocystic change    She has her uterus and ovaries, is perimenopausal, and takes no hormones.  Her family history includes the following: She has one paternal aunt and 5 maternal aunts.  One maternal aunt had breast cancer at age 73 and her mother had breast cancer age 56.  She has a paternal grandmother with breast and ovarian cancer.  Her breast cancer was diagnosed around age 57.  No other breast or ovarian cancer in her family.        In the interim,  Kacey Garcia  has done well.  She has noted no changes in her breast exam. No new masses, skin changes, nipple changes, nipple discharge either breast.     Upon our advice, Kacey saw genetics and had [facility] genetics testing 3-21-17 of BRCA1-2 analysis with cancer next- showed a BRCA2 mutation [id number].     I arranged for her to have a breast MRI.  [facility] May 18, 2017 bilateral breast MRI. Right breast 9:30 bowtie shaped metallic clip- not the ribbon shaped marker which is located more anterior and inferior and not the third marker that is more central and 12:00. from surrounding enhancement that measures 1.2 cm in greatest dimension. Recommend stereotactic guided biopsy of the clip and subsequent surrounding enhancement. No findings suspicious on the left.     I reviewed the procedure notes from January 2009 in November 2011. In 2009 a barbed or knot shaped marker was left at the 12:00 location and the pathology returned as cystic apically metaplasia with  dense stromal fibrosis and was felt concordant. In November 2011 a right breast biopsy of 2 masses at 9:00 were done and both masses returned as fibroadenoma. The bowtie clip was posterior-superior in the ribbon clip was anterior inferior both at 9:00. I discussed the MRI results with Dr. Castrejon today. He felt that a second look ultrasound may be the next best step since the area of enhancement at 9:00 had increased T2 signal and this is favorable and he is previously sampled the masses. Therefore we arranged for her had a right breast Second Look ultrasound.    This was done yesterday May 24, 2017 and showed macrolobulated hypoechoic mass with an internal metallic clip 9:30 right breast 5 cm from the nipple.  Most consistent with fibroadenoma.  Consistent with a recurrent fibroadenoma previously biopsied.  6 month follow-up is recommended.  BI-RADS 3.    Her most recent mammogram was December 14, 2016 no radiographic evidence of malignancy.  BI-RADS 2.    She has had an intentional 22-1/2 pound weight loss.    In the interim, Kacey has talked with Dr. Hall about reconstruction and they have decided to proceed with expander reconstruction at the time of her surgery.  She has also discussed oophorectomy with Dr. Stahl who has deferred the timing to Kacey's preference.    She denies any changes in her exam.      In the interim,  Kacey Garcia  has done well.  She has noted no changes in her breast exam. No new masses, skin changes, nipple changes, nipple discharge either breast.   She denies headache, bone pain, belly pain, cough, changes in vision or gait.  Her most recent imaging includes the following:  December 21, 2017 bilateral mammary with 3-D [facility]: Scattered fibroglandular densities.  BI-RADS 1.     Right breast ultrasound: [facility]: There is been no interval change in size of a lobulated nodule 9:30, 5 cm from the nipple.  There is a postbiopsy marker within it.   It measures 1.6 cm.  BI-RADS 3 recommend 6 month follow-up ultrasound.    She underwent 2017 a lap cholecystectomy. Dr. Fritz Daigle.  She tells me that she was having intermittent abdominal pain for which she had no explanation, and is now feeling much better.  She saw her psychiatrist who recommended medication but she elected not to start this.  She tells me from a psychological standpoint she is doing better since the resolution of her pain.        In the interim,  Kacey Garcia  has done well.  She has noted no changes in her breast exam. No new masses, skin changes, nipple changes, nipple discharge either breast.   She denies headache, bone pain, belly pain, cough, changes in vision or gait.    Her most recent imaging includes the following:  [facility] breast MRI 2019: At the right breast 9:30, 5 cm from the nipple there is a metallic clip within a weakly enhancing mass measuring 1.6 cm consistent with fibroadenoma.  No findings suspicious in either breast BI-RADS 2.  [facility] 2018 right breast ultrasound: At 9:30, 5 cm from the nipple there is a 1.6 and meter lobulated mass slightly smaller than seen on the prior.  Clip is present.  Stable fibroadenoma.  BI-RADS 2.    Her weight is stable.  SHe has not seen her gynecologist for interval surveillance.    Interval History:  In the interim,  Kacey Garcia  has done well.  She is seeing Dr. Kayy Stahl and they have discussed removal of her ovaries and transvaginal ultrasound.  She is seeing Dr. Suarez for medical oncology and they have discussed risk reducing medications.  She comes today for follow-up.  She has noted no changes in her breast exam. No new masses, skin changes, nipple changes, nipple discharge either breast.   She denies headache, bone pain, belly pain, cough, changes in vision or gait.  Her most recent imaging includes the followin19- [facility]  Screen 3d mammogram- scatt fg densities-  BR1        Review of Systems:  Review of Systems   Constitutional: Positive for unexpected weight change (7 LB WT GAIN).   Cardiovascular: Positive for palpitations.   Psychiatric/Behavioral: Positive for sleep disturbance.   All other systems reviewed and are negative.       Past Medical and Surgical History:  Breast Biopsy History:  Patient has had the following breast biopsies:2 breast biopsies in the past- one RIGHT and one LEFT sided.  Both benign pathology per patient report.   Breast Cancer HIstory:  Patient does not have a past medical history of breast cancer.  Breast Operations, and year:  NONE   Obstetric/Gynecologic History:  Age menstrual periods began:13  Patient is premenopausal, first day of last period: 2017  Number of pregnancies:2  Number of live births: 1  Number of abortions or miscarriages: 1  Age of delivery of first child: 35  Patient breast fed, for the following lenth of time: 3 WEEKS   Length of time taking birth control pills: 1 MONTH   Patient has never taken hormone replacement  The patient still has her uterus and ovaries.      Past Surgical History:   Procedure Laterality Date   • BACK SURGERY  10/2013    Lumbar   • BASAL CELL CARCINOMA EXCISION Right     Face   • BREAST BIOPSY Left     Benign   • BREAST BIOPSY Right 2009    Complex cystic lesion   •  SECTION     • CHOLECYSTECTOMY WITH INTRAOPERATIVE CHOLANGIOGRAM N/A 2017    Procedure: CHOLECYSTECTOMY LAPAROSCOPIC INTRAOPERATIVE CHOLANGIOGRAM;  Surgeon: Tesfaye Daigle Jr., MD;  Location: [facility];  Service:    • COLONOSCOPY     • DILATATION AND CURETTAGE     • DISC REMOVAL     • SINUS SURGERY     • WISDOM TOOTH EXTRACTION       Past Medical History:   Diagnosis Date   • Anemia    • Anxiety    • Asthma    • Basal cell carcinoma     RIGHT FACE   • BRCA gene positive     BRCA2   • DDD (degenerative disc disease), cervical    • Fibrocystic breast    • GERD (gastroesophageal reflux disease)    •  Hashimoto's thyroiditis    • History of prior pregnancies     x2   • Irregular heart beat     TACHYCARDIA, SAW DR. KELLEY IN 2015.  NO FOLLOW UP REQUIRED   • Migraines    • Nonfunctioning gallbladder    • PONV (postoperative nausea and vomiting)    • Sinus disease    • Spinal headache    • Thyroid nodule    • TMJ (dislocation of temporomandibular joint)    • Varicose vein of leg    • Vitamin D deficiency        Prior Hospitalizations, other than for surgery or childbirth, and year:  NONE     Social History     Socioeconomic History   • Marital status:      Spouse name: Not on file   • Number of children: 1   • Years of education: College   • Highest education level: Not on file   Social Needs   • Financial resource strain: Not on file   • Food insecurity - worry: Not on file   • Food insecurity - inability: Not on file   • Transportation needs - medical: Not on file   • Transportation needs - non-medical: Not on file   Occupational History   • Occupation: ; Therapist, psychiatric     Employer: [employer]   Tobacco Use   • Smoking status: Never Smoker   • Smokeless tobacco: Never Used   Substance and Sexual Activity   • Alcohol use: No   • Drug use: No   • Sexual activity: Not on file   Other Topics Concern   • Not on file   Social History Narrative   • Not on file     Patient is .  Patient is employed full time with the following occupation:    Patient drinks 1 servings of caffeine per day.    Family History:  Family History   Problem Relation Age of Onset   • Cancer Mother         breast X 2 AGE 56-58   • Hypothyroidism Mother    • Heart defect Mother    • Depression Mother    • Heart failure Father    • Asthma Father    • Breast cancer Paternal Grandmother 45   • Ovarian cancer Paternal Grandmother    • Breast cancer Maternal Aunt 73   • Pancreatic cancer Maternal Uncle    • Prostate cancer Maternal Uncle    • Kidney cancer Maternal Uncle    • Hypothyroidism Other   "  • COPD Other    • Asthma Other    • Diabetes Other    • Asthma Sister         Rheumatoid   • Diabetes Maternal Grandmother    • Cancer Maternal Aunt         Bladder   • Malig Hyperthermia Neg Hx        Vital Signs:  /72 (BP Location: Left arm, Patient Position: Sitting, Cuff Size: Adult)   Pulse 76   Ht 157.5 cm (62\")   Wt 73 kg (161 lb)   LMP  (LMP Unknown)   SpO2 99%   Breastfeeding? Unknown   BMI 29.45 kg/m²      Medications:    Current Outpatient Medications:   •  ALPRAZolam (XANAX) 1 MG tablet, Take 0.25 mg by mouth 2 (Two) Times a Day As Needed for Anxiety or Sleep., Disp: , Rfl:   •  acetaminophen (TYLENOL) 325 MG tablet, Take 650 mg by mouth Every 6 (Six) Hours As Needed for Mild Pain ., Disp: , Rfl:   •  ibuprofen (ADVIL,MOTRIN) 200 MG tablet, Take 400 mg by mouth Every 6 (Six) Hours As Needed for Mild Pain . PT IS HOLDING FOR SURGERY, Disp: , Rfl:      Allergies:  Allergies   Allergen Reactions   • Codeine Other (See Comments)     Causes severe stomach pains   • Levaquin [Levofloxacin In D5w] Other (See Comments)     Pins and needles, weakness, nausea, affects bloodwork results   • Tequin [Gatifloxacin] Other (See Comments)     Pins and needles, weakness, nausea, affects bloodwork   • Bupropion Other (See Comments)     INCREASED BLOOD PRESSURE   • Citalopram Mental Status Change     TACHYCARDIA   • Hydrocodone Nausea And Vomiting   • Penicillins Rash       Physical Examination:  /72 (BP Location: Left arm, Patient Position: Sitting, Cuff Size: Adult)   Pulse 76   Ht 157.5 cm (62\")   Wt 73 kg (161 lb)   LMP  (LMP Unknown)   SpO2 99%   Breastfeeding? Unknown   BMI 29.45 kg/m²   General Appearance:  Patient is in no distress.  She is well kept and has an  overweight  build.   Psychiatric:  Patient with appropriate mood and affect. Alert and oriented to self, time, and place.    Breast, RIGHT: large  sized, symmetric with the contralateral side.  Breast skin is without erythema, " edema, rashes.  There is prominent hair centrally on each breast that is symmetric.  There are no visible abnormalities upon inspection during the arm-raising maneuver or with hands on hips in the sitting position. There is no nipple retraction, discharge or nipple/areolar skin changes.    There are no masses palpable in the sitting or supine positions. There is a well healed curvilinear incision in the LIQ from a past excision of a basal cell carcinoma.     Breast, LEFT:  large  sized, symmetric with the contralateral side.  Breast skin is without erythema, edema, rashes. There is prominent hair centrally on each breast that is symmetric.  There are no visible abnormalities upon inspection during the arm-raising maneuver or with hands on hips in the sitting position. There is no nipple retraction, discharge or nipple/areolar skin changes.There are no masses palpable in the sitting or supine positions.     Lymphatic:  There is no axillary, cervical, infraclavicular, or supraclavicular adenopathy bilaterally.  Eyes:  Pupils are round and reactive to light.  Cardiovascular:  Heart rate and rhythm are regular.  Respiratory:  Lungs are clear bilaterally with no crackles or wheezes in any lung field.  Gastrointestinal:  Abdomen is soft, nondistended, and nontender. There are no scars from previous surgery.   Musculoskeletal:  Good strength in all 4 extremities.  There is good range of motion in both shoulders.   Skin:  No new skin lesions or rashes on the skin excluding the breast (see breast exam above).    Imagin--10 additional diagnostic views and a targetted RIGHT US [facility] to further evaluate the RIGHT breast nodule seen on mammo- showed the RIGHT lesion compressed  out on diagnostic views, suggesting that there is not a true mass on her screening mammogram.  On targetted US, there were clustered microcysts around the 12:00 location, at the site of her prior biopsy.  No solid lesions or macrocysts.  BR2.    Breast MRI on 9-17-10  that showed, in comparison to the 1-18-08 MRI-- reidentification of a 10x8mm oval well circumscribed nodule RIGHT breast 9:00, unchanged or possibly smaller, with benign imaging features likely a LN or FA. Ther eis a second smaller but similarly enhancing nosdule at the 9:00 lcoation posterior and superior to the preexisting nodule, also has benign features. Rec correlation with targeted ultrasound.  LEFT no findings.  Ultrasound 10-11-10 of the RIGHT breast, that showed 2 separate solid lesions at the 9:00 location,  by about 5 mm, that correlate to 2 findings from her MRI from 9-2010. THe first  stable on MRI, and 1.1 cm in size; the second new on the 9-2010 MRI, 9mm in size. Both with brenign imaging features oval circumscribed, likely fibroadenomas, BR3, rec 6 month FU ultrasound. Cash.    Bilateral mammgoram and RIGHT US at [facility] from 2-21-11 showed 2 stable masses in the RIGHT breast at the 9:00 location, 5 CFN, imaging consistent with benign FA. Rec area reevaluated in 6months to ensure stability.  BR3.     11-10-11- [facility] MRI showed   Two well circumscribed enhancing masses at the 9-00      location within the right breast, one of which shows interval increase      in size since the preceding MRI dated 09/17/2010. These correspond to      solid masses shown on breast ultrasound. Evaluation with biopsy of the      enlarging mass is recommended. Biopsy of the immediately adjacent stable      mass may also be prudent since it would obviate the need for additional      imaging followup if the mass proved to be, as expected,      histopathologically benign. There are no findings suspicious for      malignancy within the left breast.    Her US 11-7-11 showed 2 nodules at 9:00. The first is stable on  2-21-11 at 1.1x1.0 cm. The second larger lesion has enlarged to a size of  1.6x1.3 cm up from 1.2 cm in 2-2011. Biopsy of the second lesion due to enlargment recommended.      04-27-12 Bilateral diagnostic mammogram [facility]  There are two metallic clips seen at the 9 o'clock position within the right breast biopsy-proven fibroadenoma. A metallic clip medial left breast as well as within the right breast at the 12 o'clock position.   Impression: There are no findings suspicious for malignancy in either breast.   BIRADS Category 2: Benign     11-12-12     Bilateral Breast MRI     [facility]    Kacey Willson  47 year-old female with right breast pain.  IMPRESSION:  There are no finding suspicious for malignancy in either breast.  No abnormality ot substantiate and/or explain the patient's right breast discomfort  Birads Category 1:  Negative    07-       [facility]    DIAGNOSTIC BILATERAL MAMMOGRAM                      KACEY WILLSON  HISTORY: Right lateral breast pain.  Moderately dense there is some mild skin retraction in the lateral aspect of the right breast.  IMPRESSION:  The ultrasound shows a hypoechoic nodule which may have represented a previously biopsied nodule and has relatively benign sonographic characteristics however short-term follow-up ultrasound of the right breast in approximately 3 months to 4 months recommended.  BIRADS 3    07-         [facility]             RIGHT BREAST ULTRASOUND                                KACEY WILLSON  9 o’clock to 10 o’clock right breast hypoechoic nodule measuring up to 1.6 cm x 1.1 cm x 5 mm. Patient had a nodule biopsied in this region on 11/14/2011 representing a fibroadenoma.  IMPRESSION:  Probably benign nodule in the 9 o’clock position of the right breast as described. Recommend follow-up right breast ultrasound 3 months to 4 months.      12-                [facility]     US                         RIGHT BREAST ULTRASOUND    KACEY WILLSON  HISTORY: Nonbloody right nipple discharge.  FINDINGS: Periareolar and retroareolar regions. 1 or 2 minimally prominent ducts are seen. No solid or  cystic masses.    12-09-16                          DR MARION ZHU                   EXTERNAL RESULT SCAN               WILLSON. PEEWEE  Mammogram and breast ultrasound are normal. She was treated for mastitis.        May 18, 2017 -[facility]-bilateral breast MRI. Right breast 9:30 bowtie shaped metallic clip- not the ribbon shaped marker which is located more anterior and inferior and not the third marker that is more central and 12:00. from surrounding enhancement that measures 1.2 cm in greatest dimension. Recommend stereotactic guided biopsy of the clip and subsequent surrounding enhancement. No findings suspicious on the left.    5-24-17 [facility] RIGHT second look ultrasound- macrolobulated mass at 9:30 consistent with recurrent fibroadenoma (seen on MRI).  BI-RADS 3 recommend 6 month follow-up.    December 21, 2017 bilateral mammary with 3-D [facility]: Scattered fibroglandular densities.  BI-RADS 1.    Right breast ultrasound: [facility]: There is been no interval change in size of a lobulated nodule 9:30, 5 cm from the nipple.  There is a postbiopsy marker within it.  It measures 1.6 cm.  BI-RADS 3 recommend 6 month follow-up ultrasound.    [facility] breast MRI June 4, 2019: At the right breast 9:30, 5 cm from the nipple there is a metallic clip within a weakly enhancing mass measuring 1.6 cm consistent with fibroadenoma.  No findings suspicious in either breast BI-RADS 2.    [facility] June 5, 2018 right breast ultrasound: At 9:30, 5 cm from the nipple there is a 1.6 and meter lobulated mass slightly smaller than seen on the prior.  Clip is present.  Stable fibroadenoma.  BI-RADS 2.    [id number]  Screen 3d mammogram- scatt fg densities- BR1      Pathology:  11-16-11- 2X RIGHT breast biopsies for breast masses enlarging -  9:00  FA and #2,  9:00, FCC and FA    Procedures:      Assessment:   Diagnosis Plan   1. BRCA2 genetic carrier  MRI  Breast Bilateral With & Without Contrast    Ambulatory Referral to Plastic Surgery   2. FH: breast cancer     3. FH: ovarian cancer     4. Fibrocystic breast disease (FCBD), unspecified laterality     5. Breast fibroadenoma, right        1-  3-21-17 Christy BRCA1-2 analysis with cancer next- showed a BRCA2 mutation [id number].      2-3  Mother age 56, 1/5 MA age 73  PGM breast age 57, ovarian age uncertain    5-  RIGHT MRI-area of enhancement at 9:00 had increased T2 signal- second look ultrasound BR3 for recurring FA 5-2017- BR3 on US , due 6-2018- RIGHT US 6-2018- stable 1.6 cm mass BR2 c/w FA    4- 1-2009- core biopsy [facility]- barbed or not shaped marker was left at the 12:00 location and the pathology returned as cystic apically metaplasia with dense stromal fibrosis and was felt concordant    11-16-11- 2X RIGHT breast biopsies for breast masses enlarging -  9:00  FA and #2,  9:00, FCC and FA- bowtie clip was posterior-superior in the ribbon clip was anterior inferior both at 9:00.        Plan:  Kacey is doing well today.  We reviewed her interval history, imaging, imaging reports together today as well as her exam.    She is still in a place where she is not wanting to have mastectomy.  I understand and told her that we would recommend bilateral risk reducing mastectomy as the greatest level of risk reduction to the tune of 90-95%.  However she has to be emotionally ready for this.  She understands that her other options for some risk reduction to a lesser level than mastectomy are risk reducing hormone blocking medications and oophorectomy.  She has spoken with both Dr. Stahl from gynecology and Dr. Suarez for medical oncology.    She is going to see Dr. Stahl at that back about oophorectomy.    I asked her if she felt comfortable with plastic surgery with Dr. Hall and she said she did but she would also like to talk with Dr. Duarte because she has heard good things about him.  Therefore we will make a  recommendation to see Dr. Duarte as well.    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  She knows that increased imaging surveillance does not equate with risk reduction.      Her next MRI will be due 6-5-19 [facility].  Her next bilateral mammogram at [facility] will be due 7/20 2019.     Her psychiatrist is Dr. Linda Tesfaye.     I asked her to continue her SBE and to call us int he interim with concerns and we'd be happy to see her back sooner.      Anna Duenas MD        We have spent 20 minutes in visit today, 15 in face to face .      Next Appointment:  Return for Next scheduled follow up, after imaging.       EMR Dragon/transcription disclaimer:    Much of this encounter note is an electronic transcription/translocation of spoken language to printed text.  The electronic translation of spoken language may permit erroneous, or at times, nonsensical words or phrases to be inadvertently transcribed.  Although I have reviewed the note from such areas, some may still exist.

## 2018-12-28 ENCOUNTER — TELEPHONE (OUTPATIENT)
Dept: SURGERY | Facility: CLINIC | Age: 53
End: 2018-12-28

## 2019-02-05 ENCOUNTER — TELEPHONE (OUTPATIENT)
Dept: SURGERY | Facility: CLINIC | Age: 54
End: 2019-02-05

## 2019-02-05 NOTE — TELEPHONE ENCOUNTER
Note from Dr. Bony Duarte dated January 30, 2019.  Patient is considering proceeding ahead with mastectomy reconstruction.  After consultation she is going to consider all of her options and we'll let him know if she wants to proceed.  Discussed subpectoral and prepectoral reconstruction.

## 2019-06-18 ENCOUNTER — TELEPHONE (OUTPATIENT)
Dept: SURGERY | Facility: CLINIC | Age: 54
End: 2019-06-18

## 2019-06-26 ENCOUNTER — OFFICE VISIT (OUTPATIENT)
Dept: GASTROENTEROLOGY | Facility: CLINIC | Age: 54
End: 2019-06-26

## 2019-06-26 VITALS
HEIGHT: 62 IN | WEIGHT: 161.2 LBS | SYSTOLIC BLOOD PRESSURE: 128 MMHG | TEMPERATURE: 98 F | BODY MASS INDEX: 29.66 KG/M2 | DIASTOLIC BLOOD PRESSURE: 74 MMHG

## 2019-06-26 DIAGNOSIS — K62.5 RECTAL BLEEDING: Primary | ICD-10-CM

## 2019-06-26 PROCEDURE — 99204 OFFICE O/P NEW MOD 45 MIN: CPT | Performed by: INTERNAL MEDICINE

## 2019-06-26 NOTE — PROGRESS NOTES
Chief Complaint   Patient presents with   • Constipation   • Hemorrhoids       History of Present Illness: 53-year-old female c/o constipation and some hemorrhoidal itching. She saw Dr. Garza who put her on stool softener and suppositories. The constipation hasn't gone away completely yet. She will get some diarrhea prior to period. Some anal itching/burning. She has never had a colonoscopy. NO melena. Some mild rectal blood on the tissue. Sometimes she has epigastric pain worse in the AM. No association with eating. 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 She sits lots. NO nausea or vomiting. NO fevers, chills. Weight stable.     Past Medical History:   Diagnosis Date   • Anemia    • Anxiety    • Asthma    • Basal cell carcinoma     RIGHT FACE   • BRCA gene positive     BRCA2   • DDD (degenerative disc disease), cervical    • Fibrocystic breast    • GERD (gastroesophageal reflux disease)    • Hashimoto's thyroiditis    • History of prior pregnancies     x2   • Irregular heart beat     TACHYCARDIA, SAW DR. KELLEY IN .  NO FOLLOW UP REQUIRED   • Migraines    • Nonfunctioning gallbladder    • PONV (postoperative nausea and vomiting)    • Sinus disease    • Spinal headache    • Thyroid nodule    • TMJ (dislocation of temporomandibular joint)    • Varicose vein of leg    • Vitamin D deficiency        Past Surgical History:   Procedure Laterality Date   • BACK SURGERY  10/2013    Lumbar   • BASAL CELL CARCINOMA EXCISION Right     Face   • BREAST BIOPSY Left     Benign   • BREAST BIOPSY Right 2009    Complex cystic lesion   •  SECTION     • CHOLECYSTECTOMY      Dr. Tesfaye Daigle   • CHOLECYSTECTOMY WITH INTRAOPERATIVE CHOLANGIOGRAM N/A 2017    Procedure: CHOLECYSTECTOMY LAPAROSCOPIC INTRAOPERATIVE CHOLANGIOGRAM;  Surgeon: Tesfaye Daigle Jr., MD;  Location: [facility];  Service:    • COLONOSCOPY     • DILATATION AND CURETTAGE     • DISC REMOVAL     • SINUS SURGERY     • WISDOM TOOTH EXTRACTION            Current Outpatient Medications:   •  acetaminophen (TYLENOL) 325 MG tablet, Take 650 mg by mouth Every 6 (Six) Hours As Needed for Mild Pain ., Disp: , Rfl:   •  ALPRAZolam (XANAX) 1 MG tablet, Take 0.25 mg by mouth 2 (Two) Times a Day As Needed for Anxiety or Sleep., Disp: , Rfl:     Allergies   Allergen Reactions   • Codeine Other (See Comments)     Causes severe stomach pains   • Levaquin [Levofloxacin In D5w] Other (See Comments)     Pins and needles, weakness, nausea, affects bloodwork results   • Tequin [Gatifloxacin] Other (See Comments)     Pins and needles, weakness, nausea, affects bloodwork   • Bupropion Other (See Comments)     INCREASED BLOOD PRESSURE   • Citalopram Mental Status Change     TACHYCARDIA   • Hydrocodone Nausea And Vomiting   • Penicillins Rash       Family History   Problem Relation Age of Onset   • Cancer Mother         breast X 2 AGE 56-58   • Hypothyroidism Mother    • Heart defect Mother    • Depression Mother    • Heart failure Father    • Asthma Father    • Breast cancer Paternal Grandmother 45   • Ovarian cancer Paternal Grandmother    • Breast cancer Maternal Aunt 73   • Pancreatic cancer Maternal Uncle    • Prostate cancer Maternal Uncle    • Kidney cancer Maternal Uncle    • Hypothyroidism Other    • COPD Other    • Asthma Other    • Diabetes Other    • Asthma Sister         Rheumatoid   • Diabetes Maternal Grandmother    • Cancer Maternal Aunt         Bladder   • Malig Hyperthermia Neg Hx        Social History     Socioeconomic History   • Marital status:      Spouse name: Not on file   • Number of children: 1   • Years of education: College   • Highest education level: Not on file   Occupational History   • Occupation: ; Therapist, psychiatric     Employer: [employer]   Tobacco Use   • Smoking status: Never Smoker   • Smokeless tobacco: Never Used   Substance and Sexual Activity   • Alcohol use: No   • Drug use: No       Review of Systems    All other systems reviewed and are negative.      Vitals:    06/26/19 1437   BP: 128/74   Temp: 98 °F (36.7 °C)       Physical Exam   Constitutional: She is oriented to person, place, and time. She appears well-developed and well-nourished. No distress.   HENT:   Head: Normocephalic and atraumatic. Hair is normal.   Right Ear: Hearing, tympanic membrane, external ear and ear canal normal. No drainage. No decreased hearing is noted.   Left Ear: Hearing, tympanic membrane, external ear and ear canal normal. No decreased hearing is noted.   Nose: No nasal deformity.   Mouth/Throat: Oropharynx is clear and moist.   Eyes: Conjunctivae, EOM and lids are normal. Pupils are equal, round, and reactive to light. Right eye exhibits no discharge. Left eye exhibits no discharge.   Neck: Normal range of motion. Neck supple. No JVD present. No tracheal deviation present. No thyromegaly present.   Cardiovascular: Normal rate, regular rhythm, normal heart sounds, intact distal pulses and normal pulses. Exam reveals no gallop and no friction rub.   No murmur heard.  Pulmonary/Chest: Effort normal and breath sounds normal. No respiratory distress. She has no wheezes. She has no rales. She exhibits no tenderness.   Abdominal: Soft. Bowel sounds are normal. She exhibits no distension and no mass. There is no tenderness. There is no rebound and no guarding. No hernia.   Genitourinary: Rectal exam shows guaiac negative stool.   Musculoskeletal: Normal range of motion. She exhibits no edema, tenderness or deformity.   Lymphadenopathy:     She has no cervical adenopathy.   Neurological: She is alert and oriented to person, place, and time. She has normal reflexes. She displays normal reflexes. No cranial nerve deficit. She exhibits normal muscle tone. Coordination normal.   Skin: Skin is warm and dry. No rash noted. She is not diaphoretic. No erythema.   Psychiatric: She has a normal mood and affect. Her behavior is normal. Judgment and  thought content normal.   Vitals reviewed.      Kaecy was seen today for constipation and hemorrhoids.    Diagnoses and all orders for this visit:    Rectal bleeding  -     Case Request; Standing  -     Case Request    Other orders  -     Follow Anesthesia Guidelines / Standing Orders; Future  -     Obtain Informed Consent; Future  -     Implement Anesthesia orders day of procedure.; Standing  -     Obtain informed consent; Standing  -     Verify bowel prep was successful; Standing  -     Give tap water enema if bowel prep was insufficient; Standing      Assessment:  1. Rectal bleeding  2) Epigastric pain.  3) Anal itching and burning.   4) Constipation    Recommendations:  1. Get labs from Dr. Garza  2) Colonoscopy. She doesn't want an EGD.  3) Drink more water and eat more fruits and veggies.  4) Sitz baths followed by using Preparation H with hydrocortisone cream to the perianal area.     No Follow-up on file.    Naif Pérez MD  6/26/2019

## 2019-11-21 ENCOUNTER — TELEPHONE (OUTPATIENT)
Dept: SURGERY | Facility: CLINIC | Age: 54
End: 2019-11-21

## 2019-11-21 NOTE — TELEPHONE ENCOUNTER
Moved pt's appointment off of 1-3-20  (provider out of office)      Rescheduled to see Dr MARIE  1-21-20 arrive 12:45      LM for pt to call and confirm.  antoinette

## 2019-12-20 ENCOUNTER — HOSPITAL ENCOUNTER (OUTPATIENT)
Dept: MRI IMAGING | Facility: HOSPITAL | Age: 54
Discharge: HOME | End: 2019-12-20
Admitting: SURGERY

## 2019-12-20 DIAGNOSIS — Z15.09 BRCA2 GENETIC CARRIER: ICD-10-CM

## 2019-12-20 DIAGNOSIS — Z15.01 BRCA2 GENETIC CARRIER: ICD-10-CM

## 2019-12-20 PROCEDURE — 77049 MRI BREAST C-+ W/CAD BI: CPT

## 2019-12-20 PROCEDURE — 0 GADOBENATE DIMEGLUMINE 529 MG/ML SOLUTION: Performed by: SURGERY

## 2019-12-20 PROCEDURE — 82565 ASSAY OF CREATININE: CPT

## 2019-12-20 PROCEDURE — A9577 INJ MULTIHANCE: HCPCS | Performed by: SURGERY

## 2019-12-20 RX ADMIN — GADOBENATE DIMEGLUMINE 14 ML: 529 INJECTION, SOLUTION INTRAVENOUS at 14:16

## 2019-12-21 LAB — CREAT BLDA-MCNC: 0.9 MG/DL (ref 0.6–1.3)

## 2019-12-27 ENCOUNTER — TELEPHONE (OUTPATIENT)
Dept: SURGERY | Facility: CLINIC | Age: 54
End: 2019-12-27

## 2019-12-27 NOTE — TELEPHONE ENCOUNTER
Bilateral breast MRI [facility] December 23, 2019: No findings suspicious for malignancy in either breast BI-RADS 1

## 2020-01-17 ENCOUNTER — TELEPHONE (OUTPATIENT)
Dept: SURGERY | Facility: CLINIC | Age: 55
End: 2020-01-17

## 2020-01-17 ENCOUNTER — TRANSCRIBE ORDERS (OUTPATIENT)
Dept: SURGERY | Facility: CLINIC | Age: 55
End: 2020-01-17

## 2020-01-17 DIAGNOSIS — Z12.31 VISIT FOR SCREENING MAMMOGRAM: Primary | ICD-10-CM

## 2020-01-17 NOTE — TELEPHONE ENCOUNTER
Scheduled Bilateral 3D Screen Mammo  [facility] 1-23-20 arrive 3:15      Pt is aware of appointment  antoinette

## 2020-01-28 ENCOUNTER — TELEPHONE (OUTPATIENT)
Dept: SURGERY | Facility: CLINIC | Age: 55
End: 2020-01-28

## 2020-04-06 ENCOUNTER — TELEPHONE (OUTPATIENT)
Dept: SURGERY | Facility: CLINIC | Age: 55
End: 2020-04-06

## 2020-04-06 NOTE — TELEPHONE ENCOUNTER
Pt no showed for her imaging on 3-31-20    I have rescheduled this to 5-7-20 @ 2:00    Return to see Dr MARIE 5- @ 10:30      V/m full-could not leave message.    marco

## 2020-04-07 ENCOUNTER — TELEPHONE (OUTPATIENT)
Dept: SURGERY | Facility: CLINIC | Age: 55
End: 2020-04-07

## 2020-04-07 NOTE — TELEPHONE ENCOUNTER
Reassured patient breast MRI 12/2019 was normal. Patient confirmed understanding.   She did not have MMG 2020. Patient rescheduled this to 5/7/2020. This will need to be pushed out due to COVID19.

## 2020-04-21 ENCOUNTER — TELEPHONE (OUTPATIENT)
Dept: SURGERY | Facility: CLINIC | Age: 55
End: 2020-04-21

## 2020-04-21 NOTE — TELEPHONE ENCOUNTER
Canceled 5-15-20 with Dr MARIE  Patient is having mammogram 5-7-2020    Depending on results of mammogram  Pt might be scheduled for mammo and mri in December and see   Dr MARIE back.      * Dr MARIE will let us know after results of mammogram in May.      Pt v/u    Verónica

## 2020-05-07 ENCOUNTER — HOSPITAL ENCOUNTER (OUTPATIENT)
Dept: MAMMOGRAPHY | Facility: HOSPITAL | Age: 55
Discharge: HOME | End: 2020-05-07
Admitting: SURGERY

## 2020-05-07 DIAGNOSIS — Z12.31 VISIT FOR SCREENING MAMMOGRAM: ICD-10-CM

## 2020-05-07 PROCEDURE — 77063 BREAST TOMOSYNTHESIS BI: CPT

## 2020-05-07 PROCEDURE — 77067 SCR MAMMO BI INCL CAD: CPT

## 2020-05-13 ENCOUNTER — TELEPHONE (OUTPATIENT)
Dept: SURGERY | Facility: CLINIC | Age: 55
End: 2020-05-13

## 2020-06-10 ENCOUNTER — OFFICE VISIT (OUTPATIENT)
Dept: SURGERY | Facility: CLINIC | Age: 55
End: 2020-06-10

## 2020-06-10 ENCOUNTER — TELEPHONE (OUTPATIENT)
Dept: SURGERY | Facility: CLINIC | Age: 55
End: 2020-06-10

## 2020-06-10 VITALS
BODY MASS INDEX: 27.6 KG/M2 | HEIGHT: 62 IN | HEART RATE: 69 BPM | SYSTOLIC BLOOD PRESSURE: 122 MMHG | WEIGHT: 150 LBS | TEMPERATURE: 98.6 F | DIASTOLIC BLOOD PRESSURE: 68 MMHG

## 2020-06-10 DIAGNOSIS — Z80.41 FH: OVARIAN CANCER: ICD-10-CM

## 2020-06-10 DIAGNOSIS — Z15.01 BRCA2 GENETIC CARRIER: Primary | ICD-10-CM

## 2020-06-10 DIAGNOSIS — N60.19 FIBROCYSTIC BREAST DISEASE (FCBD), UNSPECIFIED LATERALITY: ICD-10-CM

## 2020-06-10 DIAGNOSIS — Z80.3 FH: BREAST CANCER: ICD-10-CM

## 2020-06-10 DIAGNOSIS — D24.1 BREAST FIBROADENOMA, RIGHT: ICD-10-CM

## 2020-06-10 DIAGNOSIS — Z15.09 BRCA2 GENETIC CARRIER: Primary | ICD-10-CM

## 2020-06-10 PROBLEM — J45.909 ASTHMA: Status: ACTIVE | Noted: 2020-06-10

## 2020-06-10 PROBLEM — M51.369 BULGING LUMBAR DISC: Status: ACTIVE | Noted: 2020-06-10

## 2020-06-10 PROBLEM — M51.36 BULGING LUMBAR DISC: Status: ACTIVE | Noted: 2020-06-10

## 2020-06-10 PROBLEM — J30.2 SEASONAL ALLERGIES: Status: ACTIVE | Noted: 2020-06-10

## 2020-06-10 PROCEDURE — 99213 OFFICE O/P EST LOW 20 MIN: CPT | Performed by: SURGERY

## 2020-06-10 NOTE — PROGRESS NOTES
"Chief Complaint: Kacey Garcia is a 54 y.o. female who was seen in consultation at the request of Kayy Stahl MD  for BRCA2 genetic carrier, Abnormal MRI, Breast, Abnormal ultrasound, Breast, FH: Breast cancer, FH: Ovarian cancer, Fibroadenoma of breast, right    History of Present Illness:  Ms Garcia has a significant family history of breast cancer, including her mother diagnosed at age 58 and her paternal grandmother diagnosed at age 45.  She has had 2 breast biopsies in the past. The first was in 2008, LEFT breast,  by Dr Russell at [facility] and benign;  and the most recent biopsy was performed 1/8/2009 at [facility] by Dr Christiano Castrejon, for a complex cystic lesion at the 12:00 RIGHT breast location.  The pathology from this returned as cystic apocrine metaplasia with increased fibrosis.   She had her annual mammogram 2/2/10 at [facility] that showed in the RIGHT breast, just lateral to the marker from the most recent biopsy, a circumscribed nodule 7mm in diameter, new compared to prior- read as BR0.      She has noted no masses in either breast, no skin changes, no nipple changes.  She does note some \"knottiness\" in the RIGHT UOQ that fluctuates in consistency and size.  SHe had additional imaging the day of her intiial visit that showed on the mammogram that the RIGHT mammo lesion compressed out, and on ultraosund, clustered microcysts at the 12:00 location.     Mrs. Garcia underwent a breast MRI on 9-17-10  that showed, in comparison to the 1-18-08 MRI-- reidentification of a 10x8mm oval well circumscribed nodule RIGHT breast 9:00, unchanged or possibly smaller, with benign imaging features likely a LN or FA. Ther eis a second smaller but similarly enhancing nosdule at the 9:00 lcoation posterior and superior to the preexisting nodule, also has benign features. Rec correlation with targeted ultrasound.  LEFT no findings.  She then had an ultraosund today of the RIGHT breast, that showed 2 aeparate solid lesions at " the 9:00 location,  by about 5 mm, that correlate to 2 findings from her MRI from 9-2010. THe first  stable on MRI, and 1.1 cm in size; the second new on the 9-2010 MRI, 9mm in size. Both with brenign imaging features oval circumscribed, likely fibroadenomas, BR3, rec 6 month FU ultrasound. Cash.  She has noted no new masses, skin changes, nipple changes, nipple discharge either breast.    Since our last visit, Mrs. Garcia has done well. She reports no new masses, skin changes, nipple changes, nipple discahrge from either breast. SHe does report some tenderness RIGHT breast laterally when she has stress. Her bilateral mammgoram and RIGHT US at [facility] from 2-21-11 showed 2 stable masses in the RIGHT breast at the 9:00 location, 5 CFN, imaging consistent with benign FA. Rec area reevaluated in 6months to ensure stability.  BR3.   Also rec continued MRI surveillance.    Since our last visit, Kacey had her imaging done, as below.  She had 2 RIGHT breast biopsies as below. Both returned as fibroadenomas. Other than tenderness related to the biopsy sites, she has noted no other changes in her exam- no masses, skin changes, nipple changes, nipple discharge.   She has gotten  since our last visit. SHe has had a 10 # weight gain related to stress.   [id number] Breast Ultrasound [facility] Kacey Garcia  Followup right breast nodule  Impression: Stable 10 mm to 11 mm nodule in the 9 o'clock position of the right breast 5 cm from the nipple. The second larger nodule has increased in size from the 02/21/2011 study and biopsy of this nodule is recommended.    11-10-11- Mrs Hoyos MRI showed   Two well circumscribed enhancing masses at the 9-00      location within the right breast, one of which shows interval increase      in size since the preceding MRI dated 09/17/2010. These correspond to      solid masses shown on breast ultrasound. Evaluation with biopsy of the      enlarging mass is recommended. Biopsy of  the immediately adjacent stable      mass may also be prudent since it would obviate the need for additional      imaging followup if the mass proved to be, as expected,      histopathologically benign. There are no findings suspicious for      malignancy within the left breast.    Her US 11-7-11 showed 2 nodules at 9:00. The first is stable on  2-21-11 at 1.1x1.0 cm. The second larger lesion has enlarged to a size of  1.6x1.3 cm up from 1.2 cm in 2-2011. Biopsy of the second lesion due to enlargment recommended.     11-16-11- Her pathology returned as 9:00  FA and #2,  9:00, [facility] and FA.      Since our last visit, Kacey has done well. She has adjusted to being  and feels good. She has gained weight, but she thinks this is related to the stress of 2 jobs. She has noted no change sin her breast exam. No new masses, skin cahgnes, nipple changes, nipple discahrge eithe rbreast. Her most recent imagingi s from today 4-27-12 bilateral DX mammogram BR2 for post biopsy changes with 2 clips RIGHT. BR2.      Since our last visit, Mrs. Garcia has done well. She has noted no new masses, skin changes, nipple changes, nipple discharge either breast.   Her most recent imaging is a bilateral MRI [facility] 11-12-12 BR1 negative.  Her review of systems is unchanged other than  the above since 4-27-12.  I have reviewed the patient's Past Medical History, Family History, Social History, medications and allergies and confirm that the information is up to date and accurate.      Kacey has not seen us since December 2012.  She reports that in late November 2016, that she waxed her nipples 1 evening.  That night she had intercourse, and awoke the next morning with redness and warmth of the right breast and a small amount of drainage that had spotted on her T-shirt.  She has noted no additional nipple drainage.    She was treated with Keflex and her symptoms resolved.    She feels now like when she gets out of the shower the right nipple  may be a little bit darker than the left.      She noted no other masses, skin changes, prior to her most recent imaging.   Her most recent imaging includes a bilateral mammogram with 3-D December 14, 2016 at [facility] that was BI-RADS 2.  December 9, 2016 a right breast ultrasound also at [facility] for right nipple discharge, showed periareolar 1-2 minimally prominent ducts, benign.    She has had 2 right breast biopsies in the past that returned as fibroadenomata and fibrocystic change    She has her uterus and ovaries, is perimenopausal, and takes no hormones.  Her family history includes the following: She has one paternal aunt and 5 maternal aunts.  One maternal aunt had breast cancer at age 73 and her mother had breast cancer age 56.  She has a paternal grandmother with breast and ovarian cancer.  Her breast cancer was diagnosed around age 57.  No other breast or ovarian cancer in her family.        In the interim,  Kacey Garcia  has done well.  She has noted no changes in her breast exam. No new masses, skin changes, nipple changes, nipple discharge either breast.     Upon our advice, Kacey saw genetics and had [facility] testing 3-21-17 of BRCA1-2 analysis with cancer next- showed a BRCA2 mutation [id number].     I arranged for her to have a breast MRI.  [facility] May 18, 2017 bilateral breast MRI. Right breast 9:30 bowtie shaped metallic clip- not the ribbon shaped marker which is located more anterior and inferior and not the third marker that is more central and 12:00. from surrounding enhancement that measures 1.2 cm in greatest dimension. Recommend stereotactic guided biopsy of the clip and subsequent surrounding enhancement. No findings suspicious on the left.     I reviewed the procedure notes from January 2009 in November 2011. In 2009 a barbed or knot shaped marker was left at the 12:00 location and the pathology returned as cystic apically metaplasia with  dense stromal fibrosis and was felt concordant. In November 2011 a right breast biopsy of 2 masses at 9:00 were done and both masses returned as fibroadenoma. The bowtie clip was posterior-superior in the ribbon clip was anterior inferior both at 9:00. I discussed the MRI results with Dr. Castrejon today. He felt that a second look ultrasound may be the next best step since the area of enhancement at 9:00 had increased T2 signal and this is favorable and he is previously sampled the masses. Therefore we arranged for her had a right breast Second Look ultrasound.    This was done yesterday May 24, 2017 and showed macrolobulated hypoechoic mass with an internal metallic clip 9:30 right breast 5 cm from the nipple.  Most consistent with fibroadenoma.  Consistent with a recurrent fibroadenoma previously biopsied.  6 month follow-up is recommended.  BI-RADS 3.    Her most recent mammogram was December 14, 2016 no radiographic evidence of malignancy.  BI-RADS 2.    She has had an intentional 22-1/2 pound weight loss.    In the interim, Kacey has talked with Dr. Hall about reconstruction and they have decided to proceed with expander reconstruction at the time of her surgery.  She has also discussed oophorectomy with Dr. Stahl who has deferred the timing to Kacey's preference.    She denies any changes in her exam.      In the interim,  Kacey Garcia  has done well.  She has noted no changes in her breast exam. No new masses, skin changes, nipple changes, nipple discharge either breast.   She denies headache, bone pain, belly pain, cough, changes in vision or gait.  Her most recent imaging includes the following:  December 21, 2017 bilateral mammary with 3-D [facility]: Scattered fibroglandular densities.  BI-RADS 1.     Right breast ultrasound: [facility]: There is been no interval change in size of a lobulated nodule 9:30, 5 cm from the nipple.  There is a postbiopsy marker within it.   It measures 1.6 cm.  BI-RADS 3 recommend 6 month follow-up ultrasound.    She underwent 2017 a lap cholecystectomy. Dr. Fritz Daigle.  She tells me that she was having intermittent abdominal pain for which she had no explanation, and is now feeling much better.  She saw her psychiatrist who recommended medication but she elected not to start this.  She tells me from a psychological standpoint she is doing better since the resolution of her pain.        In the interim,  Kacey Garcia  has done well.  She has noted no changes in her breast exam. No new masses, skin changes, nipple changes, nipple discharge either breast.   She denies headache, bone pain, belly pain, cough, changes in vision or gait.    Her most recent imaging includes the following:  [facility] breast MRI 2019: At the right breast 9:30, 5 cm from the nipple there is a metallic clip within a weakly enhancing mass measuring 1.6 cm consistent with fibroadenoma.  No findings suspicious in either breast BI-RADS 2.  [facility] 2018 right breast ultrasound: At 9:30, 5 cm from the nipple there is a 1.6 and meter lobulated mass slightly smaller than seen on the prior.  Clip is present.  Stable fibroadenoma.  BI-RADS 2.    Her weight is stable.  SHe has not seen her gynecologist for interval surveillance.      In the interim,  Kacey Garcia  has done well.  She is seeing Dr. Kayy Stahl and they have discussed removal of her ovaries and transvaginal ultrasound.  She is seeing Dr. Suarez for medical oncology and they have discussed risk reducing medications.  She comes today for follow-up.  She has noted no changes in her breast exam. No new masses, skin changes, nipple changes, nipple discharge either breast.   She denies headache, bone pain, belly pain, cough, changes in vision or gait.  Her most recent imaging includes the followin-19-- [facility]  Screen 3d mammogram- scatt fg densities- BR1    Interval  History:  In the interim,  Kacey Garcia  has done well.  She has noted no changes in her breast exam. No new masses, skin changes, nipple changes, nipple discharge either breast.   She denies headache, bone pain, belly pain, cough, changes in vision or gait.    Her most recent imaging includes the followin-7-20 [facility] mammogram screen with rochelle-  Scattered fg densities  BR1    SHe has lost 11 pounds, intentional.  She has started on an antidepressant after the COVID anxiety pushed her to her limit, and this has made her feel better.  SHe has started dating in September.    She is here fore review.    Review of Systems:  Review of Systems   Constitutional: Positive for unexpected weight change (11lb weight loss).   Cardiovascular: Positive for palpitations.   Psychiatric/Behavioral: Positive for sleep disturbance.   All other systems reviewed and are negative.       Past Medical and Surgical History:  Breast Biopsy History:  Patient has had the following breast biopsies:2 breast biopsies in the past- one RIGHT and one LEFT sided.  Both benign pathology per patient report.   Breast Cancer HIstory:  Patient does not have a past medical history of breast cancer.  Breast Operations, and year:  NONE   Obstetric/Gynecologic History:  Age menstrual periods began:13  Patient is premenopausal, first day of last period: 2017  Number of pregnancies:2  Number of live births: 1  Number of abortions or miscarriages: 1  Age of delivery of first child: 35  Patient breast fed, for the following lenth of time: 3 WEEKS   Length of time taking birth control pills: 1 MONTH   Patient has never taken hormone replacement  The patient still has her uterus and ovaries.      Past Surgical History:   Procedure Laterality Date   • BASAL CELL CARCINOMA EXCISION Right     Face. RIGHT BREAST   • BREAST BIOPSY Left     Benign   • BREAST BIOPSY Right 2009    Complex cystic lesion   •  SECTION     • CHOLECYSTECTOMY WITH  INTRAOPERATIVE CHOLANGIOGRAM N/A 11/20/2017    Procedure: CHOLECYSTECTOMY LAPAROSCOPIC INTRAOPERATIVE CHOLANGIOGRAM;  Surgeon: Tesfaye Daigle Jr., MD;  Location: [facility];  Service:    • DILATATION AND CURETTAGE     • LUMBAR DISC SURGERY     • SINUS SURGERY     • WISDOM TOOTH EXTRACTION       Past Medical History:   Diagnosis Date   • Anxiety    • Asthma    • Basal cell carcinoma     RIGHT FACE, LEFT BREAST   • BRCA gene positive     BRCA2   • Chronic sinusitis    • Constipation     WITH RECTAL BLEEDING PER DR TROY'S OFFICE NOTE   • DDD (degenerative disc disease), cervical    • Fibrocystic breast    • GERD (gastroesophageal reflux disease)    • Hashimoto's thyroiditis    • History of anemia    • History of migraine    • History of prior pregnancies     x2   • Irregular heart beat     TACHYCARDIA, SAW DR. KELLEY IN 2015.  NO FOLLOW UP REQUIRED   • PONV (postoperative nausea and vomiting)    • S/P skin biopsy     R EAR, R BREAST, LOWER LEFT LEG, L THIGN   • Spinal headache    • Thyroid nodule     WITH GOITER   • TMJ (dislocation of temporomandibular joint)    • Varicose vein of leg    • Vitamin D deficiency        Prior Hospitalizations, other than for surgery or childbirth, and year:  NONE     Social History     Socioeconomic History   • Marital status:      Spouse name: Not on file   • Number of children: 1   • Years of education: College   • Highest education level: Not on file   Occupational History   • Occupation: ; Therapist, psychiatric     Employer: [employer]   Tobacco Use   • Smoking status: Never Smoker   • Smokeless tobacco: Never Used   Substance and Sexual Activity   • Alcohol use: No   • Drug use: No     Patient is .  Patient is employed full time with the following occupation:    Patient drinks 1 servings of caffeine per day.    Family History:  Family History   Problem Relation Age of Onset   • Cancer Mother         breast X 2 AGE 56-58   •  "Hypothyroidism Mother    • Heart defect Mother    • Depression Mother    • Heart failure Father    • Asthma Father    • Breast cancer Paternal Grandmother 45   • Ovarian cancer Paternal Grandmother    • Breast cancer Maternal Aunt 73   • Pancreatic cancer Maternal Uncle    • Prostate cancer Maternal Uncle    • Kidney cancer Maternal Uncle    • Hypothyroidism Other    • COPD Other    • Asthma Other    • Diabetes Other    • Asthma Sister         Rheumatoid   • Diabetes Maternal Grandmother    • Cancer Maternal Aunt         Bladder   • Malig Hyperthermia Neg Hx        Vital Signs:  /68 (BP Location: Right arm, Patient Position: Sitting, Cuff Size: Adult)   Pulse 69   Temp 98.6 °F (37 °C)   Ht 157.5 cm (62\")   Wt 68 kg (150 lb)   BMI 27.44 kg/m²      Medications:    Current Outpatient Medications:   •  acetaminophen (TYLENOL) 325 MG tablet, Take 650 mg by mouth Every 6 (Six) Hours As Needed for Mild Pain ., Disp: , Rfl:   •  ALPRAZolam (XANAX) 1 MG tablet, Take 0.25 mg by mouth 2 (Two) Times a Day As Needed for Anxiety or Sleep., Disp: , Rfl:   •  cyclobenzaprine (FLEXERIL) 5 MG tablet, Take 1 tablet by mouth 3 (Three) Times a Day As Needed for Muscle Spasms., Disp: 15 tablet, Rfl: 0  •  doxycycline (MONODOX) 100 MG capsule, Take 1 capsule by mouth 2 (Two) Times a Day., Disp: 14 capsule, Rfl: 0  •  gabapentin (NEURONTIN) 300 MG capsule, Take 1 capsule by mouth 3 (Three) Times a Day As Needed (for sciatic pain.)., Disp: 20 capsule, Rfl: 0     Allergies:  Allergies   Allergen Reactions   • Codeine Other (See Comments)     Causes severe stomach pains   • Levaquin [Levofloxacin In D5w] Other (See Comments)     Pins and needles, weakness, nausea, affects bloodwork results   • Tequin [Gatifloxacin] Other (See Comments)     Pins and needles, weakness, nausea, affects bloodwork   • Bupropion Other (See Comments)     INCREASED BLOOD PRESSURE   • Citalopram Mental Status Change     TACHYCARDIA   • Hydrocodone Nausea " "And Vomiting   • Penicillins Rash       Physical Examination:  /68 (BP Location: Right arm, Patient Position: Sitting, Cuff Size: Adult)   Pulse 69   Temp 98.6 °F (37 °C)   Ht 157.5 cm (62\")   Wt 68 kg (150 lb)   BMI 27.44 kg/m²     General Appearance:  Patient is in no distress.  She is well kept and has an  average  build.   Psychiatric:  Patient with appropriate mood and affect. Alert and oriented to self, time, and place.    Breast, RIGHT: large  sized, symmetric with the contralateral side.  Breast skin is without erythema, edema, rashes.  There is prominent hair centrally on each breast that is symmetric.  There are no visible abnormalities upon inspection during the arm-raising maneuver or with hands on hips in the sitting position. There is no nipple retraction, discharge or nipple/areolar skin changes.    There are no masses palpable in the sitting or supine positions. There is a well healed curvilinear incision in the LIQ from a past excision of a basal cell carcinoma.     Breast, LEFT:  large  sized, symmetric with the contralateral side.  Breast skin is without erythema, edema, rashes. There is prominent hair centrally on each breast that is symmetric.  There are no visible abnormalities upon inspection during the arm-raising maneuver or with hands on hips in the sitting position. There is no nipple retraction, discharge or nipple/areolar skin changes.There are no masses palpable in the sitting or supine positions.     Lymphatic:  There is no axillary, cervical, infraclavicular, or supraclavicular adenopathy bilaterally.  Eyes:  Pupils are round and reactive to light.  Cardiovascular:  Heart rate and rhythm are regular.  Respiratory:  Lungs are clear bilaterally with no crackles or wheezes in any lung field.  Gastrointestinal:  Abdomen is soft, nondistended, and nontender. There are no scars from previous surgery.   Musculoskeletal:  Good strength in all 4 extremities.  There is good range of " motion in both shoulders.   Skin:  No new skin lesions or rashes on the skin excluding the breast (see breast exam above).    Imagin-4-10 additional diagnostic views and a targetted RIGHT US [facility] to further evaluate the RIGHT breast nodule seen on mammo- showed the RIGHT lesion compressed  out on diagnostic views, suggesting that there is not a true mass on her screening mammogram.  On targetted US, there were clustered microcysts around the 12:00 location, at the site of her prior biopsy.  No solid lesions or macrocysts. BR2.    Breast MRI on 9-17-10  that showed, in comparison to the 08 MRI-- reidentification of a 10x8mm oval well circumscribed nodule RIGHT breast 9:00, unchanged or possibly smaller, with benign imaging features likely a LN or FA. Ther eis a second smaller but similarly enhancing nosdule at the 9:00 lcoation posterior and superior to the preexisting nodule, also has benign features. Rec correlation with targeted ultrasound.  LEFT no findings.  Ultrasound 10-11-10 of the RIGHT breast, that showed 2 separate solid lesions at the 9:00 location,  by about 5 mm, that correlate to 2 findings from her MRI from . THe first  stable on MRI, and 1.1 cm in size; the second new on the  MRI, 9mm in size. Both with brenign imaging features oval circumscribed, likely fibroadenomas, BR3, rec 6 month FU ultrasound. Cash.    Bilateral mammgoram and RIGHT US at [facility] from 11 showed 2 stable masses in the RIGHT breast at the 9:00 location, 5 CFN, imaging consistent with benign FA. Rec area reevaluated in 6months to ensure stability.  BR3.     11-10-11- Mrs Soha MRI showed   Two well circumscribed enhancing masses at the 9-00      location within the right breast, one of which shows interval increase      in size since the preceding MRI dated 2010. These correspond to      solid masses shown on breast ultrasound. Evaluation with biopsy of the      enlarging mass is  recommended. Biopsy of the immediately adjacent stable      mass may also be prudent since it would obviate the need for additional      imaging followup if the mass proved to be, as expected,      histopathologically benign. There are no findings suspicious for      malignancy within the left breast.    Her US 11-7-11 showed 2 nodules at 9:00. The first is stable on  2-21-11 at 1.1x1.0 cm. The second larger lesion has enlarged to a size of  1.6x1.3 cm up from 1.2 cm in 2-2011. Biopsy of the second lesion due to enlargment recommended.     04-27-12 Bilateral diagnostic mammogram [facility]  There are two metallic clips seen at the 9 o'clock position within the right breast biopsy-proven fibroadenoma. A metallic clip medial left breast as well as within the right breast at the 12 o'clock position.   Impression: There are no findings suspicious for malignancy in either breast.   BIRADS Category 2: Benign     11-12-12     Bilateral Breast MRI     [facility]    Kacey Willson  47 year-old female with right breast pain.  IMPRESSION:  There are no finding suspicious for malignancy in either breast.  No abnormality ot substantiate and/or explain the patient's right breast discomfort  Birads Category 1:  Negative    07-       [facility]    DIAGNOSTIC BILATERAL MAMMOGRAM                      KACEY WILLSON  HISTORY: Right lateral breast pain.  Moderately dense there is some mild skin retraction in the lateral aspect of the right breast.  IMPRESSION:  The ultrasound shows a hypoechoic nodule which may have represented a previously biopsied nodule and has relatively benign sonographic characteristics however short-term follow-up ultrasound of the right breast in approximately 3 months to 4 months recommended.  BIRADS 3    07-         [facility]             RIGHT BREAST ULTRASOUND                                KACEY WILLSON  9 o’clock to 10 o’clock right breast hypoechoic nodule measuring up to 1.6  cm x 1.1 cm x 5 mm. Patient had a nodule biopsied in this region on 11/14/2011 representing a fibroadenoma.  IMPRESSION:  Probably benign nodule in the 9 o’clock position of the right breast as described. Recommend follow-up right breast ultrasound 3 months to 4 months.      12-                [facility]     US                         RIGHT BREAST ULTRASOUND    PEEWEE WILLSON  HISTORY: Nonbloody right nipple discharge.  FINDINGS: Periareolar and retroareolar regions. 1 or 2 minimally prominent ducts are seen. No solid or cystic masses.    12-09-16                          DR MARION ZHU                   EXTERNAL RESULT SCAN               ROSA VIDES  Mammogram and breast ultrasound are normal. She was treated for mastitis.        May 18, 2017 -[facility]-bilateral breast MRI. Right breast 9:30 bowtie shaped metallic clip- not the ribbon shaped marker which is located more anterior and inferior and not the third marker that is more central and 12:00. from surrounding enhancement that measures 1.2 cm in greatest dimension. Recommend stereotactic guided biopsy of the clip and subsequent surrounding enhancement. No findings suspicious on the left.    5-24-17 [facility] RIGHT second look ultrasound- macrolobulated mass at 9:30 consistent with recurrent fibroadenoma (seen on MRI).  BI-RADS 3 recommend 6 month follow-up.    December 21, 2017 bilateral mammary with 3-D [facility]: Scattered fibroglandular densities.  BI-RADS 1.    Right breast ultrasound: [facility]: There is been no interval change in size of a lobulated nodule 9:30, 5 cm from the nipple.  There is a postbiopsy marker within it.  It measures 1.6 cm.  BI-RADS 3 recommend 6 month follow-up ultrasound.    [facility] breast MRI June 4, 2019: At the right breast 9:30, 5 cm from the nipple there is a metallic clip within a weakly enhancing mass measuring 1.6 cm consistent with fibroadenoma.   No findings suspicious in either breast BI-RADS 2.    [facility] June 5, 2018 right breast ultrasound: At 9:30, 5 cm from the nipple there is a 1.6 and meter lobulated mass slightly smaller than seen on the prior.  Clip is present.  Stable fibroadenoma.  BI-RADS 2.    12-19-19-[facility]  Screen 3d mammogram- scatt fg densities- BR1    5-7-20 [facility] mammogram screen with rochelle-  Scattered fg densities  BR1    Bilateral breast MRI [facility] December 23, 2019: No findings suspicious for malignancy in either breast BI-RADS 1      Pathology:  11-16-11- 2X RIGHT breast biopsies for breast masses enlarging -  9:00  FA and #2,  9:00, FCC and FA    Note from Dr. Bony Duarte dated January 30, 2019.  Patient is considering proceeding ahead with mastectomy reconstruction.  After consultation she is going to consider all of her options and we'll let him know if she wants to proceed.  Discussed subpectoral and prepectoral reconstruction.    Procedures:    Assessment:   Diagnosis Plan   1. BRCA2 genetic carrier     2. FH: breast cancer     3. FH: ovarian cancer     4. Fibrocystic breast disease (FCBD), unspecified laterality     5. Breast fibroadenoma, right        1-  3-21-17 [facility] BRCA1-2 analysis with cancer next- showed a BRCA2 mutation [id number].    2-3  Mother age 56, 1/5 MA age 73  PGM breast age 57, ovarian age uncertain    4-  1-2009- core biopsy [facility]- barbed or not shaped marker was left at the 12:00 location and the pathology returned as cystic apically metaplasia with dense stromal fibrosis and was felt concordant    11-16-11- 2X RIGHT breast biopsies for breast masses enlarging -  9:00  FA and #2,  9:00, FCC and FA- bowtie clip was posterior-superior in the ribbon clip was anterior inferior both at 9:00.        5-  RIGHT MRI-area of enhancement at 9:00 had increased T2 signal- second look ultrasound BR3 for recurring FA 5-2017- BR3 on US , due 6-2018- RIGHT US 6-2018- stable 1.6 cm mass BR2 c/w  FA        Plan:  Kacey is doing well today.  We reviewed her interval history, imaging, imaging reports together today as well as her exam.    There is no evidence of disease on her exam or imaging.    I saluted her on her continued weight loss.  Her mood and affect are in a good position currently.  She thinks this is a combination of the weight loss and the antidepressant and the current relationship in her life.    She is still in a place where she is not wanting to have mastectomy.    We previously discussed my recommendation for a bilateral risk reducing mastectomy- as the greatest level of risk reduction, of 90-95%.  She has spoken with both Dr. Stahl from gynecology and Dr. Suarez for medical oncology about her cancer risks.    SHe has seen Dr Calles and Dr Duarte in the past.    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  She knows that increased imaging surveillance does not equate with risk reduction.      Her next MRI will be due 12-21-20 [facility].  Her next bilateral mammogram at [facility] will be due 5-8-21     Her psychiatrist is Dr. Linda Tesfaye.     I asked her to continue her SBE and to call us in the interim with concerns and we'd be happy to see her back sooner.      Anna Duenas MD        We have spent15 minutes in visit today, 9 in face to face .      Next Appointment:  Return for Next scheduled follow up, after imaging.       EMR Dragon/transcription disclaimer:    Much of this encounter note is an electronic transcription/translocation of spoken language to printed text.  The electronic translation of spoken language may permit erroneous, or at times, nonsensical words or phrases to be inadvertently transcribed.  Although I have reviewed the note from such areas, some may still exist.

## 2020-06-12 ENCOUNTER — TELEPHONE (OUTPATIENT)
Dept: SURGERY | Facility: CLINIC | Age: 55
End: 2020-06-12

## 2020-06-12 ENCOUNTER — TRANSCRIBE ORDERS (OUTPATIENT)
Dept: SURGERY | Facility: CLINIC | Age: 55
End: 2020-06-12

## 2020-06-12 DIAGNOSIS — Z15.09 BRCA2 GENETIC CARRIER: Primary | ICD-10-CM

## 2020-06-12 DIAGNOSIS — Z15.01 BRCA2 GENETIC CARRIER: Primary | ICD-10-CM

## 2020-06-12 DIAGNOSIS — Z80.3 FAMILY HISTORY OF BREAST CANCER: ICD-10-CM

## 2020-06-12 NOTE — TELEPHONE ENCOUNTER
Scheduled Bilateral Breast MRI w wo contrast [facility] 12-21-20 arrive 1:00    Return to see  1-12-21 arrive 12:45    LM on patients phone.  Verónica

## 2020-06-18 ENCOUNTER — HOSPITAL ENCOUNTER (OUTPATIENT)
Dept: GENERAL RADIOLOGY | Facility: HOSPITAL | Age: 55
Discharge: HOME | End: 2020-06-18
Admitting: INTERNAL MEDICINE

## 2020-06-18 DIAGNOSIS — M54.50 LOW BACK PAIN, UNSPECIFIED BACK PAIN LATERALITY, UNSPECIFIED CHRONICITY, UNSPECIFIED WHETHER SCIATICA PRESENT: ICD-10-CM

## 2020-06-18 PROCEDURE — 72100 X-RAY EXAM L-S SPINE 2/3 VWS: CPT

## 2020-06-22 ENCOUNTER — TRANSCRIBE ORDERS (OUTPATIENT)
Dept: ADMINISTRATIVE | Facility: HOSPITAL | Age: 55
End: 2020-06-22

## 2020-06-22 DIAGNOSIS — G89.29 CHRONIC LOW BACK PAIN WITHOUT SCIATICA, UNSPECIFIED BACK PAIN LATERALITY: Primary | ICD-10-CM

## 2020-06-22 DIAGNOSIS — M54.50 CHRONIC LOW BACK PAIN WITHOUT SCIATICA, UNSPECIFIED BACK PAIN LATERALITY: Primary | ICD-10-CM

## 2020-06-30 ENCOUNTER — HOSPITAL ENCOUNTER (OUTPATIENT)
Dept: MRI IMAGING | Facility: HOSPITAL | Age: 55
Discharge: HOME | End: 2020-06-30
Admitting: INTERNAL MEDICINE

## 2020-06-30 DIAGNOSIS — G89.29 CHRONIC LOW BACK PAIN WITHOUT SCIATICA, UNSPECIFIED BACK PAIN LATERALITY: ICD-10-CM

## 2020-06-30 DIAGNOSIS — M54.50 CHRONIC LOW BACK PAIN WITHOUT SCIATICA, UNSPECIFIED BACK PAIN LATERALITY: ICD-10-CM

## 2020-06-30 PROCEDURE — 72148 MRI LUMBAR SPINE W/O DYE: CPT

## 2020-07-06 ENCOUNTER — APPOINTMENT (OUTPATIENT)
Dept: MRI IMAGING | Facility: HOSPITAL | Age: 55
End: 2020-07-06

## 2020-07-16 ENCOUNTER — TELEPHONE (OUTPATIENT)
Dept: NEUROSURGERY | Facility: CLINIC | Age: 55
End: 2020-07-16

## 2020-07-16 NOTE — TELEPHONE ENCOUNTER
Offered patient appt 7.20.20 and she declined, said she needs to push out a couple weeks at least    Appt made for 8.5.20 at 10:15 am    Divya, can you force her on the schedule please

## 2020-07-16 NOTE — TELEPHONE ENCOUNTER
----- Message from Valarie Escobar MA sent at 7/16/2020  8:26 AM EDT -----  Patient had a Left L5-S1 microdiscectomy from 10/22/13 by Dr. Bentley.    She was last seen on 12/13/13.    She had a L-spine MRI on 06/30/30. Can you review with Dr. VICKY cunha and let me know when she needs to be seen.

## 2020-08-05 ENCOUNTER — OFFICE VISIT (OUTPATIENT)
Dept: NEUROSURGERY | Facility: CLINIC | Age: 55
End: 2020-08-05

## 2020-08-05 VITALS
HEIGHT: 62 IN | BODY MASS INDEX: 27.43 KG/M2 | DIASTOLIC BLOOD PRESSURE: 74 MMHG | TEMPERATURE: 98.1 F | SYSTOLIC BLOOD PRESSURE: 110 MMHG | HEART RATE: 74 BPM

## 2020-08-05 DIAGNOSIS — M51.36 DDD (DEGENERATIVE DISC DISEASE), LUMBAR: Primary | ICD-10-CM

## 2020-08-05 DIAGNOSIS — M51.16 HERNIATION OF LUMBAR INTERVERTEBRAL DISC WITH RADICULOPATHY: ICD-10-CM

## 2020-08-05 PROCEDURE — 99244 OFF/OP CNSLTJ NEW/EST MOD 40: CPT | Performed by: NEUROLOGICAL SURGERY

## 2020-11-04 ENCOUNTER — TELEPHONE (OUTPATIENT)
Dept: SURGERY | Facility: CLINIC | Age: 55
End: 2020-11-04

## 2020-12-21 ENCOUNTER — HOSPITAL ENCOUNTER (OUTPATIENT)
Dept: MRI IMAGING | Facility: HOSPITAL | Age: 55
Discharge: HOME | End: 2020-12-21
Admitting: SURGERY

## 2020-12-21 DIAGNOSIS — Z15.01 BRCA2 GENETIC CARRIER: ICD-10-CM

## 2020-12-21 DIAGNOSIS — Z80.3 FAMILY HISTORY OF BREAST CANCER: ICD-10-CM

## 2020-12-21 DIAGNOSIS — Z15.09 BRCA2 GENETIC CARRIER: ICD-10-CM

## 2020-12-21 PROCEDURE — 0 GADOBENATE DIMEGLUMINE 529 MG/ML SOLUTION: Performed by: SURGERY

## 2020-12-21 PROCEDURE — 82565 ASSAY OF CREATININE: CPT

## 2020-12-21 PROCEDURE — 77049 MRI BREAST C-+ W/CAD BI: CPT

## 2020-12-21 PROCEDURE — A9577 INJ MULTIHANCE: HCPCS | Performed by: SURGERY

## 2020-12-21 RX ADMIN — GADOBENATE DIMEGLUMINE 14 ML: 529 INJECTION, SOLUTION INTRAVENOUS at 14:34

## 2020-12-22 LAB — CREAT BLDA-MCNC: 0.8 MG/DL (ref 0.6–1.3)

## 2021-02-08 ENCOUNTER — OFFICE VISIT (OUTPATIENT)
Dept: SURGERY | Facility: CLINIC | Age: 56
End: 2021-02-08

## 2021-02-08 VITALS
HEART RATE: 64 BPM | SYSTOLIC BLOOD PRESSURE: 103 MMHG | WEIGHT: 150 LBS | DIASTOLIC BLOOD PRESSURE: 70 MMHG | HEIGHT: 62 IN | TEMPERATURE: 97.8 F | BODY MASS INDEX: 27.6 KG/M2 | OXYGEN SATURATION: 98 %

## 2021-02-08 DIAGNOSIS — N60.12 FIBROCYSTIC BREAST CHANGES, BILATERAL: Primary | ICD-10-CM

## 2021-02-08 DIAGNOSIS — N60.11 FIBROCYSTIC BREAST CHANGES, BILATERAL: Primary | ICD-10-CM

## 2021-02-08 DIAGNOSIS — Z91.89 INCREASED RISK OF BREAST CANCER: ICD-10-CM

## 2021-02-08 DIAGNOSIS — Z80.3 FH: BREAST CANCER: ICD-10-CM

## 2021-02-08 DIAGNOSIS — Z15.09 BRCA2 GENE MUTATION POSITIVE: ICD-10-CM

## 2021-02-08 DIAGNOSIS — Z15.01 BRCA2 GENE MUTATION POSITIVE: ICD-10-CM

## 2021-02-08 DIAGNOSIS — Z80.41 FH: OVARIAN CANCER: ICD-10-CM

## 2021-02-08 PROCEDURE — 99213 OFFICE O/P EST LOW 20 MIN: CPT | Performed by: NURSE PRACTITIONER

## 2021-02-08 NOTE — PROGRESS NOTES
"Chief Complaint: Kacey Garcia is a 55 y.o. female who was seen in consultation at the request of Kayy Stahl MD  for BRCA2 genetic carrier, Abnormal MRI, Breast, Abnormal ultrasound, Breast, FH: Breast cancer, FH: Ovarian cancer, Fibroadenoma of breast, right    History of Present Illness:  Ms Garcia has a significant family history of breast cancer,Her family history includes the following: She has one paternal aunt and 5 maternal aunts.  One maternal aunt had breast cancer at age 73 and her mother had breast cancer age 56.  She has a paternal grandmother with breast and ovarian cancer.  Her breast cancer was diagnosed around age 57.  No other breast or ovarian cancer in her family.      She has had 2 breast biopsies in the past.   The first was in 2008, LEFT breast,  by Dr Russell at [facility] and benign;  and the most recent biopsy was performed 1/8/2009 at [facility] by Dr Christiano Castrejon, for a complex cystic lesion at the 12:00 RIGHT breast location.  The pathology from this returned as cystic apocrine metaplasia with increased fibrosis.     She had her annual mammogram 2/2/10 at [facility] that showed in the RIGHT breast, just lateral to the marker from the most recent biopsy, a circumscribed nodule 7mm in diameter, new compared to prior- read as BR0.      She has noted no masses in either breast, no skin changes, no nipple changes.  She does note some \"knottiness\" in the RIGHT UOQ that fluctuates in consistency and size.  SHe had additional imaging the day of her intiial visit that showed on the mammogram that the RIGHT mammo lesion compressed out, and on ultraosund, clustered microcysts at the 12:00 location.     Mrs. Garcia underwent a breast MRI on 9-17-10  that showed, in comparison to the 1-18-08 MRI-- reidentification of a 10x8mm oval well circumscribed nodule RIGHT breast 9:00, unchanged or possibly smaller, with benign imaging features likely a LN or FA. Ther eis a second smaller but similarly enhancing " nosdule at the 9:00 lcoation posterior and superior to the preexisting nodule, also has benign features. Rec correlation with targeted ultrasound.  LEFT no findings.  She then had an ultraosund today of the RIGHT breast, that showed 2 aeparate solid lesions at the 9:00 location,  by about 5 mm, that correlate to 2 findings from her MRI from 9-2010. THe first  stable on MRI, and 1.1 cm in size; the second new on the 9-2010 MRI, 9mm in size. Both with brenign imaging features oval circumscribed, likely fibroadenomas, BR3, rec 6 month FU ultrasound. Cash.  She has noted no new masses, skin changes, nipple changes, nipple discharge either breast.    Since our last visit, Mrs. Garcia has done well. She reports no new masses, skin changes, nipple changes, nipple discahrge from either breast. SHe does report some tenderness RIGHT breast laterally when she has stress. Her bilateral mammgoram and RIGHT US at [facility] from 2-21-11 showed 2 stable masses in the RIGHT breast at the 9:00 location, 5 CFN, imaging consistent with benign FA. Rec area reevaluated in 6months to ensure stability.  BR3.   Also rec continued MRI surveillance.    Since our last visit, Kacey had her imaging done, as below.  She had 2 RIGHT breast biopsies as below. Both returned as fibroadenomas. Other than tenderness related to the biopsy sites, she has noted no other changes in her exam- no masses, skin changes, nipple changes, nipple discharge.   She has gotten  since our last visit. SHe has had a 10 # weight gain related to stress.   06-11-51Tuzhv Breast Ultrasound [facility] Kacey Garcia  Followup right breast nodule  Impression: Stable 10 mm to 11 mm nodule in the 9 o'clock position of the right breast 5 cm from the nipple. The second larger nodule has increased in size from the 02/21/2011 study and biopsy of this nodule is recommended.    11-10-11- Mrs Hoyos MRI showed   Two well circumscribed enhancing masses at the 9-00      location  within the right breast, one of which shows interval increase      in size since the preceding MRI dated 09/17/2010. These correspond to      solid masses shown on breast ultrasound. Evaluation with biopsy of the      enlarging mass is recommended. Biopsy of the immediately adjacent stable      mass may also be prudent since it would obviate the need for additional      imaging followup if the mass proved to be, as expected,      histopathologically benign. There are no findings suspicious for      malignancy within the left breast.    Her US 11-7-11 showed 2 nodules at 9:00. The first is stable on  2-21-11 at 1.1x1.0 cm. The second larger lesion has enlarged to a size of  1.6x1.3 cm up from 1.2 cm in 2-2011. Biopsy of the second lesion due to enlargment recommended.     11-16-11- Her pathology returned as 9:00  FA and #2,  9:00, [facility] and FA.      Since our last visit, Kacey has done well. She has adjusted to being  and feels good. She has gained weight, but she thinks this is related to the stress of 2 jobs. She has noted no change sin her breast exam. No new masses, skin cahgnes, nipple changes, nipple discahrge eithe rbreast. Her most recent imagingi s from today 4-27-12 bilateral DX mammogram BR2 for post biopsy changes with 2 clips RIGHT. BR2.      Since our last visit, Mrs. Garcia has done well. She has noted no new masses, skin changes, nipple changes, nipple discharge either breast.   Her most recent imaging is a bilateral MRI [facility] 11-12-12 BR1 negative.  Her review of systems is unchanged other than  the above since 4-27-12.  I have reviewed the patient's Past Medical History, Family History, Social History, medications and allergies and confirm that the information is up to date and accurate.      Kacey has not seen us since December 2012.  She reports that in late November 2016, that she waxed her nipples 1 evening.  That night she had intercourse, and awoke the next morning with redness and warmth  of the right breast and a small amount of drainage that had spotted on her T-shirt.  She has noted no additional nipple drainage.    She was treated with Keflex and her symptoms resolved.    She feels now like when she gets out of the shower the right nipple may be a little bit darker than the left.      She noted no other masses, skin changes, prior to her most recent imaging.   Her most recent imaging includes a bilateral mammogram with 3-D December 14, 2016 at [facility] that was BI-RADS 2.  December 9, 2016 a right breast ultrasound also at [facility] for right nipple discharge, showed periareolar 1-2 minimally prominent ducts, benign.    She has had 2 right breast biopsies in the past that returned as fibroadenomata and fibrocystic change    She has her uterus and ovaries, is perimenopausal, and takes no hormones.        In the interim,  Kacey Garcia  has done well.  She has noted no changes in her breast exam. No new masses, skin changes, nipple changes, nipple discharge either breast.     Upon our advice, Kacey saw genetics and had [facility] genetics testing 3-21-17 of BRCA1-2 analysis with cancer next- showed a BRCA2 mutation [id number].     I arranged for her to have a breast MRI.  [facility] May 18, 2017 bilateral breast MRI. Right breast 9:30 bowtie shaped metallic clip- not the ribbon shaped marker which is located more anterior and inferior and not the third marker that is more central and 12:00. from surrounding enhancement that measures 1.2 cm in greatest dimension. Recommend stereotactic guided biopsy of the clip and subsequent surrounding enhancement. No findings suspicious on the left.     I reviewed the procedure notes from January 2009 in November 2011. In 2009 a barbed or knot shaped marker was left at the 12:00 location and the pathology returned as cystic apically metaplasia with dense stromal fibrosis and was felt concordant. In November 2011 a right breast biopsy  of 2 masses at 9:00 were done and both masses returned as fibroadenoma. The bowtie clip was posterior-superior in the ribbon clip was anterior inferior both at 9:00. I discussed the MRI results with Dr. Castrejon today. He felt that a second look ultrasound may be the next best step since the area of enhancement at 9:00 had increased T2 signal and this is favorable and he is previously sampled the masses. Therefore we arranged for her had a right breast Second Look ultrasound.    This was done yesterday May 24, 2017 and showed macrolobulated hypoechoic mass with an internal metallic clip 9:30 right breast 5 cm from the nipple.  Most consistent with fibroadenoma.  Consistent with a recurrent fibroadenoma previously biopsied.  6 month follow-up is recommended.  BI-RADS 3.    Her most recent mammogram was December 14, 2016 no radiographic evidence of malignancy.  BI-RADS 2.    She has had an intentional 22-1/2 pound weight loss.    In the interim, Kacey has talked with Dr. Hall about reconstruction and they have decided to proceed with expander reconstruction at the time of her surgery.  She has also discussed oophorectomy with Dr. Stahl who has deferred the timing to Kacey's preference.    She denies any changes in her exam.      In the interim,  Kacey Garcia  has done well.  She has noted no changes in her breast exam. No new masses, skin changes, nipple changes, nipple discharge either breast.   She denies headache, bone pain, belly pain, cough, changes in vision or gait.  Her most recent imaging includes the following:  December 21, 2017 bilateral mammary with 3-D [facility]: Scattered fibroglandular densities.  BI-RADS 1.     Right breast ultrasound: [facility]: There is been no interval change in size of a lobulated nodule 9:30, 5 cm from the nipple.  There is a postbiopsy marker within it.  It measures 1.6 cm.  BI-RADS 3 recommend 6 month follow-up ultrasound.    She underwent  2017 a lap cholecystectomy. Dr. Fritz Daigle.  She tells me that she was having intermittent abdominal pain for which she had no explanation, and is now feeling much better.  She saw her psychiatrist who recommended medication but she elected not to start this.  She tells me from a psychological standpoint she is doing better since the resolution of her pain.      In the interim,  Kacey Garcia  has done well.  She has noted no changes in her breast exam. No new masses, skin changes, nipple changes, nipple discharge either breast.   She denies headache, bone pain, belly pain, cough, changes in vision or gait.    Her most recent imaging includes the following:  [facility] breast MRI 2019: At the right breast 9:30, 5 cm from the nipple there is a metallic clip within a weakly enhancing mass measuring 1.6 cm consistent with fibroadenoma.  No findings suspicious in either breast BI-RADS 2.  [facility] 2018 right breast ultrasound: At 9:30, 5 cm from the nipple there is a 1.6 and meter lobulated mass slightly smaller than seen on the prior.  Clip is present.  Stable fibroadenoma.  BI-RADS 2.    Her weight is stable.  SHe has not seen her gynecologist for interval surveillance.      In the interim,  Kacey Garcia  has done well.  She is seeing Dr. Kayy Stahl and they have discussed removal of her ovaries and transvaginal ultrasound.  She is seeing Dr. Suarez for medical oncology and they have discussed risk reducing medications.  She comes today for follow-up.  She has noted no changes in her breast exam. No new masses, skin changes, nipple changes, nipple discharge either breast.   She denies headache, bone pain, belly pain, cough, changes in vision or gait.  Her most recent imaging includes the followin19-   Screen 3d mammogram- scatt fg densities- BR1    She was last seen in clinic by Dr Duenas in 2020.  In the interim,  Kacey Garcia  has done  well.  She has noted no changes in her breast exam. No new masses, skin changes, nipple changes, nipple discharge either breast.   She denies headache, bone pain, belly pain, cough, changes in vision or gait.    Her most recent imaging includes the followin20 [facility] mammogram screen with rochelle-Scattered fg densities  BR1    SHe has lost 11 pounds, intentional.  She has started on an antidepressant after the COVID anxiety pushed her to her limit, and this has made her feel better.  SHe has started dating in September.    She is here fore review.    Interval History:  She returns today for follow up with no breast concerns or health changes.  She continues to diet and exercise when she can.  Breast MRI was completed in  with BiRads 2 results.        Review of Systems:  Review of Systems   Constitutional: Positive for unexpected weight change (11lb weight loss).   Cardiovascular: Positive for palpitations.   Psychiatric/Behavioral: Positive for sleep disturbance.   All other systems reviewed and are negative.       Past Medical and Surgical History:  Breast Biopsy History:  Patient has had the following breast biopsies:2 breast biopsies in the past- one RIGHT and one LEFT sided.  Both benign pathology per patient report.   Breast Cancer HIstory:  Patient does not have a past medical history of breast cancer.  Breast Operations, and year:  NONE   Obstetric/Gynecologic History:  Age menstrual periods began:13  Patient is premenopausal, first day of last period: 2017  Number of pregnancies:2  Number of live births: 1  Number of abortions or miscarriages: 1  Age of delivery of first child: 35  Patient breast fed, for the following lenth of time: 3 WEEKS   Length of time taking birth control pills: 1 MONTH   Patient has never taken hormone replacement  The patient still has her uterus and ovaries.      Past Surgical History:   Procedure Laterality Date   • BASAL CELL CARCINOMA EXCISION Right     Face. RIGHT  BREAST   • BREAST BIOPSY Left     Benign   • BREAST BIOPSY Right 2009    Complex cystic lesion   •  SECTION     • CHOLECYSTECTOMY WITH INTRAOPERATIVE CHOLANGIOGRAM N/A 2017    Procedure: CHOLECYSTECTOMY LAPAROSCOPIC INTRAOPERATIVE CHOLANGIOGRAM;  Surgeon: Tesfaye Daigle Jr., MD;  Location: [facility];  Service:    • DILATATION AND CURETTAGE     • LUMBAR DISC SURGERY     • SINUS SURGERY     • WISDOM TOOTH EXTRACTION       Past Medical History:   Diagnosis Date   • Anxiety    • Asthma    • Basal cell carcinoma     RIGHT FACE, LEFT BREAST   • BRCA gene positive     BRCA2   • Chronic sinusitis    • Constipation     WITH RECTAL BLEEDING PER DR TROY'S OFFICE NOTE   • DDD (degenerative disc disease), cervical    • Fibrocystic breast    • GERD (gastroesophageal reflux disease)    • Hashimoto's thyroiditis    • History of anemia    • History of migraine    • History of prior pregnancies     x2   • Irregular heart beat     TACHYCARDIA, SAW DR. KELLEY IN .  NO FOLLOW UP REQUIRED   • PONV (postoperative nausea and vomiting)    • S/P skin biopsy     R EAR, R BREAST, LOWER LEFT LEG, L THIGN   • Spinal headache    • Thyroid nodule     WITH GOITER   • TMJ (dislocation of temporomandibular joint)    • Varicose vein of leg    • Vitamin D deficiency        Prior Hospitalizations, other than for surgery or childbirth, and year:  NONE     Social History     Socioeconomic History   • Marital status:      Spouse name: Not on file   • Number of children: 1   • Years of education: College   • Highest education level: Not on file   Occupational History   • Occupation: ; Therapist, psychiatric     Employer: [employer]   Tobacco Use   • Smoking status: Never Smoker   • Smokeless tobacco: Never Used   Substance and Sexual Activity   • Alcohol use: No   • Drug use: No     Patient is .  Patient is employed full time with the following occupation:    Patient drinks  "1 servings of caffeine per day.    Family History:  Family History   Problem Relation Age of Onset   • Cancer Mother         breast X 2 AGE 56-58   • Hypothyroidism Mother    • Heart defect Mother    • Depression Mother    • Heart failure Father    • Asthma Father    • Breast cancer Paternal Grandmother 45   • Ovarian cancer Paternal Grandmother    • Breast cancer Maternal Aunt 73   • Pancreatic cancer Maternal Uncle    • Prostate cancer Maternal Uncle    • Kidney cancer Maternal Uncle    • Hypothyroidism Other    • COPD Other    • Asthma Other    • Diabetes Other    • Asthma Sister         Rheumatoid   • Diabetes Maternal Grandmother    • Cancer Maternal Aunt         Bladder   • Malig Hyperthermia Neg Hx        Vital Signs:  /70 (BP Location: Right arm)   Pulse 64   Temp 97.8 °F (36.6 °C)   Ht 157.5 cm (62\")   Wt 68 kg (150 lb)   SpO2 98%   BMI 27.44 kg/m²      Medications:    Current Outpatient Medications:   •  Vortioxetine HBr (TRINTELLIX PO), Take  by mouth., Disp: , Rfl:   •  acetaminophen (TYLENOL) 325 MG tablet, Take 650 mg by mouth Every 6 (Six) Hours As Needed for Mild Pain ., Disp: , Rfl:   •  ALPRAZolam (XANAX) 1 MG tablet, Take 0.25 mg by mouth 2 (Two) Times a Day As Needed for Anxiety or Sleep., Disp: , Rfl:   •  cyclobenzaprine (FLEXERIL) 5 MG tablet, Take 1 tablet by mouth 3 (Three) Times a Day As Needed for Muscle Spasms., Disp: 15 tablet, Rfl: 0     Allergies:  Allergies   Allergen Reactions   • Codeine Other (See Comments)     Causes severe stomach pains   • Levaquin [Levofloxacin In D5w] Other (See Comments)     Pins and needles, weakness, nausea, affects bloodwork results   • Tequin [Gatifloxacin] Other (See Comments)     Pins and needles, weakness, nausea, affects bloodwork   • Bupropion Other (See Comments)     INCREASED BLOOD PRESSURE   • Citalopram Mental Status Change     TACHYCARDIA   • Hydrocodone Nausea And Vomiting   • Penicillins Rash       Physical Examination:  /70 " "(BP Location: Right arm)   Pulse 64   Temp 97.8 °F (36.6 °C)   Ht 157.5 cm (62\")   Wt 68 kg (150 lb)   SpO2 98%   BMI 27.44 kg/m²     General Appearance:  Patient is in no distress.  She is well kept and has an  average  build.   Psychiatric:  Patient with appropriate mood and affect. Alert and oriented to self, time, and place.    Breast, RIGHT: large  sized, symmetric with the contralateral side.  Breast skin is without erythema, edema, rashes.  There is prominent hair centrally on each breast that is symmetric.  There are no visible abnormalities upon inspection during the arm-raising maneuver or with hands on hips in the sitting position. There is no nipple retraction, discharge or nipple/areolar skin changes.    There are no masses palpable in the sitting or supine positions. There is a well healed curvilinear incision in the LIQ from a past excision of a basal cell carcinoma.     Breast, LEFT:  large  sized, symmetric with the contralateral side.  Breast skin is without erythema, edema, rashes. There is prominent hair centrally on each breast that is symmetric.  There are no visible abnormalities upon inspection during the arm-raising maneuver or with hands on hips in the sitting position. There is no nipple retraction, discharge or nipple/areolar skin changes.There are no masses palpable in the sitting or supine positions.     Lymphatic:  There is no axillary, cervical, infraclavicular, or supraclavicular adenopathy bilaterally.  Eyes:  Pupils are round and reactive to light.  Cardiovascular:  Heart rate and rhythm are regular.  Respiratory:  Lungs are clear bilaterally with no crackles or wheezes in any lung field.  Gastrointestinal:  Abdomen is soft, nondistended, and nontender. There are no scars from previous surgery.   Musculoskeletal:  Good strength in all 4 extremities.  There is good range of motion in both shoulders.   Skin:  No new skin lesions or rashes on the skin excluding the breast (see " breast exam above).    Imagin2020:  Breast MRI at [facility]   FINDINGS: There is scattered fibroglandular tissue. There is mild background parenchymal enhancement.     RIGHT BREAST:    There are foci of susceptibility from biopsy clips at 12:00, 9:00, and 10:00 in the right breast. No suspicious enhancing mass or area of non-mass enhancement is identified.  The visualized axilla is within normal limits.      LEFT BREAST:    There is a focus of susceptibility from a biopsy clip at 7:00 in the left breast. No suspicious enhancing mass or area of non-mass enhancement is identified.  The visualized axilla is within normal limits.      EXTRAMAMMARY FINDINGS:   There are no abnormally enlarged internal mammary chain lymph nodes on either side.        IMPRESSION AND RECOMMENDATION: No MRI evidence of malignancy in either breast. Recommend annual bilateral screening mammogram in May 2021.  Recommend screening breast MRI in one year.     BI-RADS Category 2: Benign         Pathology:  11-16-11- 2X RIGHT breast biopsies for breast masses enlarging -  9:00  FA and #2,  9:00, FCC and FA    Note from Dr. Bony Duarte dated 2019.  Patient is considering proceeding ahead with mastectomy reconstruction.  After consultation she is going to consider all of her options and we'll let him know if she wants to proceed.  Discussed subpectoral and prepectoral reconstruction.    Procedures:    Assessment:   Diagnosis Plan   1. Fibrocystic breast changes, bilateral     2. FH: breast cancer     3. FH: ovarian cancer     4. BRCA2 gene mutation positive     5. Increased risk of breast cancer            Plan:  Kacey is doing well today.  We reviewed her interval history, imaging, imaging reports together today as well as her exam.    There is no evidence of disease on her exam or imaging.    I saluted her on her continued weight loss.  Her mood and affect are in a good position currently.  She thinks this is a combination of  the weight loss and the antidepressant and the current relationship in her life.    She is still in a place where she is not wanting to have mastectomy.    Dr Duenas previously discussed her recommendation for a bilateral risk reducing mastectomy- as the greatest level of risk reduction, of 90-95%.  She has spoken with both Dr. Stahl from gynecology and Dr. Suarez for medical oncology about her cancer risks.    SHe has seen Dr Calles and Dr Duarte in the past.    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  She knows that increased imaging surveillance does not equate with risk reduction.      Her next bilateral mammogram with tomosynthesis at [facility] will be due 5-8-21 with clinic visit to follow.     Her psychiatrist is Dr. Linda Tesfaye.     I asked her to continue her SBE and to call us in the interim with concerns and we'd be happy to see her back sooner.      Mikayla Knight, MELISSA        We have spent 15 minutes in visit today, 9 in face to face .      Next Appointment:  No follow-ups on file.       EMR Dragon/transcription disclaimer:    Much of this encounter note is an electronic transcription/translocation of spoken language to printed text.  The electronic translation of spoken language may permit erroneous, or at times, nonsensical words or phrases to be inadvertently transcribed.  Although I have reviewed the note from such areas, some may still exist.

## 2021-02-09 ENCOUNTER — TELEPHONE (OUTPATIENT)
Dept: SURGERY | Facility: CLINIC | Age: 56
End: 2021-02-09

## 2021-02-09 NOTE — TELEPHONE ENCOUNTER
Scheduled Bilateral 3D Screen Mammo at [facility] 5-27-21 at 1:30  Return to see Mikayla Knight NP 6-7-21 arrive 1:45    marco

## 2021-06-01 ENCOUNTER — TELEPHONE (OUTPATIENT)
Dept: SURGERY | Facility: CLINIC | Age: 56
End: 2021-06-01

## 2021-06-01 NOTE — TELEPHONE ENCOUNTER
DEVI for call back. She missed her [facility] appt on 5/27/21. We will need to r/s that and her appt with Mikayla.

## 2021-06-02 ENCOUNTER — TELEPHONE (OUTPATIENT)
Dept: SURGERY | Facility: CLINIC | Age: 56
End: 2021-06-02

## 2021-06-02 NOTE — TELEPHONE ENCOUNTER
Attempted to call patient regarding missed MGM and to r/s this and her appt with Mikayla. The mailbox was full so I could not leave a message.

## 2021-06-03 ENCOUNTER — TELEPHONE (OUTPATIENT)
Dept: SURGERY | Facility: CLINIC | Age: 56
End: 2021-06-03

## 2021-06-03 NOTE — TELEPHONE ENCOUNTER
Patient called back is aware of these appointments.     R/S Screening MGM at [facility] 9/13/2021 @ 1:00. Appt with Mikayla has been r/s to 11/16/21 @ 11:45.   Mailbox was full to inform patient. She did mention she would check her MyChart.

## 2021-11-08 ENCOUNTER — TELEPHONE (OUTPATIENT)
Dept: SURGERY | Facility: CLINIC | Age: 56
End: 2021-11-08

## 2021-11-16 ENCOUNTER — OFFICE VISIT (OUTPATIENT)
Dept: SURGERY | Facility: CLINIC | Age: 56
End: 2021-11-16

## 2021-11-16 ENCOUNTER — TELEPHONE (OUTPATIENT)
Dept: SURGERY | Facility: CLINIC | Age: 56
End: 2021-11-16

## 2021-11-16 VITALS
BODY MASS INDEX: 30.36 KG/M2 | DIASTOLIC BLOOD PRESSURE: 76 MMHG | WEIGHT: 165 LBS | HEART RATE: 82 BPM | HEIGHT: 62 IN | SYSTOLIC BLOOD PRESSURE: 106 MMHG

## 2021-11-16 DIAGNOSIS — Z15.09 BRCA2 GENE MUTATION POSITIVE: ICD-10-CM

## 2021-11-16 DIAGNOSIS — N60.21 FIBROADENOSIS OF RIGHT BREAST: Primary | ICD-10-CM

## 2021-11-16 DIAGNOSIS — Z15.01 BRCA2 GENE MUTATION POSITIVE: ICD-10-CM

## 2021-11-16 DIAGNOSIS — Z80.41 FH: OVARIAN CANCER: ICD-10-CM

## 2021-11-16 DIAGNOSIS — Z80.3 FH: BREAST CANCER: ICD-10-CM

## 2021-11-16 DIAGNOSIS — N60.11 FIBROCYSTIC BREAST CHANGES, BILATERAL: ICD-10-CM

## 2021-11-16 DIAGNOSIS — Z91.89 INCREASED RISK OF BREAST CANCER: ICD-10-CM

## 2021-11-16 DIAGNOSIS — N60.12 FIBROCYSTIC BREAST CHANGES, BILATERAL: ICD-10-CM

## 2021-11-16 PROCEDURE — 99213 OFFICE O/P EST LOW 20 MIN: CPT | Performed by: NURSE PRACTITIONER

## 2021-11-16 NOTE — PROGRESS NOTES
"Chief Complaint: Kacey Garcia is a 56 y.o. female who was seen in consultation at the request of Kayy Stahl MD  for BRCA2 genetic carrier, Abnormal MRI, Breast, Abnormal ultrasound, Breast, FH: Breast cancer, FH: Ovarian cancer, Fibroadenoma of breast, right    History of Present Illness:  2010 : seen by Dr Duenas  Ms Garcia has a significant family history of breast cancer,Her family history includes the following: She has one paternal aunt and 5 maternal aunts.  One maternal aunt had breast cancer at age 73 and her mother had breast cancer age 56.  She has a paternal grandmother with breast and ovarian cancer.  Her breast cancer was diagnosed around age 57.  No other breast or ovarian cancer in her family.      She has had 2 breast biopsies in the past.   The first was in 2008, LEFT breast,  by Dr Russell at [facility] and benign;  and the most recent biopsy was performed 1/8/2009 at [facility] by Dr Christiano Castrejon, for a complex cystic lesion at the 12:00 RIGHT breast location.  The pathology from this returned as cystic apocrine metaplasia with increased fibrosis.     She had her annual mammogram 2/2/10 at [facility] that showed in the RIGHT breast, just lateral to the marker from the most recent biopsy, a circumscribed nodule 7mm in diameter, new compared to prior- read as BR0.      She has noted no masses in either breast, no skin changes, no nipple changes.  She does note some \"knottiness\" in the RIGHT UOQ that fluctuates in consistency and size.  SHe had additional imaging the day of her intiial visit that showed on the mammogram that the RIGHT mammo lesion compressed out, and on ultraosund, clustered microcysts at the 12:00 location.     Mrs. Garcia underwent a breast MRI on 9-17-10  that showed, in comparison to the 1-18-08 MRI-- reidentification of a 10x8mm oval well circumscribed nodule RIGHT breast 9:00, unchanged or possibly smaller, with benign imaging features likely a LN or FA. Ther eis a second smaller " but similarly enhancing nosdule at the 9:00 lcoation posterior and superior to the preexisting nodule, also has benign features. Rec correlation with targeted ultrasound.  LEFT no findings.  She then had an ultraosund today of the RIGHT breast, that showed 2 aeparate solid lesions at the 9:00 location,  by about 5 mm, that correlate to 2 findings from her MRI from 9-2010. THe first  stable on MRI, and 1.1 cm in size; the second new on the 9-2010 MRI, 9mm in size. Both with brenign imaging features oval circumscribed, likely fibroadenomas, BR3, rec 6 month FU ultrasound. Cash.  She has noted no new masses, skin changes, nipple changes, nipple discharge either breast.    Since our last visit, Mrs. Garcia has done well. She reports no new masses, skin changes, nipple changes, nipple discahrge from either breast. SHe does report some tenderness RIGHT breast laterally when she has stress. Her bilateral mammgoram and RIGHT US at [facility] from 2-21-11 showed 2 stable masses in the RIGHT breast at the 9:00 location, 5 CFN, imaging consistent with benign FA. Rec area reevaluated in 6months to ensure stability.  BR3.   Also rec continued MRI surveillance.    Since our last visit, Kacey had her imaging done, as below.  She had 2 RIGHT breast biopsies as below. Both returned as fibroadenomas. Other than tenderness related to the biopsy sites, she has noted no other changes in her exam- no masses, skin changes, nipple changes, nipple discharge.   She has gotten  since our last visit. SHe has had a 10 # weight gain related to stress.   [id number] Breast Ultrasound [facility] Kacey Garcia  Followup right breast nodule  Impression: Stable 10 mm to 11 mm nodule in the 9 o'clock position of the right breast 5 cm from the nipple. The second larger nodule has increased in size from the 02/21/2011 study and biopsy of this nodule is recommended.    11-10-11- Mrs Hoyos MRI showed   Two well circumscribed enhancing masses  at the 9-00      location within the right breast, one of which shows interval increase      in size since the preceding MRI dated 09/17/2010. These correspond to      solid masses shown on breast ultrasound. Evaluation with biopsy of the      enlarging mass is recommended. Biopsy of the immediately adjacent stable      mass may also be prudent since it would obviate the need for additional      imaging followup if the mass proved to be, as expected,      histopathologically benign. There are no findings suspicious for      malignancy within the left breast.    Her US 11-7-11 showed 2 nodules at 9:00. The first is stable on  2-21-11 at 1.1x1.0 cm. The second larger lesion has enlarged to a size of  1.6x1.3 cm up from 1.2 cm in 2-2011. Biopsy of the second lesion due to enlargment recommended.     11-16-11- Her pathology returned as 9:00  FA and #2,  9:00, [facility] and FA.      Since our last visit, Kacey has done well. She has adjusted to being  and feels good. She has gained weight, but she thinks this is related to the stress of 2 jobs. She has noted no change sin her breast exam. No new masses, skin cahgnes, nipple changes, nipple discahrge eithe rbreast. Her most recent imagingi s from today 4-27-12 bilateral DX mammogram BR2 for post biopsy changes with 2 clips RIGHT. BR2.      Since our last visit, Mrs. Garcia has done well. She has noted no new masses, skin changes, nipple changes, nipple discharge either breast.   Her most recent imaging is a bilateral MRI [facility] 11-12-12 BR1 negative.  Her review of systems is unchanged other than  the above since 4-27-12.  I have reviewed the patient's Past Medical History, Family History, Social History, medications and allergies and confirm that the information is up to date and accurate.      Kacey has not seen us since December 2012.  She reports that in late November 2016, that she waxed her nipples 1 evening.  That night she had intercourse, and awoke the next  morning with redness and warmth of the right breast and a small amount of drainage that had spotted on her T-shirt.  She has noted no additional nipple drainage.    She was treated with Keflex and her symptoms resolved.    She feels now like when she gets out of the shower the right nipple may be a little bit darker than the left.      She noted no other masses, skin changes, prior to her most recent imaging.   Her most recent imaging includes a bilateral mammogram with 3-D December 14, 2016 at [facility] that was BI-RADS 2.  December 9, 2016 a right breast ultrasound also at [facility] for right nipple discharge, showed periareolar 1-2 minimally prominent ducts, benign.    She has had 2 right breast biopsies in the past that returned as fibroadenomata and fibrocystic change    She has her uterus and ovaries, is perimenopausal, and takes no hormones.        In the interim,  Kacey Garcia  has done well.  She has noted no changes in her breast exam. No new masses, skin changes, nipple changes, nipple discharge either breast.     Upon our advice, Kacey saw genetics and had Done. genetics testing 3-21-17 of BRCA1-2 analysis with cancer next- showed a BRCA2 mutation [id number].     I arranged for her to have a breast MRI.  [facility] May 18, 2017 bilateral breast MRI. Right breast 9:30 bowtie shaped metallic clip- not the ribbon shaped marker which is located more anterior and inferior and not the third marker that is more central and 12:00. from surrounding enhancement that measures 1.2 cm in greatest dimension. Recommend stereotactic guided biopsy of the clip and subsequent surrounding enhancement. No findings suspicious on the left.     I reviewed the procedure notes from January 2009 in November 2011. In 2009 a barbed or knot shaped marker was left at the 12:00 location and the pathology returned as cystic apically metaplasia with dense stromal fibrosis and was felt concordant. In  November 2011 a right breast biopsy of 2 masses at 9:00 were done and both masses returned as fibroadenoma. The bowtie clip was posterior-superior in the ribbon clip was anterior inferior both at 9:00. I discussed the MRI results with Dr. Castrejon today. He felt that a second look ultrasound may be the next best step since the area of enhancement at 9:00 had increased T2 signal and this is favorable and he is previously sampled the masses. Therefore we arranged for her had a right breast Second Look ultrasound.    This was done yesterday May 24, 2017 and showed macrolobulated hypoechoic mass with an internal metallic clip 9:30 right breast 5 cm from the nipple.  Most consistent with fibroadenoma.  Consistent with a recurrent fibroadenoma previously biopsied.  6 month follow-up is recommended.  BI-RADS 3.    Her most recent mammogram was December 14, 2016 no radiographic evidence of malignancy.  BI-RADS 2.    She has had an intentional 22-1/2 pound weight loss.  6/20/2017:  In the interim, Kacey has talked with Dr. Hall about reconstruction and they have decided to proceed with expander reconstruction at the time of her surgery.  She has also discussed oophorectomy with Dr. Stahl who has deferred the timing to Kacey's preference.    She denies any changes in her exam.    1/3/2018:  In the interim,  Kacey Garcia  has done well.  She has noted no changes in her breast exam. No new masses, skin changes, nipple changes, nipple discharge either breast.   She denies headache, bone pain, belly pain, cough, changes in vision or gait.  Her most recent imaging includes the following:  December 21, 2017 bilateral mammary with 3-D [facility]: Scattered fibroglandular densities.  BI-RADS 1.     Right breast ultrasound: [facility]: There is been no interval change in size of a lobulated nodule 9:30, 5 cm from the nipple.  There is a postbiopsy marker within it.  It measures 1.6 cm.  BI-RADS 3  recommend 6 month follow-up ultrasound.    She underwent 2017 a lap cholecystectomy. Dr. Fritz Daigle.  She tells me that she was having intermittent abdominal pain for which she had no explanation, and is now feeling much better.  She saw her psychiatrist who recommended medication but she elected not to start this.  She tells me from a psychological standpoint she is doing better since the resolution of her pain.    2018:  In the interim,  Kacey Garcia  has done well.  She has noted no changes in her breast exam. No new masses, skin changes, nipple changes, nipple discharge either breast.   She denies headache, bone pain, belly pain, cough, changes in vision or gait.    Her most recent imaging includes the following:  [facility] breast MRI 2019: At the right breast 9:30, 5 cm from the nipple there is a metallic clip within a weakly enhancing mass measuring 1.6 cm consistent with fibroadenoma.  No findings suspicious in either breast BI-RADS 2.  [facility] 2018 right breast ultrasound: At 9:30, 5 cm from the nipple there is a 1.6 and meter lobulated mass slightly smaller than seen on the prior.  Clip is present.  Stable fibroadenoma.  BI-RADS 2.    Her weight is stable.  SHe has not seen her gynecologist for interval surveillance.      In the interim,  Kacey Garcia  has done well.  She is seeing Dr. Kayy Stahl and they have discussed removal of her ovaries and transvaginal ultrasound.  She is seeing Dr. Suarez for medical oncology and they have discussed risk reducing medications.  She comes today for follow-up.  She has noted no changes in her breast exam. No new masses, skin changes, nipple changes, nipple discharge either breast.   She denies headache, bone pain, belly pain, cough, changes in vision or gait.  Her most recent imaging includes the followin [facility]  Screen 3d mammogram- scatt fg densities- BR1    6/10/2020:  In the interim,  Kacey RICHARDSON  Jose  has done well.  She has noted no changes in her breast exam. No new masses, skin changes, nipple changes, nipple discharge either breast.   She denies headache, bone pain, belly pain, cough, changes in vision or gait.    Her most recent imaging includes the followin20 [facility] mammogram screen with rochelle-Scattered fg densities  BR1    SHe has lost 11 pounds, intentional.  She has started on an antidepressant after the COVID anxiety pushed her to her limit, and this has made her feel better.  SHe has started dating in September.    She is here fore review.    2021:  She returns today for follow up with no breast concerns or health changes.  She continues to diet and exercise when she can.  Breast MRI was completed in  with BiRads 2 results.    21, Interval History:  She returns today for follow up with no breast complaints or health changes.    Screening mammogram is scheduled 21 at [facility].        Review of Systems:  Review of Systems   Constitutional: Positive for unexpected weight change (11lb weight loss).   Cardiovascular: Positive for palpitations.   Psychiatric/Behavioral: Positive for sleep disturbance.   All other systems reviewed and are negative.       Past Medical and Surgical History:  Breast Biopsy History:  Patient has had the following breast biopsies:2 breast biopsies in the past- one RIGHT and one LEFT sided.  Both benign pathology per patient report.   Breast Cancer HIstory:  Patient does not have a past medical history of breast cancer.  Breast Operations, and year:  NONE   Obstetric/Gynecologic History:  Age menstrual periods began:13  Patient is premenopausal, first day of last period: 2017  Number of pregnancies:2  Number of live births: 1  Number of abortions or miscarriages: 1  Age of delivery of first child: 35  Patient breast fed, for the following lenth of time: 3 WEEKS   Length of time taking birth control pills: 1 MONTH   Patient has never taken hormone  replacement  The patient still has her uterus and ovaries.      Past Surgical History:   Procedure Laterality Date   • BASAL CELL CARCINOMA EXCISION Right     Face. RIGHT BREAST   • BREAST BIOPSY Left     Benign   • BREAST BIOPSY Right 2009    Complex cystic lesion   •  SECTION     • CHOLECYSTECTOMY WITH INTRAOPERATIVE CHOLANGIOGRAM N/A 2017    Procedure: CHOLECYSTECTOMY LAPAROSCOPIC INTRAOPERATIVE CHOLANGIOGRAM;  Surgeon: Tesfaye Daigle Jr., MD;  Location: [facility];  Service:    • DILATATION AND CURETTAGE     • LUMBAR DISC SURGERY     • SINUS SURGERY     • WISDOM TOOTH EXTRACTION       Past Medical History:   Diagnosis Date   • Anxiety    • Asthma    • Basal cell carcinoma     RIGHT FACE, LEFT BREAST   • BRCA gene positive     BRCA2   • Chronic sinusitis    • Constipation     WITH RECTAL BLEEDING PER DR TROY'S OFFICE NOTE   • DDD (degenerative disc disease), cervical    • Fibrocystic breast    • GERD (gastroesophageal reflux disease)    • Hashimoto's thyroiditis    • History of anemia    • History of migraine    • History of prior pregnancies     x2   • Irregular heart beat     TACHYCARDIA, SAW DR. KELLEY IN .  NO FOLLOW UP REQUIRED   • PONV (postoperative nausea and vomiting)    • S/P skin biopsy     R EAR, R BREAST, LOWER LEFT LEG, L THIGN   • Spinal headache    • Thyroid nodule     WITH GOITER   • TMJ (dislocation of temporomandibular joint)    • Varicose vein of leg    • Vitamin D deficiency        Prior Hospitalizations, other than for surgery or childbirth, and year:  NONE     Social History     Socioeconomic History   • Marital status:    • Number of children: 1   • Years of education: College   Tobacco Use   • Smoking status: Never Smoker   • Smokeless tobacco: Never Used   Substance and Sexual Activity   • Alcohol use: No   • Drug use: No     Patient is .  Patient is employed full time with the following occupation:    Patient drinks 1  "servings of caffeine per day.    Family History:  Family History   Problem Relation Age of Onset   • Cancer Mother         breast X 2 AGE 56-58   • Hypothyroidism Mother    • Heart defect Mother    • Depression Mother    • Heart failure Father    • Asthma Father    • Breast cancer Paternal Grandmother 45   • Ovarian cancer Paternal Grandmother    • Breast cancer Maternal Aunt 73   • Pancreatic cancer Maternal Uncle    • Prostate cancer Maternal Uncle    • Kidney cancer Maternal Uncle    • Hypothyroidism Other    • COPD Other    • Asthma Other    • Diabetes Other    • Asthma Sister         Rheumatoid   • Diabetes Maternal Grandmother    • Cancer Maternal Aunt         Bladder   • Malig Hyperthermia Neg Hx        Vital Signs:  /76 (BP Location: Left arm)   Pulse 82   Ht 157.5 cm (62\")   Wt 74.8 kg (165 lb)   BMI 30.18 kg/m²      Medications:    Current Outpatient Medications:   •  acetaminophen (TYLENOL) 325 MG tablet, Take 650 mg by mouth Every 6 (Six) Hours As Needed for Mild Pain ., Disp: , Rfl:   •  ALPRAZolam (XANAX) 1 MG tablet, Take 0.25 mg by mouth 2 (Two) Times a Day As Needed for Anxiety or Sleep., Disp: , Rfl:   •  Vortioxetine HBr (TRINTELLIX PO), Take  by mouth., Disp: , Rfl:      Allergies:  Allergies   Allergen Reactions   • Codeine Other (See Comments)     Causes severe stomach pains   • Levaquin [Levofloxacin In D5w] Other (See Comments)     Pins and needles, weakness, nausea, affects bloodwork results   • Tequin [Gatifloxacin] Other (See Comments)     Pins and needles, weakness, nausea, affects bloodwork   • Bupropion Other (See Comments)     INCREASED BLOOD PRESSURE   • Citalopram Mental Status Change     TACHYCARDIA   • Hydrocodone Nausea And Vomiting   • Penicillins Rash       Physical Examination:  /76 (BP Location: Left arm)   Pulse 82   Ht 157.5 cm (62\")   Wt 74.8 kg (165 lb)   BMI 30.18 kg/m²     I reviewed physical exam, no changes noted    General Appearance:  Patient is " in no distress.  She is well kept and has an  average  build.   Psychiatric:  Patient with appropriate mood and affect. Alert and oriented to self, time, and place.    Breast, RIGHT: large  sized, symmetric with the contralateral side.  Breast skin is without erythema, edema, rashes.  There is prominent hair centrally on each breast that is symmetric.  There are no visible abnormalities upon inspection during the arm-raising maneuver or with hands on hips in the sitting position. There is no nipple retraction, discharge or nipple/areolar skin changes.    There are no masses palpable in the sitting or supine positions. There is a well healed curvilinear incision in the LIQ from a past excision of a basal cell carcinoma.     Breast, LEFT:  large  sized, symmetric with the contralateral side.  Breast skin is without erythema, edema, rashes. There is prominent hair centrally on each breast that is symmetric.  There are no visible abnormalities upon inspection during the arm-raising maneuver or with hands on hips in the sitting position. There is no nipple retraction, discharge or nipple/areolar skin changes.There are no masses palpable in the sitting or supine positions.     Lymphatic:  There is no axillary, cervical, infraclavicular, or supraclavicular adenopathy bilaterally.  Musculoskeletal:  Good strength in all 4 extremities.  There is good range of motion in both shoulders.   Skin:  No new skin lesions or rashes on the skin excluding the breast (see breast exam above).    Imagin2020:  Breast MRI at [facility]   FINDINGS: There is scattered fibroglandular tissue. There is mild background parenchymal enhancement.     RIGHT BREAST:    There are foci of susceptibility from biopsy clips at 12:00, 9:00, and 10:00 in the right breast. No suspicious enhancing mass or area of non-mass enhancement is identified.  The visualized axilla is within normal limits.      LEFT BREAST:    There is a focus of  susceptibility from a biopsy clip at 7:00 in the left breast. No suspicious enhancing mass or area of non-mass enhancement is identified.  The visualized axilla is within normal limits.      EXTRAMAMMARY FINDINGS:   There are no abnormally enlarged internal mammary chain lymph nodes on either side.        IMPRESSION AND RECOMMENDATION: No MRI evidence of malignancy in either breast. Recommend annual bilateral screening mammogram in May 2021.  Recommend screening breast MRI in one year.     BI-RADS Category 2: Benign         Pathology:  11-16-11- 2X RIGHT breast biopsies for breast masses enlarging -  9:00  FA and #2,  9:00, FCC and FA    Note from Dr. Bony Duarte dated January 30, 2019.  Patient is considering proceeding ahead with mastectomy reconstruction.  After consultation she is going to consider all of her options and we'll let him know if she wants to proceed.  Discussed subpectoral and prepectoral reconstruction.        Assessment:  1)  Benign breast changes    2)  Right biopsy proven fibroadenoma     3)  Family history breast cancer    4)  Family history ovarian cancer    5)  BRCA2 mutation positive      Plan:  Kacey is doing well today.    There is no evidence of disease on her exam.  Her screening mammogram is scheduled for 11/20/21, I will call her with those results and follow up recommendations.    I saluted her on her continued weight loss.  Her mood and affect are in a good position currently.  She thinks this is a combination of the weight loss and the antidepressant and the current relationship in her life.    She is still in a place where she is not wanting to have mastectomy.    Dr Duenas previously discussed her recommendation for a bilateral risk reducing mastectomy- as the greatest level of risk reduction, of 90-95%.  She has spoken with both Dr. Stahl from gynecology and Dr. Suarez for medical oncology about her cancer risks.    SHe has seen Dr Calles and Dr Duarte in the past.    We will  continue increased imaging surveillance due to the mutation.  She knows that increased imaging surveillance does not equate with risk reduction.    - breast MRI in 6 months at [facility] followed by exam     Her psychiatrist is Dr. Linda Tesfaye.     I asked her to continue her SBE and to call us in the interim with concerns and we'd be happy to see her back sooner.      Mikayla Knight, APRN

## 2021-11-20 ENCOUNTER — HOSPITAL ENCOUNTER (OUTPATIENT)
Dept: MAMMOGRAPHY | Facility: HOSPITAL | Age: 56
Discharge: HOME | End: 2021-11-20
Admitting: NURSE PRACTITIONER

## 2021-11-20 DIAGNOSIS — N60.12 FIBROCYSTIC BREAST CHANGES, BILATERAL: ICD-10-CM

## 2021-11-20 DIAGNOSIS — Z15.09 BRCA2 GENE MUTATION POSITIVE: ICD-10-CM

## 2021-11-20 DIAGNOSIS — N60.11 FIBROCYSTIC BREAST CHANGES, BILATERAL: ICD-10-CM

## 2021-11-20 DIAGNOSIS — Z80.3 FH: BREAST CANCER: ICD-10-CM

## 2021-11-20 DIAGNOSIS — Z15.01 BRCA2 GENE MUTATION POSITIVE: ICD-10-CM

## 2021-11-20 DIAGNOSIS — Z91.89 INCREASED RISK OF BREAST CANCER: ICD-10-CM

## 2021-11-20 DIAGNOSIS — Z80.41 FH: OVARIAN CANCER: ICD-10-CM

## 2021-11-20 PROCEDURE — 77067 SCR MAMMO BI INCL CAD: CPT

## 2021-11-20 PROCEDURE — 77063 BREAST TOMOSYNTHESIS BI: CPT

## 2021-11-22 NOTE — PROGRESS NOTES
Please contact patient with stable mammogram results.  I will see her back in 5/22 after breast MRI. thanks

## 2021-11-23 ENCOUNTER — TELEPHONE (OUTPATIENT)
Dept: SURGERY | Facility: CLINIC | Age: 56
End: 2021-11-23

## 2021-11-23 NOTE — TELEPHONE ENCOUNTER
Patient is aware.     ----- Message from MELISSA Cintron sent at 11/22/2021  8:31 AM EST -----  Please contact patient with stable mammogram results.  I will see her back in 5/22 after breast MRI. thanks

## 2022-05-11 ENCOUNTER — TELEPHONE (OUTPATIENT)
Dept: SURGERY | Facility: CLINIC | Age: 57
End: 2022-05-11

## 2022-05-11 NOTE — TELEPHONE ENCOUNTER
Please let her know that I am not aware of prozac causing breast pain.  For her breast pain, she can try wearing athletic type bras, limiting caffeine and try Vitamin E daily.  We will discuss further at her clinic visit on 5/23/22. thanks

## 2022-05-11 NOTE — TELEPHONE ENCOUNTER
Caller:  PEEWEE WILLSON    Relationship: SELF  Best call back number: [phone]    What is your medical concern PT HAS STARTED TAKING PROZAC 2 WEEKS AGO AND STARTED EXPERIENCING BREST PAIN; SHE IS WANTING TO KNOW IF PROZAC EFFECTS/TRIGGER THOSE WHO HAD BRCA GENE.    How long has this issue been going on? 2 WEEKS

## 2022-05-23 ENCOUNTER — HOSPITAL ENCOUNTER (OUTPATIENT)
Dept: MRI IMAGING | Facility: HOSPITAL | Age: 57
Discharge: HOME | End: 2022-05-23
Admitting: NURSE PRACTITIONER

## 2022-05-23 DIAGNOSIS — Z80.3 FH: BREAST CANCER: ICD-10-CM

## 2022-05-23 DIAGNOSIS — N60.12 FIBROCYSTIC BREAST CHANGES, BILATERAL: ICD-10-CM

## 2022-05-23 DIAGNOSIS — Z80.41 FH: OVARIAN CANCER: ICD-10-CM

## 2022-05-23 DIAGNOSIS — N60.21 FIBROADENOSIS OF RIGHT BREAST: ICD-10-CM

## 2022-05-23 DIAGNOSIS — Z15.09 BRCA2 GENE MUTATION POSITIVE: ICD-10-CM

## 2022-05-23 DIAGNOSIS — N60.11 FIBROCYSTIC BREAST CHANGES, BILATERAL: ICD-10-CM

## 2022-05-23 DIAGNOSIS — Z15.01 BRCA2 GENE MUTATION POSITIVE: ICD-10-CM

## 2022-05-23 DIAGNOSIS — Z91.89 INCREASED RISK OF BREAST CANCER: ICD-10-CM

## 2022-05-23 PROCEDURE — 0 GADOBENATE DIMEGLUMINE 529 MG/ML SOLUTION: Performed by: NURSE PRACTITIONER

## 2022-05-23 PROCEDURE — A9577 INJ MULTIHANCE: HCPCS | Performed by: NURSE PRACTITIONER

## 2022-05-23 PROCEDURE — 77049 MRI BREAST C-+ W/CAD BI: CPT

## 2022-05-23 RX ADMIN — GADOBENATE DIMEGLUMINE 14 ML: 529 INJECTION, SOLUTION INTRAVENOUS at 19:29

## 2022-05-23 NOTE — TELEPHONE ENCOUNTER
Left detailed message on patient's voice mail with Mikayla's recommendations of a sports bra, limiting caffeine and Vit. E daily. I reminded her of her MRI appt and appt with Mikayla Knight for FU.

## 2022-05-25 ENCOUNTER — TELEPHONE (OUTPATIENT)
Dept: SURGERY | Facility: CLINIC | Age: 57
End: 2022-05-25

## 2022-05-25 NOTE — TELEPHONE ENCOUNTER
DEVI for call back to get cancelled appt r/s. Left detailed message regarding negative MRI.     ----- Message from MELISSA Cintron sent at 5/25/2022  6:22 AM EDT -----  Please contact patient and let her know her MRI was negative for abnormality.  She will need to reschedule her follow up appointment with me. thanks

## 2022-05-25 NOTE — PROGRESS NOTES
Please contact patient and let her know her MRI was negative for abnormality.  She will need to reschedule her follow up appointment with me. thanks

## 2022-06-01 ENCOUNTER — HOSPITAL ENCOUNTER (EMERGENCY)
Facility: HOSPITAL | Age: 57
Discharge: HOME | End: 2022-06-02
Attending: EMERGENCY MEDICINE | Admitting: EMERGENCY MEDICINE

## 2022-06-01 DIAGNOSIS — R07.89 CHEST WALL PAIN: ICD-10-CM
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
## 2022-06-01 DIAGNOSIS — S16.1XXA STRAIN OF NECK MUSCLE, INITIAL ENCOUNTER: ICD-10-CM

## 2022-06-01 PROCEDURE — 99283 EMERGENCY DEPT VISIT LOW MDM: CPT

## 2022-06-01 PROCEDURE — 93010 ELECTROCARDIOGRAM REPORT: CPT | Performed by: INTERNAL MEDICINE

## 2022-06-01 PROCEDURE — 93005 ELECTROCARDIOGRAM TRACING: CPT | Performed by: EMERGENCY MEDICINE

## 2022-06-01 PROCEDURE — 93005 ELECTROCARDIOGRAM TRACING: CPT

## 2022-06-02 ENCOUNTER — APPOINTMENT (OUTPATIENT)
Dept: CT IMAGING | Facility: HOSPITAL | Age: 57
End: 2022-06-02

## 2022-06-02 ENCOUNTER — APPOINTMENT (OUTPATIENT)
Dept: GENERAL RADIOLOGY | Facility: HOSPITAL | Age: 57
End: 2022-06-02

## 2022-06-02 VITALS
HEART RATE: 48 BPM | TEMPERATURE: 98.5 F | RESPIRATION RATE: 19 BRPM | SYSTOLIC BLOOD PRESSURE: 104 MMHG | DIASTOLIC BLOOD PRESSURE: 68 MMHG | OXYGEN SATURATION: 98 %

## 2022-06-02 LAB — QT INTERVAL: 356 MS

## 2022-06-02 PROCEDURE — 73030 X-RAY EXAM OF SHOULDER: CPT

## 2022-06-02 PROCEDURE — 72125 CT NECK SPINE W/O DYE: CPT

## 2022-06-02 PROCEDURE — 71046 X-RAY EXAM CHEST 2 VIEWS: CPT

## 2022-06-02 PROCEDURE — 70450 CT HEAD/BRAIN W/O DYE: CPT

## 2022-06-02 RX ORDER — METAXALONE 800 MG/1
800 TABLET ORAL 3 TIMES DAILY PRN
Qty: 15 TABLET | Refills: 0 | Status: SHIPPED | OUTPATIENT
Start: 2022-06-02
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
## 2022-06-06 ENCOUNTER — OFFICE VISIT (OUTPATIENT)
Dept: SURGERY | Facility: CLINIC | Age: 57
End: 2022-06-06

## 2022-06-06 VITALS
WEIGHT: 165 LBS | HEART RATE: 57 BPM | BODY MASS INDEX: 30.36 KG/M2 | HEIGHT: 62 IN | SYSTOLIC BLOOD PRESSURE: 117 MMHG | DIASTOLIC BLOOD PRESSURE: 80 MMHG

## 2022-06-06 DIAGNOSIS — Z80.41 FH: OVARIAN CANCER: ICD-10-CM

## 2022-06-06 DIAGNOSIS — Z91.89 INCREASED RISK OF BREAST CANCER: ICD-10-CM

## 2022-06-06 DIAGNOSIS — Z15.09 BRCA2 GENE MUTATION POSITIVE: ICD-10-CM

## 2022-06-06 DIAGNOSIS — N60.11 FIBROCYSTIC BREAST CHANGES, BILATERAL: Primary | ICD-10-CM

## 2022-06-06 DIAGNOSIS — Z15.01 BRCA2 GENE MUTATION POSITIVE: ICD-10-CM

## 2022-06-06 DIAGNOSIS — Z80.3 FH: BREAST CANCER: ICD-10-CM

## 2022-06-06 DIAGNOSIS — N60.12 FIBROCYSTIC BREAST CHANGES, BILATERAL: Primary | ICD-10-CM

## 2022-06-06 PROCEDURE — 99213 OFFICE O/P EST LOW 20 MIN: CPT | Performed by: NURSE PRACTITIONER

## 2022-06-06 RX ORDER — CYCLOBENZAPRINE HCL 10 MG
10 TABLET ORAL 2 TIMES DAILY
COMMUNITY
Start: 2022-05-31

## 2022-06-06 RX ORDER — MELOXICAM 15 MG/1
15 TABLET ORAL DAILY
COMMUNITY
Start: 2022-05-31 | End: 2022-12-21

## 2022-06-06 RX ORDER — ALPRAZOLAM 0.5 MG/1
0.5 TABLET ORAL 2 TIMES DAILY PRN
COMMUNITY
Start: 2022-05-31

## 2022-06-06 RX ORDER — PREDNISONE 20 MG/1
TABLET ORAL
COMMUNITY
Start: 2022-05-31
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
## 2022-06-07 ENCOUNTER — TELEPHONE (OUTPATIENT)
Dept: SURGERY | Facility: CLINIC | Age: 57
End: 2022-06-07

## 2022-09-13 ENCOUNTER — OFFICE VISIT (OUTPATIENT)
Dept: ORTHOPEDIC SURGERY | Facility: CLINIC | Age: 57
End: 2022-09-13

## 2022-09-13 VITALS — HEIGHT: 63 IN | WEIGHT: 165.6 LBS | TEMPERATURE: 96.9 F | BODY MASS INDEX: 29.34 KG/M2

## 2022-09-13 DIAGNOSIS — S83.242A TEAR OF MEDIAL MENISCUS OF LEFT KNEE, CURRENT, UNSPECIFIED TEAR TYPE, INITIAL ENCOUNTER: ICD-10-CM

## 2022-09-13 DIAGNOSIS — M25.862 PATELLOFEMORAL DYSFUNCTION OF LEFT KNEE: Primary | ICD-10-CM

## 2022-09-13 PROCEDURE — 99203 OFFICE O/P NEW LOW 30 MIN: CPT | Performed by: ORTHOPAEDIC SURGERY

## 2022-09-13 PROCEDURE — 73562 X-RAY EXAM OF KNEE 3: CPT | Performed by: ORTHOPAEDIC SURGERY

## 2022-09-13 RX ORDER — NAPROXEN 250 MG/1
250 TABLET ORAL 2 TIMES DAILY PRN
COMMUNITY

## 2022-09-13 NOTE — PROGRESS NOTES
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 that began in the spring she was doing a lot of biking and had pain superior and medial she does have swelling she is had night pain in the past no history of similar symptoms her symptoms are intermittent and a 6 out of 10 she did fall last year onto both knees and is unsure of the relationship of that injury to her current symptoms her symptoms are moderate intermittent aching worse with activity somewhat better with rest her past medical history is marked for anxiety skin cancer reflux tachycardia        Allergies:   Allergies   Allergen Reactions   • Codeine Other (See Comments)     Causes severe stomach pains   • Levaquin [Levofloxacin In D5w] Other (See Comments)     Pins and needles, weakness, nausea, affects bloodwork results   • Tequin [Gatifloxacin] Other (See Comments)     Pins and needles, weakness, nausea, affects bloodwork   • Bupropion Other (See Comments)     INCREASED BLOOD PRESSURE   • Citalopram Mental Status Change     TACHYCARDIA   • Hydrocodone Nausea And Vomiting   • Mobic [Meloxicam] Other (See Comments)     Causes asthma to go crazy   • Penicillins Rash       Medications:   Home Medications:  Current Outpatient Medications on File Prior to Visit   Medication Sig   • acetaminophen (TYLENOL) 325 MG tablet Take 650 mg by mouth Every 6 (Six) Hours As Needed for Mild Pain .   • ALPRAZolam (XANAX) 0.5 MG tablet Take 0.5 mg by mouth 2 (Two) Times a Day As Needed. for anxiety   • naproxen (NAPROSYN) 250 MG tablet Take 250 mg by mouth 2 (Two) Times a Day As Needed.   • cyclobenzaprine (FLEXERIL) 10 MG tablet Take 10 mg by mouth 2 (Two) Times a Day.   • meloxicam (MOBIC) 15 MG tablet Take 15 mg by mouth Daily.   • metaxalone  (SKELAXIN) 800 MG tablet Take 1 tablet by mouth 3 (Three) Times a Day As Needed for Muscle Spasms.   • predniSONE (DELTASONE) 20 MG tablet      No current facility-administered medications on file prior to visit.     Current Medications:  Scheduled Meds:  Continuous Infusions:No current facility-administered medications for this visit.    PRN Meds:.    Past Medical History:   Diagnosis Date   • Anxiety    • Asthma    • Basal cell carcinoma     RIGHT FACE, LEFT BREAST   • BRCA gene positive     BRCA2   • Chronic sinusitis    • Constipation     WITH RECTAL BLEEDING PER DR TROY'S OFFICE NOTE   • DDD (degenerative disc disease), cervical    • Fibrocystic breast    • GERD (gastroesophageal reflux disease)    • Hashimoto's thyroiditis    • History of anemia    • History of migraine    • History of prior pregnancies     x2   • Irregular heart beat     TACHYCARDIA, SAW DR. KELLEY IN .  NO FOLLOW UP REQUIRED   • PONV (postoperative nausea and vomiting)    • S/P skin biopsy     R EAR, R BREAST, LOWER LEFT LEG, L THIGN   • Spinal headache    • Thyroid nodule     WITH GOITER   • TMJ (dislocation of temporomandibular joint)    • Varicose vein of leg    • Vitamin D deficiency         Past Surgical History:   Procedure Laterality Date   • BASAL CELL CARCINOMA EXCISION Right     Face. RIGHT BREAST   • BREAST BIOPSY Left     Benign   • BREAST BIOPSY Right 2009    Complex cystic lesion   •  SECTION     • CHOLECYSTECTOMY WITH INTRAOPERATIVE CHOLANGIOGRAM N/A 2017    Procedure: CHOLECYSTECTOMY LAPAROSCOPIC INTRAOPERATIVE CHOLANGIOGRAM;  Surgeon: Tesfaye Daigle Jr., MD;  Location: [facility];  Service:    • DILATATION AND CURETTAGE     • LUMBAR DISC SURGERY     • SINUS SURGERY     • WISDOM TOOTH EXTRACTION          Social History     Occupational History   • Occupation: ; Therapist, psychiatric     Employer: [employer] beds at the [employer]   Tobacco Use   • Smoking status: Never Smoker   • Smokeless  "tobacco: Never Used   Vaping Use   • Vaping Use: Never used   Substance and Sexual Activity   • Alcohol use: No   • Drug use: No   • Sexual activity: Defer      Social History     Social History Narrative   • Not on file        Family History   Problem Relation Age of Onset   • Cancer Mother         breast X 2 AGE 56-58   • Hypothyroidism Mother    • Heart defect Mother    • Depression Mother    • Heart failure Father    • Asthma Father    • Breast cancer Paternal Grandmother 45   • Ovarian cancer Paternal Grandmother    • Breast cancer Maternal Aunt 73   • Pancreatic cancer Maternal Uncle    • Prostate cancer Maternal Uncle    • Kidney cancer Maternal Uncle    • Hypothyroidism Other    • COPD Other    • Asthma Other    • Diabetes Other    • Asthma Sister         Rheumatoid   • Diabetes Maternal Grandmother    • Cancer Maternal Aunt         Bladder   • Malig Hyperthermia Neg Hx              Review of Systems:     Review of Systems      Physical Exam: 56 y.o. female  General Appearance:    Alert, cooperative, in no acute distress                   Vitals:    09/13/22 1459   Temp: 96.9 °F (36.1 °C)   Weight: 75.1 kg (165 lb 9.6 oz)   Height: 160 cm (63\")   PainSc:   6      Patient is alert and read ×3 no acute distress appears her above-listed at height weight and age.  Affect is normal respiratory rate is normal unlabored. Heart rate regular rate rhythm, sclera, dentition and hearing are normal for the purpose of this exam.        Ortho Exam Physical exam of the left knee reveals no effusion no redness.  The patient does have tenderness about the medial joint line.  No tenderness about the lateral joint line.  A negative bounce home and a positive medial Dg.  There is some pain medially  with a lateral Dg.  Patient has a stable ligamentous exam.  Quads are reasonable and symmetric bilaterally.  Calf is soft and nontender.  There is no overlying skin changes no lymphedema lymphadenopathy.  Patient has good " hip range of motion full symmetric and asymptomatic and a normal ankle exam.    Procedures             Radiology:   AP, Lateral and merchant views of the left knee  were ordered/reviewed to evauateknee pain.  No comparative films show some mild patellofemoral OA otherwise pretty normal-looking films    Assessment/Plan:    Left knee pain in my differential would be patellofemoral issues as well as meniscal pathology we talked about an injection I think she wants to do the most conservative first therefore I will get her on a good anti-inflammatory regimen.  I will start her's meloxicam with strict precautions have her work on quad and core strengthening I will see her back in about 3 weeks.  She fails to respond could consider an injection and can also consider other means of testing

## 2022-10-11 ENCOUNTER — OFFICE VISIT (OUTPATIENT)
Dept: ORTHOPEDIC SURGERY | Facility: CLINIC | Age: 57
End: 2022-10-11

## 2022-10-11 VITALS — TEMPERATURE: 97.5 F | WEIGHT: 167.3 LBS | HEIGHT: 63 IN | BODY MASS INDEX: 29.64 KG/M2

## 2022-10-11 DIAGNOSIS — M25.562 CHRONIC PAIN OF LEFT KNEE: Primary | ICD-10-CM

## 2022-10-11 DIAGNOSIS — G89.29 CHRONIC PAIN OF LEFT KNEE: Primary | ICD-10-CM

## 2022-10-11 PROCEDURE — 99213 OFFICE O/P EST LOW 20 MIN: CPT | Performed by: ORTHOPAEDIC SURGERY

## 2022-10-11 NOTE — PROGRESS NOTES
Patient: Kacey Garcia  YOB: 1965  Date of Service: 10/11/2022    Chief Complaints: Left knee pain    Subjective:    History of Present Illness: Pt is seen in the office today with complaints of left knee pain saw her about a month ago we thought her knee was more patellofemoral possibly meniscal in origin but she is better with conservative options.  She is complaining of a lot of issues her hands her hips it does sound more like a systemic problem.  She did see rheumatology once and had what she states is a higher than normal inflammatory marker..          Allergies:   Allergies   Allergen Reactions   • Codeine Other (See Comments)     Causes severe stomach pains   • Levaquin [Levofloxacin In D5w] Other (See Comments)     Pins and needles, weakness, nausea, affects bloodwork results   • Tequin [Gatifloxacin] Other (See Comments)     Pins and needles, weakness, nausea, affects bloodwork   • Bupropion Other (See Comments)     INCREASED BLOOD PRESSURE   • Citalopram Mental Status Change     TACHYCARDIA   • Hydrocodone Nausea And Vomiting   • Mobic [Meloxicam] Other (See Comments)     Causes asthma to go crazy   • Penicillins Rash       Medications:   Home Medications:  Current Outpatient Medications on File Prior to Visit   Medication Sig   • acetaminophen (TYLENOL) 325 MG tablet Take 650 mg by mouth Every 6 (Six) Hours As Needed for Mild Pain .   • ALPRAZolam (XANAX) 0.5 MG tablet Take 0.5 mg by mouth 2 (Two) Times a Day As Needed. for anxiety   • cyclobenzaprine (FLEXERIL) 10 MG tablet Take 10 mg by mouth 2 (Two) Times a Day.   • meloxicam (MOBIC) 15 MG tablet Take 15 mg by mouth Daily.   • metaxalone (SKELAXIN) 800 MG tablet Take 1 tablet by mouth 3 (Three) Times a Day As Needed for Muscle Spasms.   • naproxen (NAPROSYN) 250 MG tablet Take 250 mg by mouth 2 (Two) Times a Day As Needed.   • predniSONE (DELTASONE) 20 MG tablet      No current facility-administered medications on file prior to  visit.     Current Medications:  Scheduled Meds:  Continuous Infusions:No current facility-administered medications for this visit.    PRN Meds:.    I have reviewed the patient's medical history in detail and updated the computerized patient record.  Review and summarization of old records include:    Past Medical History:   Diagnosis Date   • Anxiety    • Asthma    • Basal cell carcinoma     RIGHT FACE, LEFT BREAST   • BRCA gene positive     BRCA2   • Chronic sinusitis    • Constipation     WITH RECTAL BLEEDING PER DR TROY'S OFFICE NOTE   • DDD (degenerative disc disease), cervical    • Fibrocystic breast    • GERD (gastroesophageal reflux disease)    • Hashimoto's thyroiditis    • History of anemia    • History of migraine    • History of prior pregnancies     x2   • Irregular heart beat     TACHYCARDIA, SAW DR. KELLEY IN .  NO FOLLOW UP REQUIRED   • PONV (postoperative nausea and vomiting)    • S/P skin biopsy     R EAR, R BREAST, LOWER LEFT LEG, L THIGN   • Spinal headache    • Thyroid nodule     WITH GOITER   • TMJ (dislocation of temporomandibular joint)    • Varicose vein of leg    • Vitamin D deficiency         Past Surgical History:   Procedure Laterality Date   • BASAL CELL CARCINOMA EXCISION Right     Face. RIGHT BREAST   • BREAST BIOPSY Left     Benign   • BREAST BIOPSY Right 2009    Complex cystic lesion   •  SECTION     • CHOLECYSTECTOMY WITH INTRAOPERATIVE CHOLANGIOGRAM N/A 2017    Procedure: CHOLECYSTECTOMY LAPAROSCOPIC INTRAOPERATIVE CHOLANGIOGRAM;  Surgeon: Tesfaye Dailge Jr., MD;  Location: [facility];  Service:    • DILATATION AND CURETTAGE     • LUMBAR DISC SURGERY     • SINUS SURGERY     • WISDOM TOOTH EXTRACTION          Social History     Occupational History   • Occupation: ; Therapist, psychiatric     Employer: [employer]   Tobacco Use   • Smoking status: Never   • Smokeless tobacco: Never   Vaping Use   • Vaping Use: Never used    Substance and Sexual Activity   • Alcohol use: No   • Drug use: No   • Sexual activity: Defer      Social History     Social History Narrative   • Not on file        Family History   Problem Relation Age of Onset   • Cancer Mother         breast X 2 AGE 56-58   • Hypothyroidism Mother    • Heart defect Mother    • Depression Mother    • Heart failure Father    • Asthma Father    • Breast cancer Paternal Grandmother 45   • Ovarian cancer Paternal Grandmother    • Breast cancer Maternal Aunt 73   • Pancreatic cancer Maternal Uncle    • Prostate cancer Maternal Uncle    • Kidney cancer Maternal Uncle    • Hypothyroidism Other    • COPD Other    • Asthma Other    • Diabetes Other    • Asthma Sister         Rheumatoid   • Diabetes Maternal Grandmother    • Cancer Maternal Aunt         Bladder   • Malig Hyperthermia Neg Hx        ROS: 14 point review of systems was performed and was negative except for documented findings in HPI and today's encounter.     Allergies:   Allergies   Allergen Reactions   • Codeine Other (See Comments)     Causes severe stomach pains   • Levaquin [Levofloxacin In D5w] Other (See Comments)     Pins and needles, weakness, nausea, affects bloodwork results   • Tequin [Gatifloxacin] Other (See Comments)     Pins and needles, weakness, nausea, affects bloodwork   • Bupropion Other (See Comments)     INCREASED BLOOD PRESSURE   • Citalopram Mental Status Change     TACHYCARDIA   • Hydrocodone Nausea And Vomiting   • Mobic [Meloxicam] Other (See Comments)     Causes asthma to go crazy   • Penicillins Rash     Constitutional:  Denies fever, shaking or chills   Eyes:  Denies change in visual acuity   HENT:  Denies nasal congestion or sore throat   Respiratory:  Denies cough or shortness of breath   Cardiovascular:  Denies chest pain or severe LE edema   GI:  Denies abdominal pain, nausea, vomiting, bloody stools or diarrhea   Musculoskeletal:  Numbness, tingling, or loss of motor function only as  noted above in history of present illness.  : Denies painful urination or hematuria  Integument:  Denies rash, lesion or ulceration   Neurologic:  Denies headache or focal weakness  Endocrine:  Denies lymphadenopathy  Psych:  Denies confusion or change in mental status   Hem:  Denies active bleeding      Physical Exam: 56 y.o. female  Wt Readings from Last 3 Encounters:   09/13/22 75.1 kg (165 lb 9.6 oz)   06/06/22 74.8 kg (165 lb)   11/16/21 74.8 kg (165 lb)       There is no height or weight on file to calculate BMI.  No height and weight on file for this encounter.  There were no vitals filed for this visit.  Vital signs reviewed.   General Appearance:    Alert, cooperative, in no acute distress                    Ortho exam    Physical exam of the left knee reveals no effusion no redness.  The patient does have tenderness about the medial joint line.  No tenderness about the lateral joint line.  A negative bounce home and a positive medial Dg.  There is some pain medially  with a lateral Dg.  Patient has a stable ligamentous exam.  Quads are reasonable and symmetric bilaterally.  Calf is soft and nontender.  There is no overlying skin changes no lymphedema lymphadenopathy.  Patient has good hip range of motion full symmetric and asymptomatic and a normal ankle exam.         .time    Assessment:   Left knee pain it is better with conservative measures she still has a lot of complaints and a lot of areas this could be fibromyalgia but I do think it is more of a systemic thing I have encouraged her to get back to endocrinology/rheumatology and see if they should repeat her labs etc.  Plan: Her knee does seem to be better now if it worsens we could get an MRI to better evaluate this  Follow up as indicated.  Ice, elevate, and rest as needed.  Discussed conservative measures of pain control including ice, bracing.  Also talked about the importance of strengthening and maintaining ideal body  nasir Daigle M.D.

## 2022-12-17 ENCOUNTER — HOSPITAL ENCOUNTER (OUTPATIENT)
Dept: MAMMOGRAPHY | Facility: HOSPITAL | Age: 57
Discharge: HOME | End: 2022-12-17
Admitting: NURSE PRACTITIONER

## 2022-12-17 DIAGNOSIS — Z91.89 INCREASED RISK OF BREAST CANCER: ICD-10-CM

## 2022-12-17 DIAGNOSIS — Z80.41 FH: OVARIAN CANCER: ICD-10-CM

## 2022-12-17 DIAGNOSIS — N60.12 FIBROCYSTIC BREAST CHANGES, BILATERAL: ICD-10-CM

## 2022-12-17 DIAGNOSIS — Z80.3 FH: BREAST CANCER: ICD-10-CM

## 2022-12-17 DIAGNOSIS — Z15.09 BRCA2 GENE MUTATION POSITIVE: ICD-10-CM

## 2022-12-17 DIAGNOSIS — Z15.01 BRCA2 GENE MUTATION POSITIVE: ICD-10-CM

## 2022-12-17 DIAGNOSIS — N60.11 FIBROCYSTIC BREAST CHANGES, BILATERAL: ICD-10-CM

## 2022-12-17 PROCEDURE — 77063 BREAST TOMOSYNTHESIS BI: CPT

## 2022-12-17 PROCEDURE — 77067 SCR MAMMO BI INCL CAD: CPT

## 2022-12-21 ENCOUNTER — OFFICE VISIT (OUTPATIENT)
Dept: SURGERY | Facility: CLINIC | Age: 57
End: 2022-12-21

## 2022-12-21 ENCOUNTER — TELEPHONE (OUTPATIENT)
Dept: SURGERY | Facility: CLINIC | Age: 57
End: 2022-12-21

## 2022-12-21 VITALS
SYSTOLIC BLOOD PRESSURE: 118 MMHG | BODY MASS INDEX: 29.59 KG/M2 | DIASTOLIC BLOOD PRESSURE: 78 MMHG | HEIGHT: 63 IN | WEIGHT: 167 LBS

## 2022-12-21 DIAGNOSIS — N60.11 FIBROCYSTIC BREAST CHANGES, BILATERAL: Primary | ICD-10-CM

## 2022-12-21 DIAGNOSIS — N60.12 FIBROCYSTIC BREAST CHANGES, BILATERAL: Primary | ICD-10-CM

## 2022-12-21 DIAGNOSIS — Z80.41 FH: OVARIAN CANCER: ICD-10-CM

## 2022-12-21 DIAGNOSIS — Z15.01 BRCA2 GENE MUTATION POSITIVE: ICD-10-CM

## 2022-12-21 DIAGNOSIS — Z91.89 INCREASED RISK OF BREAST CANCER: ICD-10-CM

## 2022-12-21 DIAGNOSIS — Z80.3 FH: BREAST CANCER: ICD-10-CM

## 2022-12-21 DIAGNOSIS — N60.21 FIBROADENOSIS OF RIGHT BREAST: ICD-10-CM

## 2022-12-21 DIAGNOSIS — Z15.09 BRCA2 GENE MUTATION POSITIVE: ICD-10-CM

## 2022-12-21 PROCEDURE — 99213 OFFICE O/P EST LOW 20 MIN: CPT | Performed by: NURSE PRACTITIONER

## 2022-12-21 NOTE — PROGRESS NOTES
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  She knows that increased imaging surveillance does not equate with risk reduction.    - breast MRI in 6 months at [facility] followed by exam     Her psychiatrist is Dr. Linda Tesfaye.     I asked her to continue her SBE and to call us in the interim with concerns and we'd be happy to see her back sooner.      MELISSA Cintron

## 2023-06-17 ENCOUNTER — HOSPITAL ENCOUNTER (OUTPATIENT)
Dept: MRI IMAGING | Facility: HOSPITAL | Age: 58
Discharge: HOME | End: 2023-06-17
Payer: COMMERCIAL

## 2023-06-17 DIAGNOSIS — Z91.89 INCREASED RISK OF BREAST CANCER: ICD-10-CM

## 2023-06-17 DIAGNOSIS — Z80.41 FH: OVARIAN CANCER: ICD-10-CM

## 2023-06-17 DIAGNOSIS — N60.21 FIBROADENOSIS OF RIGHT BREAST: ICD-10-CM

## 2023-06-17 DIAGNOSIS — Z15.01 BRCA2 GENE MUTATION POSITIVE: ICD-10-CM

## 2023-06-17 DIAGNOSIS — N60.12 FIBROCYSTIC BREAST CHANGES, BILATERAL: ICD-10-CM

## 2023-06-17 DIAGNOSIS — Z15.09 BRCA2 GENE MUTATION POSITIVE: ICD-10-CM

## 2023-06-17 DIAGNOSIS — N60.11 FIBROCYSTIC BREAST CHANGES, BILATERAL: ICD-10-CM

## 2023-06-17 DIAGNOSIS — Z80.3 FH: BREAST CANCER: ICD-10-CM

## 2023-06-17 PROCEDURE — 0 GADOBENATE DIMEGLUMINE 529 MG/ML SOLUTION: Performed by: NURSE PRACTITIONER

## 2023-06-17 PROCEDURE — A9577 INJ MULTIHANCE: HCPCS | Performed by: NURSE PRACTITIONER

## 2023-06-17 PROCEDURE — 77049 MRI BREAST C-+ W/CAD BI: CPT

## 2023-06-17 RX ADMIN — GADOBENATE DIMEGLUMINE 15 ML: 529 INJECTION, SOLUTION INTRAVENOUS at 10:14

## 2023-07-21 ENCOUNTER — PREP FOR SURGERY (OUTPATIENT)
Dept: OTHER | Facility: HOSPITAL | Age: 58
End: 2023-07-21

## 2023-07-21 DIAGNOSIS — Z17.1 MALIGNANT NEOPLASM OF UPPER-OUTER QUADRANT OF LEFT BREAST IN FEMALE, ESTROGEN RECEPTOR NEGATIVE: Primary | ICD-10-CM

## 2023-07-21 DIAGNOSIS — C50.412 MALIGNANT NEOPLASM OF UPPER-OUTER QUADRANT OF LEFT BREAST IN FEMALE, ESTROGEN RECEPTOR NEGATIVE: Primary | ICD-10-CM

## 2023-07-21 RX ORDER — SODIUM CHLORIDE, SODIUM LACTATE, POTASSIUM CHLORIDE, CALCIUM CHLORIDE 600; 310; 30; 20 MG/100ML; MG/100ML; MG/100ML; MG/100ML
100 INJECTION, SOLUTION INTRAVENOUS CONTINUOUS
Status: CANCELLED | OUTPATIENT
Start: 2023-07-21

## 2023-07-21 RX ORDER — DIAZEPAM 5 MG/1
10 TABLET ORAL ONCE
Status: CANCELLED | OUTPATIENT
Start: 2023-07-21 | End: 2023-07-21

## 2023-07-21 RX ORDER — CEFAZOLIN SODIUM 2 G/100ML
2000 INJECTION, SOLUTION INTRAVENOUS ONCE
Status: CANCELLED | OUTPATIENT
Start: 2023-07-21 | End: 2023-07-21

## 2023-07-21 NOTE — H&P
"There are no changes in the history or physical exam, aside from any that are listed as an addendum at the bottom of this document.   Today, patient will go for the surgery listed in the plan below.     Chief Complaint: Kacey Garcia is a 57 y.o. female who was seen in consultation at the request of Kayy Stahl MD  for newly diagnosed breast cancer    History of Present Illness:  Ms Garcia has a significant family history of breast cancer, including her mother diagnosed at age 58 and her paternal grandmother diagnosed at age 45.  She has had 2 breast biopsies in the past. The first was in 2008, LEFT breast,  by Dr Russell at [facility] and benign;  and the most recent biopsy was performed 1/8/2009 at [facility] by Dr Christiano Castrejon, for a complex cystic lesion at the 12:00 RIGHT breast location.  The pathology from this returned as cystic apocrine metaplasia with increased fibrosis.   She had her annual mammogram 2/2/10 at [facility] that showed in the RIGHT breast, just lateral to the marker from the most recent biopsy, a circumscribed nodule 7mm in diameter, new compared to prior- read as BR0.      She has noted no masses in either breast, no skin changes, no nipple changes.  She does note some \"knottiness\" in the RIGHT UOQ that fluctuates in consistency and size.  SHe had additional imaging the day of her intiial visit that showed on the mammogram that the RIGHT mammo lesion compressed out, and on ultraosund, clustered microcysts at the 12:00 location.     Mrs. Garcia underwent a breast MRI on 9-17-10  that showed, in comparison to the 1-18-08 MRI-- reidentification of a 10x8mm oval well circumscribed nodule RIGHT breast 9:00, unchanged or possibly smaller, with benign imaging features likely a LN or FA. Ther eis a second smaller but similarly enhancing nosdule at the 9:00 lcoation posterior and superior to the preexisting nodule, also has benign features. Rec correlation with targeted ultrasound.  LEFT no findings.  She " then had an ultraosund today of the RIGHT breast, that showed 2 aeparate solid lesions at the 9:00 location,  by about 5 mm, that correlate to 2 findings from her MRI from 9-2010. THe first  stable on MRI, and 1.1 cm in size; the second new on the 9-2010 MRI, 9mm in size. Both with brenign imaging features oval circumscribed, likely fibroadenomas, BR3, rec 6 month FU ultrasound. Cash.  She has noted no new masses, skin changes, nipple changes, nipple discharge either breast.    Since our last visit, Mrs. Garcia has done well. She reports no new masses, skin changes, nipple changes, nipple discahrge from either breast. SHe does report some tenderness RIGHT breast laterally when she has stress. Her bilateral mammgoram and RIGHT US at [facility] from 2-21-11 showed 2 stable masses in the RIGHT breast at the 9:00 location, 5 CFN, imaging consistent with benign FA. Rec area reevaluated in 6months to ensure stability.  BR3.   Also rec continued MRI surveillance.    Since our last visit, Kacey had her imaging done, as below.  She had 2 RIGHT breast biopsies as below. Both returned as fibroadenomas. Other than tenderness related to the biopsy sites, she has noted no other changes in her exam- no masses, skin changes, nipple changes, nipple discharge.   She has gotten  since our last visit. SHe has had a 10 # weight gain related to stress.   [id number] Breast Ultrasound [facility] Kacey Garcia  Followup right breast nodule  Impression: Stable 10 mm to 11 mm nodule in the 9 o'clock position of the right breast 5 cm from the nipple. The second larger nodule has increased in size from the 02/21/2011 study and biopsy of this nodule is recommended.    11-10-11- Mrs Hoyos MRI showed   Two well circumscribed enhancing masses at the 9-00      location within the right breast, one of which shows interval increase      in size since the preceding MRI dated 09/17/2010. These correspond to      solid masses shown on breast  ultrasound. Evaluation with biopsy of the      enlarging mass is recommended. Biopsy of the immediately adjacent stable      mass may also be prudent since it would obviate the need for additional      imaging followup if the mass proved to be, as expected,      histopathologically benign. There are no findings suspicious for      malignancy within the left breast.    Her US 11-7-11 showed 2 nodules at 9:00. The first is stable on  2-21-11 at 1.1x1.0 cm. The second larger lesion has enlarged to a size of  1.6x1.3 cm up from 1.2 cm in 2-2011. Biopsy of the second lesion due to enlargment recommended.     11-16-11- Her pathology returned as 9:00  FA and #2,  9:00, Merged with [facility] and FA.      Since our last visit, Kacey has done well. She has adjusted to being  and feels good. She has gained weight, but she thinks this is related to the stress of 2 jobs. She has noted no change sin her breast exam. No new masses, skin cahgnes, nipple changes, nipple discahrge eithe rbreast. Her most recent imagingi s from today 4-27-12 bilateral DX mammogram BR2 for post biopsy changes with 2 clips RIGHT. BR2.      Since our last visit, Mrs. Garcia has done well. She has noted no new masses, skin changes, nipple changes, nipple discharge either breast.   Her most recent imaging is a bilateral MRI [facility] 11-12-12 BR1 negative.  Her review of systems is unchanged other than  the above since 4-27-12.  I have reviewed the patient's Past Medical History, Family History, Social History, medications and allergies and confirm that the information is up to date and accurate.      Kacey has not seen us since December 2012.  She reports that in late November 2016, that she waxed her nipples 1 evening.  That night she had intercourse, and awoke the next morning with redness and warmth of the right breast and a small amount of drainage that had spotted on her T-shirt.  She has noted no additional nipple drainage.    She was treated with Keflex and her  symptoms resolved.    She feels now like when she gets out of the shower the right nipple may be a little bit darker than the left.      She noted no other masses, skin changes, prior to her most recent imaging.   Her most recent imaging includes a bilateral mammogram with 3-D December 14, 2016 at [facility] that was BI-RADS 2.  December 9, 2016 a right breast ultrasound also at [facility] for right nipple discharge, showed periareolar 1-2 minimally prominent ducts, benign.    She has had 2 right breast biopsies in the past that returned as fibroadenomata and fibrocystic change    She has her uterus and ovaries, is perimenopausal, and takes no hormones.  Her family history includes the following: She has one paternal aunt and 5 maternal aunts.  One maternal aunt had breast cancer at age 73 and her mother had breast cancer age 56.  She has a paternal grandmother with breast and ovarian cancer.  Her breast cancer was diagnosed around age 57.  No other breast or ovarian cancer in her family.        In the interim,  Kacey Garcia  has done well.  She has noted no changes in her breast exam. No new masses, skin changes, nipple changes, nipple discharge either breast.     Upon our advice, K[facility] saw genetics [facility] of BRCA1-2 analysis with cancer next- showed a BRCA2 mutation [id number].     I arranged for her to have a breast MRI.  [facility] May 18, 2017 bilateral breast MRI. Right breast 9:30 bowtie shaped metallic clip- not the ribbon shaped marker which is located more anterior and inferior and not the third marker that is more central and 12:00. from surrounding enhancement that measures 1.2 cm in greatest dimension. Recommend stereotactic guided biopsy of the clip and subsequent surrounding enhancement. No findings suspicious on the left.     I reviewed the procedure notes from January 2009 in November 2011. In 2009 a barbed or knot shaped marker was  left at the 12:00 location and the pathology returned as cystic apically metaplasia with dense stromal fibrosis and was felt concordant. In November 2011 a right breast biopsy of 2 masses at 9:00 were done and both masses returned as fibroadenoma. The bowtie clip was posterior-superior in the ribbon clip was anterior inferior both at 9:00. I discussed the MRI results with Dr. Castrejon today. He felt that a second look ultrasound may be the next best step since the area of enhancement at 9:00 had increased T2 signal and this is favorable and he is previously sampled the masses. Therefore we arranged for her had a right breast Second Look ultrasound.    This was done yesterday May 24, 2017 and showed macrolobulated hypoechoic mass with an internal metallic clip 9:30 right breast 5 cm from the nipple.  Most consistent with fibroadenoma.  Consistent with a recurrent fibroadenoma previously biopsied.  6 month follow-up is recommended.  BI-RADS 3.    Her most recent mammogram was December 14, 2016 no radiographic evidence of malignancy.  BI-RADS 2.    She has had an intentional 22-1/2 pound weight loss.    In the interim, Kacey has talked with Dr. Hall about reconstruction and they have decided to proceed with expander reconstruction at the time of her surgery.  She has also discussed oophorectomy with Dr. Stahl who has deferred the timing to Kacey's preference.    She denies any changes in her exam.      In the interim,  Kacey Garcia  has done well.  She has noted no changes in her breast exam. No new masses, skin changes, nipple changes, nipple discharge either breast.   She denies headache, bone pain, belly pain, cough, changes in vision or gait.  Her most recent imaging includes the following:  December 21, 2017 bilateral mammary with 3-D [facility]: Scattered fibroglandular densities.  BI-RADS 1.     Right breast ultrasound: [facility]: There is been no interval change in size of a  lobulated nodule 9:30, 5 cm from the nipple.  There is a postbiopsy marker within it.  It measures 1.6 cm.  BI-RADS 3 recommend 6 month follow-up ultrasound.    She underwent November 20, 2017 a lap cholecystectomy. Dr. Fritz Daigle.  She tells me that she was having intermittent abdominal pain for which she had no explanation, and is now feeling much better.  She saw her psychiatrist who recommended medication but she elected not to start this.  She tells me from a psychological standpoint she is doing better since the resolution of her pain.        In the interim,  Kacey Garcia  has done well.  She has noted no changes in her breast exam. No new masses, skin changes, nipple changes, nipple discharge either breast.   She denies headache, bone pain, belly pain, cough, changes in vision or gait.    Her most recent imaging includes the following:  [facility] breast MRI June 4, 2019: At the right breast 9:30, 5 cm from the nipple there is a metallic clip within a weakly enhancing mass measuring 1.6 cm consistent with fibroadenoma.  No findings suspicious in either breast BI-RADS 2.  [facility] June 5, 2018 right breast ultrasound: At 9:30, 5 cm from the nipple there is a 1.6 and meter lobulated mass slightly smaller than seen on the prior.  Clip is present.  Stable fibroadenoma.  BI-RADS 2.    Her weight is stable.  SHe has not seen her gynecologist for interval surveillance.      In the interim,  Kacey Garcia  has done well.  She is seeing Dr. Kayy Stahl and they have discussed removal of her ovaries and transvaginal ultrasound.  She is seeing Dr. Suarez for medical oncology and they have discussed risk reducing medications.  She comes today for follow-up.  She has noted no changes in her breast exam. No new masses, skin changes, nipple changes, nipple discharge either breast.   She denies headache, bone pain, belly pain, cough, changes in vision or gait.  Her most recent imaging includes the  followin19- [facility]  Screen 3d mammogram- scatt fg densities- BR1    In the interim,  Kacey Garcia  has done well.  She has noted no changes in her breast exam. No new masses, skin changes, nipple changes, nipple discharge either breast.   She denies headache, bone pain, belly pain, cough, changes in vision or gait.    Her most recent imaging includes the followin20 [facility] mammogram screen with cam-  Scattered fg densities  BR1    SHe has lost 11 pounds, intentional.  She has started on an antidepressant after the COVID anxiety pushed her to her limit, and this has made her feel better.  SHe has started dating in September.      Interval History:  In the interim,  Kacey Garcia  has done well. SHe is in a serious relationship for a few years with Stefan, who is with her today.    She has noted no changes in her breast exam. No new masses, skin changes, nipple changes, nipple discharge either breast.   She denies headache, bone pain, belly pain, cough, changes in vision or gait.  Her most recent imaging includes the followin2022, MRI Breast Bilateral ([facility])  BI-RADS 1: Negative    2022, MMG Screening Digital Cam Bilateral ([facility])  BI-RADS 2: Benign    2023, MRI Breast Bilateral ([facility])  Left Breast: 12:00 in the posterior left breast, 8.4 cm posterior to the nipple, irregular enhancing mass, 1.1 cm. The visualized axilla is within normal limits. There are no pathologically enlarged internal mammary chain lymph nodes on either side.  No MRI evidence of malignancy in the right breast.  BI-RADS 4: Suspicious    2023, US Breast Left Limited ([facility])  12:00 o'clock 7 cm from the nipple 0.8 x 0.7 x 0.7 cm irregular hypoechoic mass.  BI-RADS 4: Suspicious    She then had an ultrasound guided biopsy:  2023, US Guided Breast Biopsy ([facility])  10-gauge multiple tissue specimen bowtie shaped metallic clip placed. Bowtie shaped metallic clip in the posterior one third of the left breast at  the 12:00 position.  The pathology is concordant.    PATHOLOGY REPORT  Breast, Left, 12 O'clock, 7 cm From Nipple, Biopsy: Invasive ductal adenocarcinoma and ductal carcinoma in-situ with Joanne Grade III (tubular grade 3, nuclear grade 3, mitotic grade 3 up to 5 mm.  No definitive perineural or lymphovascular invasion is identified. The ductal carcinoma in situ of intermediate to high nuclear grade with solid with cribriform architecture and focal necrosis.      ER Negative (less than 1%)    IN Negative (Less than 1%)    HER2 (Negative Score 0)    KI-67 60%      Review of Systems:  Review of Systems   All other systems reviewed and are negative.     Past Medical and Surgical History:  Breast Biopsy History:  Patient has had the following breast biopsies:2 breast biopsies in the past- one RIGHT and one LEFT sided.  Both benign pathology per patient report.   Breast Cancer HIstory:  Patient does not have a past medical history of breast cancer.  Breast Operations, and year:  NONE   Obstetric/Gynecologic History:  Age menstrual periods began:13  Patient is premenopausal, first day of last period: 2017  Number of pregnancies:2  Number of live births: 1  Number of abortions or miscarriages: 1  Age of delivery of first child: 35  Patient breast fed, for the following lenth of time: 3 WEEKS   Length of time taking birth control pills: 1 MONTH   Patient has never taken hormone replacement  The patient still has her uterus and ovaries.      Past Surgical History:   Procedure Laterality Date    BASAL CELL CARCINOMA EXCISION Right     Face. RIGHT BREAST    BREAST BIOPSY Left     Benign    BREAST BIOPSY Right 2009    Complex cystic lesion     SECTION      CHOLECYSTECTOMY WITH INTRAOPERATIVE CHOLANGIOGRAM N/A 2017    Procedure: CHOLECYSTECTOMY LAPAROSCOPIC INTRAOPERATIVE CHOLANGIOGRAM;  Surgeon: Tesfaye Daigle Jr., MD;  Location: [facility] OR;  Service:     DILATATION AND CURETTAGE      LUMBAR  DISC SURGERY      SINUS SURGERY      WISDOM TOOTH EXTRACTION       Past Medical History:   Diagnosis Date    Anxiety     Asthma     Basal cell carcinoma     RIGHT FACE, LEFT BREAST    BRCA gene positive     BRCA2    Chronic sinusitis     Constipation     WITH RECTAL BLEEDING PER DR TROY'S OFFICE NOTE    DDD (degenerative disc disease), cervical     Fibrocystic breast     GERD (gastroesophageal reflux disease)     Hashimoto's thyroiditis     History of anemia     History of migraine     History of prior pregnancies     x2    Irregular heart beat     TACHYCARDIA, SAW DR. KELLEY IN 2015.  NO FOLLOW UP REQUIRED    PONV (postoperative nausea and vomiting)     S/P skin biopsy     R EAR, R BREAST, LOWER LEFT LEG, L THIGN    Spinal headache     Thyroid nodule     WITH GOITER    TMJ (dislocation of temporomandibular joint)     Varicose vein of leg     Vitamin D deficiency        Prior Hospitalizations, other than for surgery or childbirth, and year:  NONE     Social History     Socioeconomic History    Marital status: Single    Number of children: 1    Years of education: College   Tobacco Use    Smoking status: Never    Smokeless tobacco: Never   Vaping Use    Vaping Use: Never used   Substance and Sexual Activity    Alcohol use: No    Drug use: No    Sexual activity: Defer     Patient is .  Patient is employed full time with the following occupation:    Patient drinks 1 servings of caffeine per day.    Family History:  Family History   Problem Relation Age of Onset    Cancer Mother         breast X 2 AGE 56-58    Hypothyroidism Mother     Heart defect Mother     Depression Mother     Heart failure Father     Asthma Father     Breast cancer Paternal Grandmother 45    Ovarian cancer Paternal Grandmother     Breast cancer Maternal Aunt 73    Pancreatic cancer Maternal Uncle     Prostate cancer Maternal Uncle     Kidney cancer Maternal Uncle     Hypothyroidism Other     COPD Other     Asthma Other      "Diabetes Other     Asthma Sister         Rheumatoid    Diabetes Maternal Grandmother     Cancer Maternal Aunt         Bladder    Malig Hyperthermia Neg Hx        Vital Signs:  /68 (BP Location: Left arm, Patient Position: Sitting, Cuff Size: Adult)   Pulse 88   Ht 160 cm (62.99\")   Wt 73.9 kg (163 lb)   SpO2 98%   BMI 28.88 kg/m²      Medications:    Current Outpatient Medications:     acetaminophen (TYLENOL) 325 MG tablet, Take 2 tablets by mouth Every 6 (Six) Hours As Needed for Mild Pain., Disp: , Rfl:     ALPRAZolam (XANAX) 0.5 MG tablet, Take 1 tablet by mouth 2 (Two) Times a Day As Needed. for anxiety, Disp: , Rfl:     albuterol sulfate  (90 Base) MCG/ACT inhaler, INHALE 2 PUFFS BY MOUTH INTO THE LUNGS THREE TIMES DAILY AS NEEDED (Patient not taking: Reported on 7/21/2023), Disp: , Rfl:     cyclobenzaprine (FLEXERIL) 10 MG tablet, Take 1 tablet by mouth 2 (Two) Times a Day. (Patient not taking: Reported on 7/21/2023), Disp: , Rfl:     FLUoxetine (PROzac) 10 MG tablet, Take 1 tablet by mouth Daily. (Patient not taking: Reported on 7/21/2023), Disp: , Rfl:     metaxalone (SKELAXIN) 800 MG tablet, Take 1 tablet by mouth 3 (Three) Times a Day As Needed for Muscle Spasms. (Patient not taking: Reported on 7/21/2023), Disp: 15 tablet, Rfl: 0    naproxen (NAPROSYN) 250 MG tablet, Take 1 tablet by mouth 2 (Two) Times a Day As Needed. (Patient not taking: Reported on 7/21/2023), Disp: , Rfl:     predniSONE (DELTASONE) 20 MG tablet, , Disp: , Rfl:      Allergies:  Allergies   Allergen Reactions    Codeine Other (See Comments)     Causes severe stomach pains    Levaquin [Levofloxacin In D5w] Other (See Comments)     Pins and needles, weakness, nausea, affects bloodwork results    Levofloxacin Other (See Comments)     Pins and needles, weakness, nausea, affects bloodwork results    Tequin [Gatifloxacin] Other (See Comments)     Pins and needles, weakness, nausea, affects bloodwork    Bupropion Other (See " "Comments)     INCREASED BLOOD PRESSURE    Citalopram Mental Status Change     TACHYCARDIA    Hydrocodone Nausea And Vomiting    Mobic [Meloxicam] Other (See Comments)     Causes asthma to go crazy    Penicillins Rash       Physical Examination:  /68 (BP Location: Left arm, Patient Position: Sitting, Cuff Size: Adult)   Pulse 88   Ht 160 cm (62.99\")   Wt 73.9 kg (163 lb)   SpO2 98%   BMI 28.88 kg/m²     General Appearance:  Patient is in no distress.  She is well kept and has an  average  build.   Psychiatric:  Patient with appropriate mood and affect. Alert and oriented to self, time, and place.    Breast, RIGHT: large  sized, symmetric with the contralateral side.  Breast skin is without erythema, edema, rashes. There are no visible abnormalities upon inspection during the arm-raising maneuver or with hands on hips in the sitting position. There is no nipple retraction, discharge or nipple/areolar skin changes.    There are no masses palpable in the sitting or supine positions. There is a well healed curvilinear incision in the LIQ from a past excision of a basal cell carcinoma.     Breast, LEFT:  large  sized, symmetric with the contralateral side.  Breast skin is without erythema, edema, rashes.   There are no visible abnormalities upon inspection during the arm-raising maneuver or with hands on hips in the sitting position. There is no nipple retraction, discharge or nipple/areolar skin changes.There are no masses palpable in the sitting or supine positions. There is a tender ecchymoses LEFT superior breast from her recent core biopsy. No erythema, warmth drainage from mammotomy.    Lymphatic:  There is no axillary, cervical, infraclavicular, or supraclavicular adenopathy bilaterally.  Eyes:  Pupils are round and reactive to light.  Cardiovascular:  Heart rate and rhythm are regular.  Respiratory:  Lungs are clear bilaterally with no crackles or wheezes in any lung field.  Gastrointestinal:  Abdomen " is soft, nondistended, and nontender. There are no scars from previous surgery.   Musculoskeletal:  Good strength in all 4 extremities.  There is good range of motion in both shoulders.   Skin:  No new skin lesions or rashes on the skin excluding the breast (see breast exam above).    Imagin-4-10 additional diagnostic views and a targetted RIGHT US [facility] to further evaluate the RIGHT breast nodule seen on mammo- showed the RIGHT lesion compressed  out on diagnostic views, suggesting that there is not a true mass on her screening mammogram.  On targetted US, there were clustered microcysts around the 12:00 location, at the site of her prior biopsy.  No solid lesions or macrocysts. BR2.    Breast MRI on 9-17-10  that showed, in comparison to the 08 MRI-- reidentification of a 10x8mm oval well circumscribed nodule RIGHT breast 9:00, unchanged or possibly smaller, with benign imaging features likely a LN or FA. Ther eis a second smaller but similarly enhancing nosdule at the 9:00 lcoation posterior and superior to the preexisting nodule, also has benign features. Rec correlation with targeted ultrasound.  LEFT no findings.  Ultrasound 10-11-10 of the RIGHT breast, that showed 2 separate solid lesions at the 9:00 location,  by about 5 mm, that correlate to 2 findings from her MRI from . THe first  stable on MRI, and 1.1 cm in size; the second new on the  MRI, 9mm in size. Both with brenign imaging features oval circumscribed, likely fibroadenomas, BR3, rec 6 month FU ultrasound. Cash.    Bilateral mammgoram and RIGHT US at [facility] from 11 showed 2 stable masses in the RIGHT breast at the 9:00 location, 5 CFN, imaging consistent with benign FA. Rec area reevaluated in 6months to ensure stability.  BR3.     11-10-11- Mrs Soha MRI showed   Two well circumscribed enhancing masses at the 9-00      location within the right breast, one of which shows interval increase      in size since the  preceding MRI dated 09/17/2010. These correspond to      solid masses shown on breast ultrasound. Evaluation with biopsy of the      enlarging mass is recommended. Biopsy of the immediately adjacent stable      mass may also be prudent since it would obviate the need for additional      imaging followup if the mass proved to be, as expected,      histopathologically benign. There are no findings suspicious for      malignancy within the left breast.    Her US 11-7-11 showed 2 nodules at 9:00. The first is stable on  2-21-11 at 1.1x1.0 cm. The second larger lesion has enlarged to a size of  1.6x1.3 cm up from 1.2 cm in 2-2011. Biopsy of the second lesion due to enlargment recommended.     04-27-12 Bilateral diagnostic mammogram [facility]  There are two metallic clips seen at the 9 o'clock position within the right breast biopsy-proven fibroadenoma. A metallic clip medial left breast as well as within the right breast at the 12 o'clock position.   Impression: There are no findings suspicious for malignancy in either breast.   BIRADS Category 2: Benign     11-12-12     Bilateral Breast MRI     [facility]    Kacey Willson  47 year-old female with right breast pain.  IMPRESSION:  There are no finding suspicious for malignancy in either breast.  No abnormality ot substantiate and/or explain the patient's right breast discomfort  Birads Category 1:  Negative    07-       [facility]    DIAGNOSTIC BILATERAL MAMMOGRAM                      KACEY WILLSON  HISTORY: Right lateral breast pain.  Moderately dense there is some mild skin retraction in the lateral aspect of the right breast.  IMPRESSION:  The ultrasound shows a hypoechoic nodule which may have represented a previously biopsied nodule and has relatively benign sonographic characteristics however short-term follow-up ultrasound of the right breast in approximately 3 months to 4 months recommended.  BIRADS 3    07-         Psychiatric              RIGHT BREAST ULTRASOUND                                ANAMIKA PEEWEE  9 o’clock to 10 o’clock right breast hypoechoic nodule measuring up to 1.6 cm x 1.1 cm x 5 mm. Patient had a nodule biopsied in this region on 11/14/2011 representing a fibroadenoma.  IMPRESSION:  Probably benign nodule in the 9 o’clock position of the right breast as described. Recommend follow-up right breast ultrasound 3 months to 4 months.      12-                [facility]     US                         RIGHT BREAST ULTRASOUND    PEEWEE WILLSON  HISTORY: Nonbloody right nipple discharge.  FINDINGS: Periareolar and retroareolar regions. 1 or 2 minimally prominent ducts are seen. No solid or cystic masses.    12-09-16                          DR MARION ZHU                   EXTERNAL RESULT SCAN               ROSA VIDES  Mammogram and breast ultrasound are normal. She was treated for mastitis.        May 18, 2017 -[facility]-bilateral breast MRI. Right breast 9:30 bowtie shaped metallic clip- not the ribbon shaped marker which is located more anterior and inferior and not the third marker that is more central and 12:00. from surrounding enhancement that measures 1.2 cm in greatest dimension. Recommend stereotactic guided biopsy of the clip and subsequent surrounding enhancement. No findings suspicious on the left.    5-24-17 [facility] RIGHT second look ultrasound- macrolobulated mass at 9:30 consistent with recurrent fibroadenoma (seen on MRI).  BI-RADS 3 recommend 6 month follow-up.    December 21, 2017 bilateral mammary with 3-D [facility]: Scattered fibroglandular densities.  BI-RADS 1.    Right breast ultrasound: [facility]: There is been no interval change in size of a lobulated nodule 9:30, 5 cm from the nipple.  There is a postbiopsy marker within it.  It measures 1.6 cm.  BI-RADS 3 recommend 6 month follow-up ultrasound.    [facility] breast MRI June 4,  2019: At the right breast 9:30, 5 cm from the nipple there is a metallic clip within a weakly enhancing mass measuring 1.6 cm consistent with fibroadenoma.  No findings suspicious in either breast BI-RADS 2.    [facility] June 5, 2018 right breast ultrasound: At 9:30, 5 cm from the nipple there is a 1.6 and meter lobulated mass slightly smaller than seen on the prior.  Clip is present.  Stable fibroadenoma.  BI-RADS 2.    12-19-19-[facility]  Screen 3d mammogram- scatt fg densities- BR1    5-7-20 [facility] mammogram screen with cam-  Scattered fg densities  BR1    Bilateral breast MRI [facility] December 23, 2019: No findings suspicious for malignancy in either breast BI-RADS 1    11/20/2021, MMG Screening Digital Cam Bilateral ([facility])  There are scattered areas of fibroglandular density.  BI-RADS 2: Benign    05/23/2022, MRI Breast Bilateral ([facility])  BI-RADS 1: Negative    12/17/2022, MMG Screening Digital Cam Bilateral ([facility])  BI-RADS 2: Benign    06/17/2023, MRI Breast Bilateral ([facility])  Left Breast: 12:00 in the posterior left breast, 8.4 cm posterior to the nipple, irregular enhancing mass, 1.1 cm. The visualized axilla is within normal limits. There are no pathologically enlarged internal mammary chain lymph nodes on either side.  No MRI evidence of malignancy in the right breast.  BI-RADS 4: Suspicious    06/22/2023, US Breast Left Limited ([facility])  12:00 o'clock 7 cm from the nipple 0.8 x 0.7 x 0.7 cm irregular hypoechoic mass.  BI-RADS 4: Suspicious    Pathology:  11-16-11- 2X RIGHT breast biopsies for breast masses enlarging -  9:00  FA and #2,  9:00, FCC and FA    Note from Dr. Bony Duarte dated January 30, 2019.  Patient is considering proceeding ahead with mastectomy reconstruction.  After consultation she is going to consider all of her options and we'll let him know if she wants to proceed.  Discussed subpectoral and prepectoral reconstruction.    07/17/2023, US Guided Breast Biopsy ([facility])  10-gauge  multiple tissue specimen bowtie shaped metallic clip placed. Bowtie shaped metallic clip in the posterior one third of the left breast at the 12:00 position.  The pathology is concordant.    PATHOLOGY REPORT  Breast, Left, 12 O'clock, 7 cm From Nipple, Biopsy: Invasive ductal adenocarcinoma and ductal carcinoma in-situ with Joanne Grade III (tubular grade 3, nuclear grade 3, mitotic grade 3 up to 5 mm.  No definitive perineural or lymphovascular invasion is identified. The ductal carcinoma in situ of intermediate to high nuclear grade with solid with cribriform architecture and focal necrosis.      ER Negative (less than 1%)    TX Negative (Less than 1%)    HER2 (Negative Score 0)    KI-67 60%        Procedures:    Assessment:   Diagnosis Plan   1. Malignant neoplasm of upper-outer quadrant of left breast in female, estrogen receptor negative        2. BRCA2 gene mutation positive        3. FH: breast cancer        4. FH: ovarian cancer        5. Fibrocystic breast changes, bilateral        6. Fibroadenosis of right breast             1-  LEFT breast 12:00, 8 CFN, not palpable, 1.1 cm on MRI, 1.3 cm on bedside ultrasound, 8mm on prebiopsy Denominational ultrasound and 5mm on core . Mammographically occult, interval cancer between MRI's  IMC, NST,high grade, 3,3,3, 5mm in core, no LVI, DCIS, int-high grade, solid and cribiform, [id number] 2 kaelyn 0, Ki 67 is 60%  CLinical stage T1bN0- IA         2-  3-21-17 [facility] BRCA1-2 analysis with cancer next- showed a BRCA2 mutation [id number].    3-4  Mother age 56, 1/5 MA age 73  PGM breast age 57, ovarian age uncertain    5-  1-2009- core biopsy [facility]- barbed or not shaped marker was left at the 12:00 location and the pathology returned as cystic apically metaplasia with dense stromal fibrosis and was felt concordant    11-16-11- 2X RIGHT breast biopsies for breast masses enlarging -  9:00  FA and #2,  9:00, FCC and FA- bowtie clip was posterior-superior in the ribbon clip was  anterior inferior both at 9:00.        6-  RIGHT MRI-area of enhancement at 9:00 had increased T2 signal- second look ultrasound BR3 for recurring FA 5-2017- BR3 on US , due 6-2018- RIGHT US 6-2018- stable 1.6 cm mass BR2 c/w FA        Plan:  Kacey is doing well today. SHe is here with her significant other Stefan. THey are looking at engagement rings and he is a primary support for her.    We reviewed her interval history, imaging, imaging reports, pathology reports, and exam.    We reviewed the difference in systemic therapy versus locoregional. She knows that she will be seeing a medical oncologist in the adjuvant setting.  I spoke with Dr Matthews today about her case.     We discussed that for early stage breast cancer, there are 2 options for locoregional treatment that have equivalent survival: total mastectomy versus lumpectomy and radiation, either with sentinel node biopsy. She understands the above, and has decided based on her RBCA2 mutation, that she would like to proceed with a bilateral total mastectomy, ipsilateral sentinel node biopsy and immediate reconstruction.   Dr Matthesw would like a port placed.      We will plan for a Bilateral total mastectomy, LEFT axilla sentinel node biopsy, possible LEFT axilla node dissection, RIGHT port placement and possible reconstruction.      She understands that there is no survival benefit for this procedure over a unilateral mastectomy or a lumpectomy, and that the rationale is to prevent a second breast cancer. She understands the risks of mastectomy including bleeding, infection, seroma and chance of skin ischemia/necrosis. She understands the risks of sentinel node biopsy, including 7% chance of lymphedema, injury to nerves or vessels in the axilla, seroma. We discussed that we will proceed with a frozen section analysis of the sentinel node in the operating room.    We discussed her having a plastic surgical consultation in the preoperative period, and  have arranged that today.   NCCN guidelines have been followed.  We gave her EMLA cream and tegederm for her sentinel node procedure, and arranged for her to see our nurse navigator.   She will receive a recommendation about radiation after surgery.  I am hopeful that she will not need this.    We discussed risks of port including bleeding, infection, thrombus, malfunction, pneumothorax. SHe wishes to proceed.      She has seen genetics in the past for her mutation.  SHe has seen medical oncology in the past for the mutation.  SHe was being seen by Kayy Stahl, who dismissed her in the fall for not proceeding with planned TAHBSO. SHe needs a new gynecologist, so I will arrange this for her so that she can have her BSO down the road.    Her psychiatrist is Dr. Linda Tesfaye, and she is in good contact with her. She is aware of her new diagnosis.    She cannot tolerate hydrocodone but does ok with oxycodone.    Will get a baseline bioimpedence on her arms preoperatively.    Anna Duenas MD    Today I spent 60 minutes doing the following: Reviewing records, labs, outside imaging and reports in preparation for the patient visit; obtaining medical history; performing the physical exam; counseling and educating the patient and any available family or caregivers; ordering medications, tests or procedures; coordinating care with any other physicians on her care team as needed, and documenting all of the above in the medical record as well as sending communications with her other healthcare professionals.     Next Appointment:  Return for surgery.     EMR Dragon/transcription disclaimer:    Much of this encounter note is an electronic transcription/translocation of spoken language to printed text.  The electronic translation of spoken language may permit erroneous, or at times, nonsensical words or phrases to be inadvertently transcribed.  Although I have reviewed the note from such areas, some may still  exist.

## 2023-07-26 DIAGNOSIS — Z17.1 MALIGNANT NEOPLASM OF UPPER-OUTER QUADRANT OF LEFT BREAST IN FEMALE, ESTROGEN RECEPTOR NEGATIVE: Primary | ICD-10-CM

## 2023-07-26 DIAGNOSIS — C50.412 MALIGNANT NEOPLASM OF UPPER-OUTER QUADRANT OF LEFT BREAST IN FEMALE, ESTROGEN RECEPTOR NEGATIVE: Primary | ICD-10-CM

## 2023-07-27 ENCOUNTER — PATIENT OUTREACH (OUTPATIENT)
Dept: OTHER | Facility: HOSPITAL | Age: 58
End: 2023-07-27
Payer: COMMERCIAL

## 2023-07-27 NOTE — PROGRESS NOTES
Referral received from Dr. Duenas's office. I called Ms. Garcia and introduced myself and navigational services. She stated the consult with Dr. Duenas went well.  She has a good understanding of her pathology and treatment options. She was able to verbalize teach back on her plan of care.      She stated she has a wonderful support system with her fiance, friends and family. She stated she feels comfortable talking to them about needs or issues.      She stated she has no financial or transportation concerns at this time. She has no resource needs or ongoing concerns at this time.      She stated she has been anxious about her diagnosis and we discussed that can be normal. She stated she has a therapist she talks to weekly and a psychiatrist she sees regularly. We discussed [facility] and our support options. She was thankful for the information.      We discussed integrative therapies and other services at [facility]. She will received a navigation folder with the following information in the mail:     [facility], Cancer and Restorative Exercise (CARE), [employer] Exercise program, Guide for the Newly Diagnosed, Bioimpedance, [facility], Massage Therapy, Reiki Therapy, [employer], Cancer Nutrition, and Survivorship Clinic.     She verbalized appreciation for navigational services and she has my contact information and will call with any questions that arise.

## 2023-07-28 ENCOUNTER — TELEPHONE (OUTPATIENT)
Dept: SURGERY | Facility: CLINIC | Age: 58
End: 2023-07-28
Payer: COMMERCIAL

## 2023-07-28 ENCOUNTER — TELEPHONE (OUTPATIENT)
Dept: MAMMOGRAPHY | Facility: HOSPITAL | Age: 58
End: 2023-07-28
Payer: COMMERCIAL

## 2023-07-28 ENCOUNTER — DOCUMENTATION (OUTPATIENT)
Dept: MAMMOGRAPHY | Facility: HOSPITAL | Age: 58
End: 2023-07-28
Payer: COMMERCIAL

## 2023-07-28 NOTE — TELEPHONE ENCOUNTER
----- Message from Meron Kim sent at 7/28/2023  8:21 AM EDT -----  Contact: [phone]  Patient called stating that she has a rash on both breasts and then where the biopsy was done theres yellow stuff oozing out please call back    7/17/23 left U/S guided breast biopsy in [facility] Radiology Dept.   Reporting rash both breast, yellowish discharge from mammotomy site, no bleeding, no fever, no redness.   Pt has told me rash present x 3 days and very itchy.

## 2023-07-28 NOTE — TELEPHONE ENCOUNTER
Patient arrived in Mammography Department to have biopsy site from 7/17/23 assessed. Patient presents with pink bumpy rash over both breasts, which she complains is itchy and came up about 3 days ago. Patient did recently switch her laundry detergent, which she questions was the cause of the rash. Biopsy site itself is irritated around the site but negative for purulent drainage, warmth, and no fever present. Granulation tissue present. Patient does state rash is feeling better since glue was removed. Dr. Castrejon assessed site as well and denies infection being present after assessment. Dr. Castrejon applied sterile gloves and cleansed area with hydrogen peroxide. Dr. Castrejon applied steri strips to help close the wound. Dr. Castrejon educated patient the nature of granulation tissue and explained the irritation of the skin could be a number of things. Dr. Castrejon educated patient to continue assessment of site and if s/s infection occur, contact ordering provider or PCP. This RN reinforced this education and lastly applied 2x2 gauze and paper tape over area to keep it covered. Patient understands to continue assessment of site and when to seek medical attention if s/s infection or rash becomes worse. Patient v/u and appreciated care.

## 2023-07-28 NOTE — PROGRESS NOTES
Contacted by Lorna in Dr. Duenas's office. She reported that patient had called regarding biopsy site concerns. This nurse placed a call to patient who stated that she has had a rash on both breasts x3 days, the glue was still in place so she pulled it off. She reported that when she pulled off the glue, there was pus that leaked out of biopsy site. She is unsure if she has had any fever. She placed a Bandaid over site. She is concerned it is infected. Encouraged her to come in and let us look at site. She will come by today for site to be evaluated by this nurse.

## 2023-07-28 NOTE — PROGRESS NOTES
[facility]     Progress Note    Patient Name: Kacey Garcia  : 1965  MRN: [id number]  Primary Care Physician:  Cecile Garza MD  Date of admission: (Not on file)    Subjective   Subjective     Chief Complaint: ***    History of Present Illness  Contacted by Lorna in Dr. Duenas's office. She reported that patient had called regarding biopsy site concerns. This nurse placed a call to patient who stated that she has had a rash on both breasts x3 days, the glue was still in place so she pulled it off. She reported that when she pulled off the glue, there was pus that leaked out of biopsy site. She is unsure if she has had any fever. She placed a Bandaid over site. She is concerned it is infected. Encouraged her to come in and let us look at site. She will come by today for site to be evaluated by this nurse.  Patient Reports ***    Review of Systems    Objective   Objective     Vitals:   BP: ()/()   Arterial Line BP: ()/()     Physical Exam     Result Review    [unfilled]    Assessment & Plan   Assessment / Plan     Brief Patient Summary:  Kacey Garcia is a 57 y.o. female who ***    Active Hospital Problems:  There are no active hospital problems to display for this patient.    Plan:       DVT prophylaxis:  No DVT prophylaxis order currently exists.    CODE STATUS:       Disposition:  I expect patient to be discharged ***.    Lin Chatman RN

## 2023-07-31 ENCOUNTER — TELEPHONE (OUTPATIENT)
Dept: SURGERY | Facility: CLINIC | Age: 58
End: 2023-07-31
Payer: COMMERCIAL

## 2023-08-01 ENCOUNTER — TELEPHONE (OUTPATIENT)
Dept: SURGERY | Facility: CLINIC | Age: 58
End: 2023-08-01
Payer: COMMERCIAL

## 2023-08-14 ENCOUNTER — HOSPITAL ENCOUNTER (OUTPATIENT)
Dept: OCCUPATIONAL THERAPY | Facility: HOSPITAL | Age: 58
Setting detail: THERAPIES SERIES
Discharge: HOME | End: 2023-08-14
Payer: COMMERCIAL

## 2023-08-14 DIAGNOSIS — C50.912 MALIGNANT NEOPLASM OF LEFT FEMALE BREAST, UNSPECIFIED ESTROGEN RECEPTOR STATUS, UNSPECIFIED SITE OF BREAST: ICD-10-CM

## 2023-08-14 DIAGNOSIS — N63.0 BREAST SWELLING: ICD-10-CM

## 2023-08-14 DIAGNOSIS — N64.4 BREAST PAIN: ICD-10-CM

## 2023-08-14 DIAGNOSIS — Z01.818 OTHER SPECIFIED PRE-OPERATIVE EXAMINATION: Primary | ICD-10-CM

## 2023-08-14 PROCEDURE — 97165 OT EVAL LOW COMPLEX 30 MIN: CPT

## 2023-08-14 PROCEDURE — 93702 BIS XTRACELL FLUID ANALYSIS: CPT

## 2023-08-14 NOTE — THERAPY EVALUATION
Outpatient Occupational Therapy Lymphedema Initial Evaluation  Psychiatric     Patient Name: Kacey Garcia  : 1965  MRN: [id number]  Today's Date: 2023      Visit Date: 2023    Patient Active Problem List   Diagnosis    Anemia    Gastroesophageal reflux disease    Herniation of nucleus pulposus    Pain in extremity    Low back pain    Palpitations    Vitamin D deficiency    Nipple discharge    FH: breast cancer    Fibroadenosis of breast    Diffuse cystic mastopathy    BRCA2 gene mutation positive    Abnormal MRI, breast    Abnormal ultrasound of breast    FH: ovarian cancer    Fibroadenoma of breast    Epigastric pain    Biliary dyskinesia    Rectal bleeding    Asthma    Seasonal allergies    Fibroadenoma of right breast    DDD (degenerative disc disease), lumbar    Herniation of lumbar intervertebral disc with radiculopathy    Fibrocystic breast changes, bilateral    Increased risk of breast cancer        Past Medical History:   Diagnosis Date    Anxiety     Asthma     Basal cell carcinoma     RIGHT FACE, LEFT BREAST    BRCA gene positive     BRCA2    Chronic sinusitis     Constipation     WITH RECTAL BLEEDING PER DR TROY'S OFFICE NOTE    DDD (degenerative disc disease), cervical     Fibrocystic breast     GERD (gastroesophageal reflux disease)     Hashimoto's thyroiditis     History of anemia     History of migraine     History of prior pregnancies     x2    Irregular heart beat     TACHYCARDIA, SAW DR. KELLEY IN .  NO FOLLOW UP REQUIRED    PONV (postoperative nausea and vomiting)     S/P skin biopsy     R EAR, R BREAST, LOWER LEFT LEG, L THIGN    Spinal headache     Thyroid nodule     WITH GOITER    TMJ (dislocation of temporomandibular joint)     Varicose vein of leg     Vitamin D deficiency         Past Surgical History:   Procedure Laterality Date    BASAL CELL CARCINOMA EXCISION Right     Face. RIGHT BREAST    BREAST BIOPSY Left     Benign    BREAST BIOPSY Right 2009     Complex cystic lesion     SECTION      CHOLECYSTECTOMY WITH INTRAOPERATIVE CHOLANGIOGRAM N/A 2017    Procedure: CHOLECYSTECTOMY LAPAROSCOPIC INTRAOPERATIVE CHOLANGIOGRAM;  Surgeon: Tesfaye Daigle Jr., MD;  [facility] OR;  Service:     DILATATION AND CURETTAGE      LUMBAR DISC SURGERY      SINUS SURGERY      WISDOM TOOTH EXTRACTION           Visit Dx:     ICD-10-CM ICD-9-CM   1. Other specified pre-operative examination  Z01.818 V72.83   2. Breast swelling  N63.0 611.72   3. Breast pain  N64.4 611.71   4. Malignant neoplasm of left female breast, unspecified estrogen receptor status, unspecified site of breast  C50.912 174.9        Patient History       Row Name 23 1500             History    Chief Complaint Other 1 (comment)  Preop evaluation  -RE      Date Current Problem(s) Began 23  Referral date  -RE      Brief Description of Current Complaint Patient presents for preoperative evaluation prior to surgery.  Patient is scheduled for bilateral mastectomy and a left sentinel lymph node biopsy.  She complains of left breast edema which has persisted since her biopsy.  -RE      Patient/Caregiver Goals Know what to do to help the symptoms;Return to prior level of function  -RE      Hand Dominance right-handed  -RE      Are you or can you be pregnant No  -RE         Pain     Pain Location Breast  -RE      Pain at Worst 2  -RE      Pain Comments describes as uncomfortable  -RE         Fall Risk Assessment    Any falls in the past year: No  -RE         Services    Are you currently receiving Home Health services No  -RE         Daily Activities    Primary Language English  -RE      Are you able to read Yes  -RE      Are you able to write Yes  -RE      How does patient learn best? Demonstration  -RE      Teaching needs identified Home Exercise Program;Management of Condition  -RE      Patient is concerned about/has problems with Performing home management (household chores, shopping,  care of dependents);Difficulty with self care (i.e. bathing, dressing, toileting:;Performing job responsibilities/community activities (work, school,  -RE      Barriers to learning None  -RE      Explanation of Functional Status Problem Patient is independent with ADLs and household tasks however she does report poor that left arm use promotes breast swelling.  -RE      Pt Participated in POC and Goals Yes  -RE         Safety    Are you being hurt, hit, or frightened by anyone at home or in your life? No  -RE      Are you being neglected by a caregiver No  -RE                User Key  (r) = Recorded By, (t) = Taken By, (c) = Cosigned By      Initials Name Provider Type    RE Zoraida Rodriguez OTR Occupational Therapist                     Lymphedema       Row Name 08/14/23 1500             Subjective Pain    Able to rate subjective pain? yes  -RE      Pre-Treatment Pain Level 0  -RE      Post-Treatment Pain Level 0  -RE      Subjective Pain Comment Patient has no current pain but reports pain of 2 out of 10 within the last 24 hours in her breast area.  -RE         Subjective Comments    Subjective Comments Left breast is uncomfortable  -RE         Lymphedema Assessment    Lymphedema Classification LUE:;at risk/stage 0  -RE      Lymphedema Cancer Related Sx bilateral;simple mastectomy;left;sentinel node biopsy  -RE         Posture/Observations    Posture- WNL Posture is WNL  -RE         General ROM    GENERAL ROM COMMENTS Bilateral upper extremity active range of motion is within normal limits  -RE         L-Dex Bioimpedence Screening    L-Dex Measurement Extremity LUE  -RE      L-Dex Patient Position Standing  -RE      L-Dex UE Dominate Side Right  -RE      L-Dex UE At Risk Side Left  -RE      L-Dex UE Pre Surgical Value Yes  -RE      L-Dex UE Score -0.1  -RE      L-Dex UE Baseline Score -0.1  -RE      L-Dex UE Value Change 0  -RE      L-Dex UE Comment WNL  -RE                User Key  (r) = Recorded By, (t) = Taken  "By, (c) = Cosigned By      Initials Name Provider Type    RE Zoraida Rodriguez, OTR Occupational Therapist                    The patient had a baseline SOZO measurement which I reviewed today. The score is WNL, see in EMR. Bioimpedance spectroscopy helps identify the onset of lymphedema in an arm or leg before patients experience noticeable swelling. Research has shown that the early detection of lymphedema using L-Dex combined with treatment can reduce progression to chronic lymphedema by 95% in breast cancer patients. Whenever possible, patients are tested for baseline L-Dex score before cancer treatment begins and then are reassessed during regular follow-up visits using the SOZO device. Otherwise, this can be started postoperatively and continued during regular follow-up visits. If the patient's L-Dex score increases above normal levels, that is a sign that lymphedema is developing and a referral is made to occupational therapy for further evaluation and early compression treatment. Lymphedema assessment with the SOZO L-Dex score is recommended to be done every 3 months for the first 3 years and then every 6 months for years 4 and 5 followed by annually afterwards.        Therapy Education  Education Details: Explained purpose of bioimpedance testing including recommended frequency discussed patient's L-Dex value.  Explained the purpose of preoperative bioimpedance testing.  I recommended that patient return for repeat testing approximately 4 to 6 weeks postoperatively.  I provided her with \"Healthy Habits for Patients at Risk for Lymphedema\" and reviewed it with her.  Given: Edema management, Symptoms/condition management, Other (comment)  Program: New  How Provided: Verbal  Provided to: Patient  Level of Understanding: Teach back education performed, Verbalized         OT Goals       Row Name 08/14/23 1500          OT Short Term Goals    STG Date to Achieve 09/14/23  -RE     STG 1 Pt will demonstrate " "understanding of use of compression sleeve for edema prevention, exercise and air travel.  -RE     STG 1 Progress New  -RE     STG 2 Patient will demonstrate proper awareness of "What is Lymphedema?" and "Healthy Habits" for improved prevention, management, care of symptoms and ease of transition to self-care of condition.  -RE     STG 2 Progress New  -RE        Long Term Goals    LTG Date to Achieve 11/14/23  -RE     LTG 1 Patient independent and compliant with initial home exercise program focused on gentle AROM and stretching to improve AROM to pre-surgical level.  -RE     LTG 1 Progress New  -RE     LTG 2 Patient will participate in bioimpedance scans postoperatively and then every 3 months as a method of early detection of lymphedema to allow for early intervention.  -RE     LTG 2 Progress New  -RE     LTG 3 Patient's bioimpedance score to remain below 6.4 for decreased risk of stage II lymphedema.  -RE     LTG 3 Progress New  -RE        Time Calculation    OT Goal Re-Cert Due Date 11/14/23  -RE               User Key  (r) = Recorded By, (t) = Taken By, (c) = Cosigned By      Initials Name Provider Type    Zoraida Montenegro OTR Occupational Therapist                     OT Assessment/Plan       Row Name 08/14/23 1807          OT Assessment    Impairments Impaired lymphatic circulation  -RE     Assessment Comments Patient presents to Occupational Therapy preoperatively for planned breast cancer surgery to include bilateral mastectomy and planned left sentinel lymph node biopsy with no reconstruction on 8/16/23.  Baseline range of motion and bioimpedance measurements were taken today to be compared to measurements retaken postoperatively.  At that time any reduced movement, decline in function or postural issues will be addressed with skilled therapy and new goals will be established.  Personal risk factors for lymphedema postoperatively for the left upper extremity and trunk quadrant were also assessed today and " basic lymphedema precautions were discussed.  A more detailed discussion regarding personal risk factors will take place postoperatively once the number of nodes removed and the plan for further medical care is known.  -RE     Please refer to paper survey for additional self-reported information No  -RE     OT Diagnosis At risk for lymphedema  -RE     OT Rehab Potential Good  -RE     Patient/caregiver participated in establishment of treatment plan and goals Yes  -RE     Patient would benefit from skilled therapy intervention Yes  -RE        OT Plan    OT Frequency Other (comment)  -RE     Predicted Duration of Therapy Intervention (OT) Bioimpedance 4 to 6 weeks postoperatively and then every 3 months  -RE     Planned CPT's? OT EVAL LOW COMPLEXITY: 70108;OT THER PROC EA 15 MIN: [id number];OT SELF CARE/MGMT/TRAIN 15 MIN: 01225;OT MANUAL THERAPY EA 15 MIN: 05503;OT BIS XTRACELL FLUID ANALYSIS: 61936  -RE     OT Plan Comments Follow-up postoperatively as ordered or 4 to 6 weeks postop  -RE               User Key  (r) = Recorded By, (t) = Taken By, (c) = Cosigned By      Initials Name Provider Type    RE Zoraida Rodriguez OTR Occupational Therapist                    Outcome Measure Options: Quick DASH  Quick DASH  Open a tight or new jar.: Mild Difficulty  Do heavy household chores (e.g., wash walls, wash floors): Mild Difficulty  Carry a shopping bag or briefcase: Mild Difficulty  Wash your back: No Difficulty  Use a knife to cut food: Mild Difficulty  Recreational activities in which you take some force or impact through your arm, should or hand (e.g. golf, hammering, tennis, etc.): Unable  During the past week, to what extent has your arm, shoulder, or hand problem interfered with your normal social activites with family, friends, neighbors or groups?: Slightly  During the past week, were you limited in your work or other regular daily activities as a result of your arm, shoulder or hand problem?: Slightly Limited  Arm,  Shoulder, or hand pain: None  Tingling (pins and needles) in your arm, shoulder, or hand: None  During the past week, how much difficulty have you had sleeping because of the pain in your arm, shoulder or hand?: Mild Difficulty  Quick Dash Comments: 25% disability  Number of Questions Answered: 11  Quick DASH Score: 25         Time Calculation:   OT Start Time: 1455  OT Stop Time: 1530  OT Time Calculation (min): 35 min  Untimed Charges  OT Eval/Re-eval Minutes: 25  98337 - OT Bioimpedence Rx Minutes: 10  Total Minutes  Untimed Charges Total Minutes: 35   Total Minutes: 35     Therapy Charges for Today       Code Description Service Date Service Provider Modifiers Qty    35811007217 HC OT BIS XTRACELL FLUID ANALYSIS 8/14/2023 Zoraida Rodriguez OTR GO 1    03082962044 HC OT EVAL LOW COMPLEXITY 2 8/14/2023 Zoraida Rodriguez OTR GO 1                      AKOSUA Castro  8/14/2023

## 2023-09-13 ENCOUNTER — PATIENT OUTREACH (OUTPATIENT)
Dept: OTHER | Facility: HOSPITAL | Age: 58
End: 2023-09-13
Payer: COMMERCIAL

## 2023-09-13 NOTE — PROGRESS NOTES
Called Ms. Garcia to see how she was doing. She stated she had her port placed today and is recovering. Had double mastectomy at [facility] but plans to do some things at [facility], [place], operated by [facility]. She needed some paperwork filled out from Dr. Duenas and I gave her their office number. Otherwise, she has needs at this time. She was thankful for the call and will reach out if any questions or needs arise.

## 2023-09-21 ENCOUNTER — HOSPITAL ENCOUNTER (OUTPATIENT)
Dept: OCCUPATIONAL THERAPY | Facility: HOSPITAL | Age: 58
Setting detail: THERAPIES SERIES
Discharge: HOME | End: 2023-09-21
Payer: COMMERCIAL

## 2023-09-21 DIAGNOSIS — Z91.89 AT RISK FOR LYMPHEDEMA: ICD-10-CM

## 2023-09-21 DIAGNOSIS — C50.912 MALIGNANT NEOPLASM OF LEFT FEMALE BREAST, UNSPECIFIED ESTROGEN RECEPTOR STATUS, UNSPECIFIED SITE OF BREAST: Primary | ICD-10-CM

## 2023-09-21 PROCEDURE — 97535 SELF CARE MNGMENT TRAINING: CPT

## 2023-09-21 PROCEDURE — 97168 OT RE-EVAL EST PLAN CARE: CPT

## 2023-09-21 PROCEDURE — 93702 BIS XTRACELL FLUID ANALYSIS: CPT

## 2023-09-21 NOTE — THERAPY RE-EVALUATION
Outpatient Occupational Therapy Lymphedema Re-Evaluation  [facility]     Patient Name: Kacey Garcia  : 1965  MRN: [id number]  Today's Date: 2023      Visit Date: 2023    Patient Active Problem List   Diagnosis    Anemia    Gastroesophageal reflux disease    Herniation of nucleus pulposus    Pain in extremity    Low back pain    Palpitations    Vitamin D deficiency    Nipple discharge    FH: breast cancer    Fibroadenosis of breast    Diffuse cystic mastopathy    BRCA2 gene mutation positive    Abnormal MRI, breast    Abnormal ultrasound of breast    FH: ovarian cancer    Fibroadenoma of breast    Epigastric pain    Biliary dyskinesia    Rectal bleeding    Asthma    Seasonal allergies    Fibroadenoma of right breast    DDD (degenerative disc disease), lumbar    Herniation of lumbar intervertebral disc with radiculopathy    Fibrocystic breast changes, bilateral    Increased risk of breast cancer        Past Medical History:   Diagnosis Date    Anxiety     Asthma     Basal cell carcinoma     RIGHT FACE, LEFT BREAST    BRCA gene positive     BRCA2    Chronic sinusitis     Constipation     WITH RECTAL BLEEDING PER DR TROY'S OFFICE NOTE    DDD (degenerative disc disease), cervical     Fibrocystic breast     GERD (gastroesophageal reflux disease)     Hashimoto's thyroiditis     History of anemia     History of migraine     History of prior pregnancies     x2    Irregular heart beat     TACHYCARDIA, SAW DR. KELLEY IN .  NO FOLLOW UP REQUIRED    PONV (postoperative nausea and vomiting)     S/P skin biopsy     R EAR, R BREAST, LOWER LEFT LEG, L THIGN    Spinal headache     Thyroid nodule     WITH GOITER    TMJ (dislocation of temporomandibular joint)     Varicose vein of leg     Vitamin D deficiency         Past Surgical History:   Procedure Laterality Date    BASAL CELL CARCINOMA EXCISION Right     Face. RIGHT BREAST    BREAST BIOPSY Left     Benign    BREAST BIOPSY Right 2009     Complex cystic lesion     SECTION      CHOLECYSTECTOMY WITH INTRAOPERATIVE CHOLANGIOGRAM N/A 2017    Procedure: CHOLECYSTECTOMY LAPAROSCOPIC INTRAOPERATIVE CHOLANGIOGRAM;  Surgeon: Tesfaye Daigle Jr., MD;  Location: [facility];  Service:     DILATATION AND CURETTAGE      LUMBAR DISC SURGERY      SINUS SURGERY      WISDOM TOOTH EXTRACTION           Visit Dx:     ICD-10-CM ICD-9-CM   1. Malignant neoplasm of left female breast, unspecified estrogen receptor status, unspecified site of breast  C50.912 174.9   2. At risk for lymphedema  Z91.89 V49.89            Lymphedema       Row Name 23 1300             Subjective Pain    Able to rate subjective pain? yes  -RE      Pre-Treatment Pain Level 0  -RE      Post-Treatment Pain Level 0  -RE         Subjective    Subjective Comments Patient returns for postoperative evaluation.  Patient had a bilateral simple mastectomy on 2023 with a left sentinel lymph node biopsy with 0 of 3 lymph nodes positive.  She reports a postoperative staph infection that required hospitalization for 4 days.  Her drains were removed on 2023.  She has no complaints of significant pain.  She is concerned about recovering all of her arm function.  She also complains of numbness and a feeling of fullness in her upper arm on the left side.  She is anxious to begin a home exercise program and to return to normal activity.  -RE         Lymphedema Assessment    Lymphedema Classification LUE:;at risk/stage 0  -RE      Lymphedema Cancer Related Sx bilateral;simple mastectomy;left;sentinel node biopsy  -RE      Lymphedema Surgery Comments 2023  -RE      Lymph Nodes Removed # 3  -RE      Positive Lymph Nodes # 0  -RE      Chemo Received yes  -RE      Radiation Therapy Received no  -RE      Infections or Cellulitis? yes  -RE      Infection/Cellulitis Treatment IV antibiotics; post op infection on the left side  -RE         General ROM    RT Upper Ext Rt Shoulder  ABduction;Rt Shoulder Flexion;Rt Shoulder Internal Rotation  -RE      LT Upper Ext Lt Shoulder ABduction;Lt Shoulder Flexion;Lt Shoulder Internal Rotation  -RE         Right Upper Ext    Rt Shoulder Abduction AROM 90  -RE      Rt Shoulder Flexion AROM 135  -RE      Rt Shoulder Internal Rotation AROM FIR T7  -RE         Left Upper Ext    Lt Shoulder Abduction AROM 90  -RE      Lt Shoulder Flexion AROM 130  -RE      Lt Shoulder Internal Rotation AROM FIR to T7  -RE         Skin Changes/Observations    Skin Observations Comment Cording in B Axillae L>R  -RE         L-Dex Bioimpedence Screening    L-Dex Measurement Extremity LUE  -RE      L-Dex Patient Position Standing  -RE      L-Dex UE Dominate Side Right  -RE      L-Dex UE At Risk Side Left  -RE      L-Dex UE Pre Surgical Value Yes  -RE      L-Dex UE Score -1.5  -RE      L-Dex UE Baseline Score -0.1  -RE      L-Dex UE Value Change -1.4  -RE      L-Dex UE Comment WNL  -RE                User Key  (r) = Recorded By, (t) = Taken By, (c) = Cosigned By      Initials Name Provider Type    Zoraida Montenegro OTR Occupational Therapist                  The patient had a postoperative baseline SOZO measurement which I reviewed today. The score is within normal limits  see scanned to EMR. Bioimpedance spectroscopy helps identify the   onset of lymphedema in an arm or leg before patients experience noticeable swelling. Research has   shown that 92% of patients with early detection of lymphedema using L-Dex combined with   intervention do not progress to chronic lymphedema through three years. Additionally, as of March 2023, the NCCN Guidelines® for Survivorship recommend proactive screening for lymphedema using   bioimpedance spectroscopy. Whenever possible, patients are tested for baseline L-Dex score before   cancer treatment begins and then are reassessed during regular follow-up visits using the SOZO device.   Otherwise, this can be started postoperatively and continued  "during regular follow-up visits. If the   patient’s L-Dex score increases above normal levels, that is a sign that lymphedema is developing and a   referral is made to occupational therapy for further evaluation and early compression treatment.   Lymphedema assessment with the SOZO L-Dex score is recommended to be done every 3 months for   the first 3 years and then every 6 months for years 4 and 5 followed by annually afterwards          Therapy Education  Education Details: Provided patient with \"Healthy Habits for Patients at Risk for Lymphedema\".  I reviewed the purpose of bioimpedance testing including recommended frequency.  We discussed patient's current L-Dex value of -1.5.  I provided her with a home exercise program to address her limited range of motion and cording.  Access Code: [email or web address]  URL: [email or web address]  Date: 09/21/2023  Prepared by: Zoraida Rodriguez    Exercises  - Shoulder External Rotation and Scapular Retraction  - 1 x daily - 7 x weekly - 3 sets - 10 reps  - Supine Chest Stretch with Elbows Bent  - 1 x daily - 7 x weekly - 3 sets - 10 reps  - Supine Shoulder Flexion Extension Full Range AROM  - 1 x daily - 7 x weekly - 3 sets - 10 reps  - Shoulder Rolls in Sitting  - 1 x daily - 7 x weekly - 3 sets - 10 reps  - Supine Shoulder Abduction AROM  - 1 x daily - 7 x weekly - 3 sets - 10 reps  - Shoulder Scaption Wall Slide with Towel  - 1 x daily - 7 x weekly - 3 sets - 10 reps  - Shoulder Flexion Wall Slide with Towel  - 1 x daily - 7 x weekly - 3 sets - 10 reps  Given: Symptoms/condition management, Other (comment), HEP  Program: New  How Provided: Verbal, Written  Provided to: Patient  Level of Understanding: Teach back education performed, Verbalized  48969 - OT Self Care/Mgmt Minutes: 30         OT Goals       Row Name 09/21/23 1400          OT Short Term Goals    STG Date to Achieve 10/21/23  -RE     STG 1 Pt will demonstrate understanding of use of compression sleeve for " edema prevention, exercise and air travel.  -RE     STG 1 Progress Ongoing  -RE     STG 2 Patient will demonstrate proper awareness of “What is Lymphedema?” and “Healthy Habits” for improved prevention, management, care of symptoms and ease of transition to self-care of condition.  -RE     STG 2 Progress Ongoing  -RE     STG 3 Pt will demonstrate understanding of use of compression sleeve for edema prevention, exercise and air travel.  -RE     STG 3 Progress New  -RE        Long Term Goals    LTG Date to Achieve 12/21/23  -RE     LTG 1 Patient independent and compliant with initial home exercise program focused on gentle AROM and stretching to improve AROM to pre-surgical level.  -RE     LTG 1 Progress Ongoing  -RE     LTG 2 Patient will participate in bioimpedance scans postoperatively and then every 3 months as a method of early detection of lymphedema to allow for early intervention.  -RE     LTG 2 Progress Met;Ongoing  -RE     LTG 3 Patient's bioimpedance score to remain below 6.4 for decreased risk of stage II lymphedema.  -RE     LTG 3 Progress Met;Ongoing  -RE     LTG 4 Patient to demonstrate increased bilateral shoulder flexion to 160 to improve functional UE use and to restore pre operative AROM per patient perception.  -RE     LTG 4 Progress New  -RE     LTG 5 Patient to demonstrate increased bilateral shoulder abduction to 160 to improve functional UE use and to restore preoperative AROM per patient perception.  -RE     LTG 5 Progress New  -RE        Time Calculation    OT Goal Re-Cert Due Date 12/21/23  -RE               User Key  (r) = Recorded By, (t) = Taken By, (c) = Cosigned By      Initials Name Provider Type    Zoraida Montenegro OTR Occupational Therapist                     OT Assessment/Plan       Row Name 09/21/23 9025          OT Assessment    Impairments Impaired lymphatic circulation;Range of motion  -RE     Assessment Comments Patient is a 57-year-old female recently diagnosed with left  breast cancer.  She presents to therapy and evolving condition status post bilateral simple mastectomy with left sentinel lymph node biopsy.  She did not have reconstruction.  Patient is at increased risk of post mastectomy lymphedema in her left upper extremity due to lymph node removal, breast surgery, history of infection, and previous history of breast edema post biopsy.  She presents with postoperative decreased range of motion, flexibility, strength, and function.  She has no signs or symptoms of lymphedema however she does have bilateral axillary cording.  We did complete a baseline postoperative bioimpedance assessment with a current L-Dex score of -1.5 which is within normal limits and consistent with her preoperative baseline.  She will benefit from skilled formal breast care Occupational Therapy at this time to address listed dysfunctions and to follow-up for lymphedema prevention and surveillance.  -RE     Please refer to paper survey for additional self-reported information No  -RE     OT Diagnosis Bilateral impaired shoulder range of motion; at risk for lymphedema left upper extremity  -RE     OT Rehab Potential Good  -RE     Patient/caregiver participated in establishment of treatment plan and goals Yes  -RE     Patient would benefit from skilled therapy intervention Yes  -RE        OT Plan    OT Frequency Other (comment)  -RE     Predicted Duration of Therapy Intervention (OT) Follow-up for range of motion reassessment in 2 weeks if cording is not improved I would recommend 1-2 times a week for 2 to 4 weeks; follow-up for bioimpedance every 3 months  -RE     Planned CPT's? OT RE-EVAL: 35173;OT THER PROC EA 15 MIN: 03214QK;OT SELF CARE/MGMT/TRAIN 15 MIN: 90008;OT MANUAL THERAPY EA 15 MIN: 54615;OT BIS XTRACELL FLUID ANALYSIS: 37857  -RE     OT Plan Comments Follow-up for range of motion reassessment in 2 weeks and bioimpedance in 3 months  -RE               User Key  (r) = Recorded By, (t) = Taken By,  (c) = Cosigned By      Initials Name Provider Type    Zoraida Montenegro OTR Occupational Therapist                              Time Calculation:   OT Start Time: 1310  OT Stop Time: 1415  OT Time Calculation (min): 65 min  Timed Charges  19178 - OT Self Care/Mgmt Minutes: 30  Untimed Charges  OT Eval/Re-eval Minutes: 25  03192 - OT Bioimpedence Rx Minutes: 10  Total Minutes  Timed Charges Total Minutes: 30  Untimed Charges Total Minutes: 35   Total Minutes: 65     Therapy Charges for Today       Code Description Service Date Service Provider Modifiers Qty    58598172530 HC OT SELF CARE/MGMT/TRAIN EA 15 MIN 9/21/2023 Zoraida Rodriguez OTR GO 2    61687073247 HC OT RE-EVAL 2 9/21/2023 Zoraida Rodriguez OTR GO 1    33100386230 HC OT BIS XTRACELL FLUID ANALYSIS 9/21/2023 Zoraida Rodriguez OTR GO 1          Dictated utilizing Dragon dictation:  Much of this encounter note is an electronic transcription/translation of spoken language to printed text. The electronic translation of spoken language may permit erroneous, or at times, nonsensical words or phrases to be inadvertently transcribed; Although I have reviewed the note for such errors, some may still exist.            AKOSUA Castro  9/21/2023

## 2023-10-05 ENCOUNTER — APPOINTMENT (OUTPATIENT)
Dept: OCCUPATIONAL THERAPY | Facility: HOSPITAL | Age: 58
End: 2023-10-05
Payer: COMMERCIAL

## 2023-10-09 ENCOUNTER — HOSPITAL ENCOUNTER (OUTPATIENT)
Dept: OCCUPATIONAL THERAPY | Facility: HOSPITAL | Age: 58
Setting detail: THERAPIES SERIES
Discharge: HOME | End: 2023-10-09
Payer: COMMERCIAL

## 2023-10-09 DIAGNOSIS — Z91.89 AT RISK FOR LYMPHEDEMA: ICD-10-CM

## 2023-10-09 DIAGNOSIS — C50.912 MALIGNANT NEOPLASM OF LEFT FEMALE BREAST, UNSPECIFIED ESTROGEN RECEPTOR STATUS, UNSPECIFIED SITE OF BREAST: Primary | ICD-10-CM

## 2023-10-09 PROCEDURE — 97535 SELF CARE MNGMENT TRAINING: CPT

## 2023-10-09 PROCEDURE — 97110 THERAPEUTIC EXERCISES: CPT

## 2023-10-09 PROCEDURE — 97140 MANUAL THERAPY 1/> REGIONS: CPT

## 2023-10-09 NOTE — THERAPY TREATMENT NOTE
Outpatient Occupational Therapy Lymphedema Treatment Note  [facility]     Patient Name: Kacey Garcia  : 1965  MRN: [id number]  Today's Date: 10/9/2023      Visit Date: 10/09/2023    Patient Active Problem List   Diagnosis    Anemia    Gastroesophageal reflux disease    Herniation of nucleus pulposus    Pain in extremity    Low back pain    Palpitations    Vitamin D deficiency    Nipple discharge    FH: breast cancer    Fibroadenosis of breast    Diffuse cystic mastopathy    BRCA2 gene mutation positive    Abnormal MRI, breast    Abnormal ultrasound of breast    FH: ovarian cancer    Fibroadenoma of breast    Epigastric pain    Biliary dyskinesia    Rectal bleeding    Asthma    Seasonal allergies    Fibroadenoma of right breast    DDD (degenerative disc disease), lumbar    Herniation of lumbar intervertebral disc with radiculopathy    Fibrocystic breast changes, bilateral    Increased risk of breast cancer        Past Medical History:   Diagnosis Date    Anxiety     Asthma     Basal cell carcinoma     RIGHT FACE, LEFT BREAST    BRCA gene positive     BRCA2    Chronic sinusitis     Constipation     WITH RECTAL BLEEDING PER DR TROY'S OFFICE NOTE    DDD (degenerative disc disease), cervical     Fibrocystic breast     GERD (gastroesophageal reflux disease)     Hashimoto's thyroiditis     History of anemia     History of migraine     History of prior pregnancies     x2    Irregular heart beat     TACHYCARDIA, SAW DR. KELLEY IN .  NO FOLLOW UP REQUIRED    PONV (postoperative nausea and vomiting)     S/P skin biopsy     R EAR, R BREAST, LOWER LEFT LEG, L THIGN    Spinal headache     Thyroid nodule     WITH GOITER    TMJ (dislocation of temporomandibular joint)     Varicose vein of leg     Vitamin D deficiency         Past Surgical History:   Procedure Laterality Date    BASAL CELL CARCINOMA EXCISION Right     Face. RIGHT BREAST    BREAST BIOPSY Left     Benign    BREAST BIOPSY Right 2009     Complex cystic lesion     SECTION      CHOLECYSTECTOMY WITH INTRAOPERATIVE CHOLANGIOGRAM N/A 2017    Procedure: CHOLECYSTECTOMY LAPAROSCOPIC INTRAOPERATIVE CHOLANGIOGRAM;  Surgeon: Tesfaye Daigle Jr., MD;  Location: [facility];  Service:     DILATATION AND CURETTAGE      LUMBAR DISC SURGERY      SINUS SURGERY      WISDOM TOOTH EXTRACTION           Visit Dx:      ICD-10-CM ICD-9-CM   1. Malignant neoplasm of left female breast, unspecified estrogen receptor status, unspecified site of breast  C50.912 174.9   2. At risk for lymphedema  Z91.89 V49.89        Lymphedema       Row Name 10/09/23 1400 10/09/23 0800          Subjective Pain    Able to rate subjective pain? -- yes  -RE     Pre-Treatment Pain Level -- 0  -RE     Post-Treatment Pain Level -- 0  -RE        Subjective    Subjective Comments -- Patient feels the cords in her axilla broke when she hit a curb.  She noted that she had significant relief from the pulling of the cords following this incident.  -RE        Right Upper Ext    Rt Shoulder Abduction AROM -- 160  -RE     Rt Shoulder Flexion AROM -- 160  -RE        Left Upper Ext    Lt Shoulder Abduction AROM -- 120  -RE     Lt Shoulder Flexion AROM -- 145  -RE        Lymphedema Measurements    Measurement Type(s) Quick Girth  -RE --     Quick Girth Areas Upper extremities  -RE --        LUE Quick Girth (cm)    Axilla 34.5 cm  -RE --     Mid upper arm 31 cm  -RE --     Elbow 25 cm  -RE --     Mid forearm 24 cm  -RE --     Wrist crease 15 cm  -RE --     Met-heads 17.8 cm  -RE --     LUE Quick Girth Total 147.3  -RE --        Compression/Skin Care    Compression/Skin Care Comments -- Measured for a Medi comfort arm sleeve size 3 extrawide and a regular length and 20 to 30 mmHg and Medi [place] compression gauntlet size 2 in 20 to 30 mmHg.  Recommended the Medi comfort and an extrawide style due to patient's hypersensitivity in her upper arm.  -RE               User Key  (r) = Recorded By, (t)  = Taken By, (c) = Cosigned By      Initials Name Provider Type    Zoraida Montenegro OTR Occupational Therapist                             OT Assessment/Plan       Row Name 10/09/23 1427          OT Assessment    Assessment Comments Goals updated . Following manual techniques and exercise patient's shoulder active range of motion was within normal limits and symmetrical.  I will continue to follow with bioimpedance for lymphedema prevention and surveillance.  Cording is still palpable in the left axilla but it is no longer painful and is not restricting active range of motion.  -RE     OT Diagnosis At risk for lymphedema  -RE        OT Plan    OT Plan Comments Follow-up in about 3 months for bioimpedance  -RE               User Key  (r) = Recorded By, (t) = Taken By, (c) = Cosigned By      Initials Name Provider Type    Zoraida Montenegro OTR Occupational Therapist                       OT Exercises       Row Name 10/09/23 0900             Exercise 1    Exercise Name 1 pulleys  -RE         Exercise 2    Exercise Name 2 shoulder flexion in supine  -RE      Reps 2 10  -RE         Exercise 3    Exercise Name 3 shoulder abd in supine  -RE      Sets 3 2  -RE      Reps 3 10  -RE                User Key  (r) = Recorded By, (t) = Taken By, (c) = Cosigned By      Initials Name Provider Type    Zoraida Montenegro OTR Occupational Therapist                  Manual Rx (last 36 hours)       Manual Treatments       Row Name 10/09/23 1430 10/09/23 0700          Total Minutes    17898 - OT Manual Therapy Minutes 25  -RE --        Manual Rx 1    Manual Rx 1 Location -- pec and scm sweep  -RE        Manual Rx 2    Manual Rx 2 Location -- s and c strokes to corded area in R axilla  -RE               User Key  (r) = Recorded By, (t) = Taken By, (c) = Cosigned By      Initials Name Provider Type    Zoraida Montenegro, OTR Occupational Therapist                      Therapy Education  Education Details: I recommended that patient wear a  compression sleeve during heavy exercise and air travel.  She will be contacted by [employer] if she has a co-pay for her garments otherwise they will send her her garments.  Patient is concerned about the compression being uncomfortable.  I recommended that she try wearing the garment for about 2 hours and then removing it.  We also discussed the usefulness of bioimpedance as a surveillance and prevention tool.  If her L-Dex values are consistent she may be able to avoid wearing a compression sleeve except in the most strenuous activities.  I encouraged her to continue with her home exercise program.  She should add the shoulder flexion and scaption on the wall.  Given: Symptoms/condition management, Other (comment), HEP  Program: Reinforced  How Provided: Verbal  Provided to: Patient  Level of Understanding: Teach back education performed, Verbalized  68252 - OT Self Care/Mgmt Minutes: 15    Outcome Measure Options: Quick DASH  Quick DASH  Open a tight or new jar.: Mild Difficulty  Do heavy household chores (e.g., wash walls, wash floors): Moderate Difficulty  Carry a shopping bag or briefcase: Mild Difficulty  Wash your back: Severe Difficulty  Use a knife to cut food: No Difficulty  Recreational activities in which you take some force or impact through your arm, should or hand (e.g. golf, hammering, tennis, etc.): Mild Difficulty  During the past week, to what extent has your arm, shoulder, or hand problem interfered with your normal social activites with family, friends, neighbors or groups?: Moderately  During the past week, were you limited in your work or other regular daily activities as a result of your arm, shoulder or hand problem?: Slightly Limited  Arm, Shoulder, or hand pain: Mild  Tingling (pins and needles) in your arm, shoulder, or hand: None  During the past week, how much difficulty have you had sleeping because of the pain in your arm, shoulder or hand?: No difficulty  Quick Dash Comments: 27%  disability  Number of Questions Answered: 11  Quick DASH Score: 27.27           Time Calculation:   OT Start Time: 0820  OT Stop Time: 0915  OT Time Calculation (min): 55 min  Total Timed Code Minutes- OT: 55 minute(s)  Timed Charges  86403 - OT Therapeutic Exercise Minutes: 15  51161 - OT Manual Therapy Minutes: 25  66324 - OT Self Care/Mgmt Minutes: 15  Total Minutes  Timed Charges Total Minutes: 55   Total Minutes: 55     Therapy Charges for Today       Code Description Service Date Service Provider Modifiers Qty    33504664568 HC OT THER PROC EA 15 MIN 10/9/2023 Zoraida Rodriguez OTR GO 1    63221326084 HC OT MANUAL THERAPY EA 15 MIN 10/9/2023 Zoraida Rodriguez OTR GO 2    36148219446 HC OT SELF CARE/MGMT/TRAIN EA 15 MIN 10/9/2023 Zoraida Rodriguez OTR GO 1          Dictated utilizing Dragon dictation:  Much of this encounter note is an electronic transcription/translation of spoken language to printed text. The electronic translation of spoken language may permit erroneous, or at times, nonsensical words or phrases to be inadvertently transcribed; Although I have reviewed the note for such errors, some may still exist.              AKOSUA Castro  10/9/2023

## 2023-11-30 ENCOUNTER — PATIENT OUTREACH (OUTPATIENT)
Dept: OTHER | Facility: HOSPITAL | Age: 58
End: 2023-11-30
Payer: COMMERCIAL

## 2023-11-30 NOTE — PROGRESS NOTES
Called Ms. Garcia to see how she was doing. She stated she is recovering from her last chemo treatment on Tuesday and will follow up with her med onc about next steps. She had a few questions about reconstruction and we discussed those. She was thankful for the help and will continue seeking treatment at an outside facility.  She has no other needs at this time. She was thankful for the call and will reach out if any questions or needs arise.

## 2023-12-21 ENCOUNTER — HOSPITAL ENCOUNTER (OUTPATIENT)
Dept: OCCUPATIONAL THERAPY | Facility: HOSPITAL | Age: 58
Discharge: HOME | End: 2023-12-21
Payer: COMMERCIAL

## 2023-12-21 DIAGNOSIS — M79.622 AXILLARY PAIN, LEFT: ICD-10-CM

## 2023-12-21 DIAGNOSIS — C50.912 MALIGNANT NEOPLASM OF LEFT FEMALE BREAST, UNSPECIFIED ESTROGEN RECEPTOR STATUS, UNSPECIFIED SITE OF BREAST: Primary | ICD-10-CM

## 2023-12-21 DIAGNOSIS — Z91.89 AT RISK FOR LYMPHEDEMA: ICD-10-CM

## 2023-12-21 PROCEDURE — 97140 MANUAL THERAPY 1/> REGIONS: CPT

## 2023-12-21 NOTE — THERAPY TREATMENT NOTE
Outpatient Occupational Therapy Lymphedema Treatment Note  [facility]     Patient Name: Kacey Garcia  : 1965  MRN: [id number]  Today's Date: 2023      Visit Date: 2023    Patient Active Problem List   Diagnosis    Anemia    Gastroesophageal reflux disease    Herniation of nucleus pulposus    Pain in extremity    Low back pain    Palpitations    Vitamin D deficiency    Nipple discharge    FH: breast cancer    Fibroadenosis of breast    Diffuse cystic mastopathy    BRCA2 gene mutation positive    Abnormal MRI, breast    Abnormal ultrasound of breast    FH: ovarian cancer    Fibroadenoma of breast    Epigastric pain    Biliary dyskinesia    Rectal bleeding    Asthma    Seasonal allergies    Fibroadenoma of right breast    DDD (degenerative disc disease), lumbar    Herniation of lumbar intervertebral disc with radiculopathy    Fibrocystic breast changes, bilateral    Increased risk of breast cancer        Past Medical History:   Diagnosis Date    Anxiety     Asthma     Basal cell carcinoma     RIGHT FACE, LEFT BREAST    BRCA gene positive     BRCA2    Chronic sinusitis     Constipation     WITH RECTAL BLEEDING PER DR TROY'S OFFICE NOTE    DDD (degenerative disc disease), cervical     Fibrocystic breast     GERD (gastroesophageal reflux disease)     Hashimoto's thyroiditis     History of anemia     History of migraine     History of prior pregnancies     x2    Irregular heart beat     TACHYCARDIA, SAW DR. KELLEY IN .  NO FOLLOW UP REQUIRED    PONV (postoperative nausea and vomiting)     S/P skin biopsy     R EAR, R BREAST, LOWER LEFT LEG, L THIGN    Spinal headache     Thyroid nodule     WITH GOITER    TMJ (dislocation of temporomandibular joint)     Varicose vein of leg     Vitamin D deficiency         Past Surgical History:   Procedure Laterality Date    BASAL CELL CARCINOMA EXCISION Right     Face. RIGHT BREAST    BREAST BIOPSY Left     Benign    BREAST BIOPSY Right 2009     Complex cystic lesion     SECTION      CHOLECYSTECTOMY WITH INTRAOPERATIVE CHOLANGIOGRAM N/A 2017    Procedure: CHOLECYSTECTOMY LAPAROSCOPIC INTRAOPERATIVE CHOLANGIOGRAM;  Surgeon: Tesfaye Daigle Jr., MD;  Location: [facility];  Service:     DILATATION AND CURETTAGE      LUMBAR DISC SURGERY      SINUS SURGERY      WISDOM TOOTH EXTRACTION           Visit Dx:      ICD-10-CM ICD-9-CM   1. Malignant neoplasm of left female breast, unspecified estrogen receptor status, unspecified site of breast  C50.912 174.9   2. At risk for lymphedema  Z91.89 V49.89   3. Axillary pain, left  M79.622 729.5        Lymphedema       Row Name 23 0900             Subjective Pain    Able to rate subjective pain? yes  -RE      Pre-Treatment Pain Level 0  -RE      Post-Treatment Pain Level 0  -RE      Subjective Pain Comment Pain is improved today she has no pain at rest.  -RE         Subjective    Subjective Comments Patient returns today with concerns about increased axillary tightness and pain.  She also reports that she developed some hand swelling with sleeve use.  She was not using the gauntlet at the time.  She was only using the sleeve.  Patient states that the cording is worse.  -RE         L-Dex Bioimpedence Screening    L-Dex Measurement Extremity LUE  -RE      L-Dex Patient Position Standing  -RE      L-Dex UE Dominate Side Right  -RE      L-Dex UE At Risk Side Left  -RE      L-Dex UE Pre Surgical Value Yes  -RE      L-Dex UE Score 3.9  -RE      L-Dex UE Baseline Score -0.1  -RE      L-Dex UE Value Change 4  -RE      L-Dex UE Comment Significantly increased but still within normal limits.  -RE                User Key  (r) = Recorded By, (t) = Taken By, (c) = Cosigned By      Initials Name Provider Type    RE Zoraida Rodriguez OTR Occupational Therapist                  The patient had a  SOZO measurement which I reviewed today. The score is within normal limits  see scanned to EMR. Bioimpedance spectroscopy  helps identify the   onset of lymphedema in an arm or leg before patients experience noticeable swelling. Research has   shown that 92% of patients with early detection of lymphedema using L-Dex combined with   intervention do not progress to chronic lymphedema through three years. Additionally, as of March 2023, the NCCN Guidelines® for Survivorship recommend proactive screening for lymphedema using   bioimpedance spectroscopy. Whenever possible, patients are tested for baseline L-Dex score before   cancer treatment begins and then are reassessed during regular follow-up visits using the SOZO device.   Otherwise, this can be started postoperatively and continued during regular follow-up visits. If the   patient’s L-Dex score increases above normal levels, that is a sign that lymphedema is developing and a   referral is made to occupational therapy for further evaluation and early compression treatment.   Lymphedema assessment with the SOZO L-Dex score is recommended to be done every 3 months for   the first 3 years and then every 6 months for years 4 and 5 followed by annually afterwards           OT Assessment/Plan       Row Name 12/21/23 1247          OT Assessment    Assessment Comments Patient returns today with complaints of worsening cording.  Cording is a visible from the axilla into the forearm.  Axillary cords are painful upon palpation.  Following manual therapy patient reports improved comfort with shoulder active range of motion and decreased sensation of tightness.  Patient's L-Dex value is increased at 3.9.  It is still within normal limits but is significantly increased, by 4 points, since her last measurement.  I recommended that we continue with manual therapy once a week for an additional 2 to 4 weeks to address the cording.  -RE     OT Diagnosis At risk for lymphedema; left axillary pain/cording  -RE        OT Plan    OT Plan Comments Continue manual therapy for axillary cording  -RE                User Key  (r) = Recorded By, (t) = Taken By, (c) = Cosigned By      Initials Name Provider Type    Zoraida Montenegro OTR Occupational Therapist                        Manual Rx (last 36 hours)       Manual Treatments       Row Name 12/21/23 1250             Total Minutes    01876 - OT Manual Therapy Minutes 35  -RE                User Key  (r) = Recorded By, (t) = Taken By, (c) = Cosigned By      Initials Name Provider Type    Zoraida Montenegro OTR Occupational Therapist                      Therapy Education  Education Details: Encouraged patient to continue with shoulder flexion stretching with her wrist in extension at her elbow straight.  I recommended 7-10 repetitions x 2 sets twice a day  Given: HEP, Symptoms/condition management  Program: New  How Provided: Verbal, Demonstration  Provided to: Patient  Level of Understanding: Teach back education performed, Verbalized  98357 - OT Self Care/Mgmt Minutes: 5                Time Calculation:   OT Start Time: 0935  OT Stop Time: 1020  OT Time Calculation (min): 45 min  Total Timed Code Minutes- OT: 35 minute(s)  Timed Charges  27726 - OT Manual Therapy Minutes: 35  75942 - OT Self Care/Mgmt Minutes: 5  Untimed Charges  37863 - OT Bioimpedence Rx Minutes: 5  Total Minutes  Timed Charges Total Minutes: 40  Untimed Charges Total Minutes: 5   Total Minutes: 45     Therapy Charges for Today       Code Description Service Date Service Provider Modifiers Qty    [id number]  OT MANUAL THERAPY EA 15 MIN 12/21/2023 Zoraida Rodriguez OTR GO 3          Dictated utilizing Dragon dictation:  Much of this encounter note is an electronic transcription/translation of spoken language to printed text. The electronic translation of spoken language may permit erroneous, or at times, nonsensical words or phrases to be inadvertently transcribed; Although I have reviewed the note for such errors, some may still exist.              AKOSUA Castro  12/21/2023

## 2023-12-26 ENCOUNTER — HOSPITAL ENCOUNTER (OUTPATIENT)
Dept: OCCUPATIONAL THERAPY | Facility: HOSPITAL | Age: 58
Setting detail: THERAPIES SERIES
Discharge: HOME | End: 2023-12-26
Payer: COMMERCIAL

## 2023-12-26 DIAGNOSIS — C50.912 MALIGNANT NEOPLASM OF LEFT FEMALE BREAST, UNSPECIFIED ESTROGEN RECEPTOR STATUS, UNSPECIFIED SITE OF BREAST: Primary | ICD-10-CM

## 2023-12-26 DIAGNOSIS — Z91.89 AT RISK FOR LYMPHEDEMA: ICD-10-CM

## 2023-12-26 DIAGNOSIS — M79.622 AXILLARY PAIN, LEFT: ICD-10-CM

## 2023-12-26 PROCEDURE — 97140 MANUAL THERAPY 1/> REGIONS: CPT

## 2023-12-26 NOTE — THERAPY TREATMENT NOTE
Outpatient Occupational Therapy Lymphedema Treatment Note  [facility]     Patient Name: Kacey Garcia  : 1965  MRN: [id number]  Today's Date: 2023      Visit Date: 2023    Patient Active Problem List   Diagnosis    Anemia    Gastroesophageal reflux disease    Herniation of nucleus pulposus    Pain in extremity    Low back pain    Palpitations    Vitamin D deficiency    Nipple discharge    FH: breast cancer    Fibroadenosis of breast    Diffuse cystic mastopathy    BRCA2 gene mutation positive    Abnormal MRI, breast    Abnormal ultrasound of breast    FH: ovarian cancer    Fibroadenoma of breast    Epigastric pain    Biliary dyskinesia    Rectal bleeding    Asthma    Seasonal allergies    Fibroadenoma of right breast    DDD (degenerative disc disease), lumbar    Herniation of lumbar intervertebral disc with radiculopathy    Fibrocystic breast changes, bilateral    Increased risk of breast cancer        Past Medical History:   Diagnosis Date    Anxiety     Asthma     Basal cell carcinoma     RIGHT FACE, LEFT BREAST    BRCA gene positive     BRCA2    Chronic sinusitis     Constipation     WITH RECTAL BLEEDING PER DR TROY'S OFFICE NOTE    DDD (degenerative disc disease), cervical     Fibrocystic breast     GERD (gastroesophageal reflux disease)     Hashimoto's thyroiditis     History of anemia     History of migraine     History of prior pregnancies     x2    Irregular heart beat     TACHYCARDIA, SAW DR. KELLEY IN .  NO FOLLOW UP REQUIRED    PONV (postoperative nausea and vomiting)     S/P skin biopsy     R EAR, R BREAST, LOWER LEFT LEG, L THIGN    Spinal headache     Thyroid nodule     WITH GOITER    TMJ (dislocation of temporomandibular joint)     Varicose vein of leg     Vitamin D deficiency         Past Surgical History:   Procedure Laterality Date    BASAL CELL CARCINOMA EXCISION Right     Face. RIGHT BREAST    BREAST BIOPSY Left     Benign    BREAST BIOPSY Right 2009     Complex cystic lesion     SECTION      CHOLECYSTECTOMY WITH INTRAOPERATIVE CHOLANGIOGRAM N/A 2017    Procedure: CHOLECYSTECTOMY LAPAROSCOPIC INTRAOPERATIVE CHOLANGIOGRAM;  Surgeon: Tesfaye Daigle Jr., MD;  Location: [facility];  Service:     DILATATION AND CURETTAGE      LUMBAR DISC SURGERY      SINUS SURGERY      WISDOM TOOTH EXTRACTION           Visit Dx:      ICD-10-CM ICD-9-CM   1. Malignant neoplasm of left female breast, unspecified estrogen receptor status, unspecified site of breast  C50.912 174.9   2. At risk for lymphedema  Z91.89 V49.89   3. Axillary pain, left  M79.622 729.5        Lymphedema       Row Name 23 1300             Subjective Pain    Able to rate subjective pain? yes  -RE      Pre-Treatment Pain Level 0  -RE      Post-Treatment Pain Level 0  -RE         Subjective    Subjective Comments Feels that the cords are still present but her ROM is improving.  -RE                User Key  (r) = Recorded By, (t) = Taken By, (c) = Cosigned By      Initials Name Provider Type    Zoraida Montenegro OTR Occupational Therapist                             OT Assessment/Plan       Row Name 23 1554          OT Assessment    Assessment Comments Patient continues with palpable cords throughout the left upper extremity and left axilla.  Patient does report that she is noticing improved pain-free range of motion.  Following manual therapy patient had decreased discomfort in the ribs on the left side and in the axilla.  -RE        OT Plan    OT Plan Comments Continue manual therapy for axillary cording  -RE               User Key  (r) = Recorded By, (t) = Taken By, (c) = Cosigned By      Initials Name Provider Type    Zoraida Montenegro OTR Occupational Therapist                        Manual Rx (last 36 hours)       Manual Treatments       Row Name 23 1555 23 1500          Total Minutes    57141 - OT Manual Therapy Minutes 35  -RE --        Manual Rx 2    Manual Rx 2  Location -- s and c strokes to corded area in R axilla  -RE        Manual Rx 3    Manual Rx 3 Location -- Gentle traction to the corded area  -RE               User Key  (r) = Recorded By, (t) = Taken By, (c) = Cosigned By      Initials Name Provider Type    Zoraida Montenegro OTR Occupational Therapist                      Therapy Education  Education Details: Continue with current home exercise program with emphasis on shoulder flexion combined with wrist extension.  Given: HEP  Program: Reinforced  How Provided: Verbal, Demonstration  Provided to: Patient  Level of Understanding: Teach back education performed, Verbalized  41276 - OT Self Care/Mgmt Minutes: 5                Time Calculation:   OT Start Time: 1308  OT Stop Time: 1348  OT Time Calculation (min): 40 min  Total Timed Code Minutes- OT: 40 minute(s)  Timed Charges  99120 - OT Manual Therapy Minutes: 35  78589 - OT Self Care/Mgmt Minutes: 5  Total Minutes  Timed Charges Total Minutes: 40   Total Minutes: 40     Therapy Charges for Today       Code Description Service Date Service Provider Modifiers Qty    [id number]  OT MANUAL THERAPY EA 15 MIN 12/26/2023 Zoraida Rodriguez OTR GO 3          Dictated utilizing Dragon dictation:  Much of this encounter note is an electronic transcription/translation of spoken language to printed text. The electronic translation of spoken language may permit erroneous, or at times, nonsensical words or phrases to be inadvertently transcribed; Although I have reviewed the note for such errors, some may still exist.              AKOSUA Castro  12/26/2023

## 2024-01-04 ENCOUNTER — APPOINTMENT (OUTPATIENT)
Dept: OCCUPATIONAL THERAPY | Facility: HOSPITAL | Age: 59
End: 2024-01-04
Payer: COMMERCIAL

## 2024-01-18 ENCOUNTER — HOSPITAL ENCOUNTER (OUTPATIENT)
Dept: OCCUPATIONAL THERAPY | Facility: HOSPITAL | Age: 59
Setting detail: THERAPIES SERIES
Discharge: HOME | End: 2024-01-18
Payer: COMMERCIAL

## 2024-01-18 DIAGNOSIS — C50.912 MALIGNANT NEOPLASM OF LEFT FEMALE BREAST, UNSPECIFIED ESTROGEN RECEPTOR STATUS, UNSPECIFIED SITE OF BREAST: Primary | ICD-10-CM

## 2024-01-18 DIAGNOSIS — M79.622 AXILLARY PAIN, LEFT: ICD-10-CM

## 2024-01-18 DIAGNOSIS — Z91.89 AT RISK FOR LYMPHEDEMA: ICD-10-CM

## 2024-01-18 PROCEDURE — 97140 MANUAL THERAPY 1/> REGIONS: CPT

## 2024-01-18 PROCEDURE — 93702 BIS XTRACELL FLUID ANALYSIS: CPT

## 2024-01-18 PROCEDURE — 97535 SELF CARE MNGMENT TRAINING: CPT

## 2024-01-18 NOTE — THERAPY PROGRESS REPORT/RE-CERT
Outpatient Occupational Therapy Lymphedema Progress Note  [facility]     Patient Name: Kacey Garcia  : 1965  MRN: [id number]  Today's Date: 2024      Visit Date: 2024    Patient Active Problem List   Diagnosis    Anemia    Gastroesophageal reflux disease    Herniation of nucleus pulposus    Pain in extremity    Low back pain    Palpitations    Vitamin D deficiency    Nipple discharge    FH: breast cancer    Fibroadenosis of breast    Diffuse cystic mastopathy    BRCA2 gene mutation positive    Abnormal MRI, breast    Abnormal ultrasound of breast    FH: ovarian cancer    Fibroadenoma of breast    Epigastric pain    Biliary dyskinesia    Rectal bleeding    Asthma    Seasonal allergies    Fibroadenoma of right breast    DDD (degenerative disc disease), lumbar    Herniation of lumbar intervertebral disc with radiculopathy    Fibrocystic breast changes, bilateral    Increased risk of breast cancer        Past Medical History:   Diagnosis Date    Anxiety     Asthma     Basal cell carcinoma     RIGHT FACE, LEFT BREAST    BRCA gene positive     BRCA2    Chronic sinusitis     Constipation     WITH RECTAL BLEEDING PER DR TROY'S OFFICE NOTE    DDD (degenerative disc disease), cervical     Fibrocystic breast     GERD (gastroesophageal reflux disease)     Hashimoto's thyroiditis     History of anemia     History of migraine     History of prior pregnancies     x2    Irregular heart beat     TACHYCARDIA, SAW DR. KELLEY IN .  NO FOLLOW UP REQUIRED    PONV (postoperative nausea and vomiting)     S/P skin biopsy     R EAR, R BREAST, LOWER LEFT LEG, L THIGN    Spinal headache     Thyroid nodule     WITH GOITER    TMJ (dislocation of temporomandibular joint)     Varicose vein of leg     Vitamin D deficiency         Past Surgical History:   Procedure Laterality Date    BASAL CELL CARCINOMA EXCISION Right     Face. RIGHT BREAST    BREAST BIOPSY Left     Benign    BREAST BIOPSY Right 2009     Complex cystic lesion     SECTION      CHOLECYSTECTOMY WITH INTRAOPERATIVE CHOLANGIOGRAM N/A 2017    Procedure: CHOLECYSTECTOMY LAPAROSCOPIC INTRAOPERATIVE CHOLANGIOGRAM;  Surgeon: Tesfaye Daigle Jr., MD;  Location: [facility];  Service:     DILATATION AND CURETTAGE      LUMBAR DISC SURGERY      SINUS SURGERY      WISDOM TOOTH EXTRACTION           Visit Dx:      ICD-10-CM ICD-9-CM   1. Malignant neoplasm of left female breast, unspecified estrogen receptor status, unspecified site of breast  C50.912 174.9   2. At risk for lymphedema  Z91.89 V49.89   3. Axillary pain, left  M79.622 729.5        Lymphedema       Row Name 24 1500 24 1300          Subjective Pain    Able to rate subjective pain? -- yes  -RE     Pre-Treatment Pain Level -- 0  -RE     Post-Treatment Pain Level -- 0  -RE        Subjective    Subjective Comments -- Wore her sleeve while traveling. [place] that she might have some swelling.  -RE        Right Upper Ext    Rt Upper Extremity Comments  WFL  -RE --        Left Upper Ext    Lt Shoulder Abduction AROM 160  -RE --     Lt Shoulder Flexion AROM 160  -RE --        L-Dex Bioimpedence Screening    L-Dex Measurement Extremity -- LUE  -RE     L-Dex Patient Position -- Standing  -RE     L-Dex UE Dominate Side -- Right  -RE     L-Dex UE At Risk Side -- Left  -RE     L-Dex UE Pre Surgical Value -- Yes  -RE     L-Dex UE Score -- -2.6  -RE     L-Dex UE Baseline Score -- -0.1  -RE     L-Dex UE Value Change -- -2.5  -RE     L-Dex UE Comment -- WNL  -RE               User Key  (r) = Recorded By, (t) = Taken By, (c) = Cosigned By      Initials Name Provider Type    RE Zoraida Rodriguez OTR Occupational Therapist                  The patient had a 1 month SOZO measurement which I reviewed today. The score is WNL  see scanned to EMR. Bioimpedance spectroscopy helps identify the   onset of lymphedema in an arm or leg before patients experience noticeable swelling. Research has   shown that 92%  of patients with early detection of lymphedema using L-Dex combined with   intervention do not progress to chronic lymphedema through three years. Additionally, as of March 2023, the NCCN Guidelines® for Survivorship recommend proactive screening for lymphedema using   bioimpedance spectroscopy. Whenever possible, patients are tested for baseline L-Dex score before   cancer treatment begins and then are reassessed during regular follow-up visits using the SOZO device.   Otherwise, this can be started postoperatively and continued during regular follow-up visits. If the   patient’s L-Dex score increases above normal levels, that is a sign that lymphedema is developing and a   referral is made to occupational therapy for further evaluation and early compression treatment.   Lymphedema assessment with the SOZO L-Dex score is recommended to be done every 3 months for   the first 3 years and then every 6 months for years 4 and 5 followed by annually afterwards           OT Assessment/Plan       Row Name 01/18/24 1516          OT Assessment    Impairments Impaired lymphatic circulation  -RE     Assessment Comments Goals updated.  See flowsheet.  Patient has progressed very well all short-term goals are met.  I updated her home exercise program to include some upper back strengthening and gentle stretches for the chest.  Patient's bilateral upper extremity active range of motion is within normal limits.  Cords are still palpable in the axilla and painful to deep palpation but are no longer affecting her range of motion.  Patient's L-Dex value today was -2.6 which is significantly improved from her measurement in December.  I encouraged her to continue with aerobic exercise as well as stretching and strengthening.  I will follow-up for bioimpedance in 3 months unless patient has new or worsening symptoms.  -RE     OT Diagnosis At risk for lymphedema  -RE     OT Rehab Potential Good  -RE     Patient/caregiver participated  in establishment of treatment plan and goals Yes  -RE     Patient would benefit from skilled therapy intervention Yes  -RE        OT Plan    OT Frequency Other (comment)  -RE     Predicted Duration of Therapy Intervention (OT) Bioimpedance every 1 to 3 months  -RE     Planned CPT's? OT SELF CARE/MGMT/TRAIN 15 MIN: 69747;OT MANUAL THERAPY EA 15 MIN: 53716;OT BIS XTRACELL FLUID ANALYSIS: 51767  -RE     OT Plan Comments Follow-up in 3 months  -RE               User Key  (r) = Recorded By, (t) = Taken By, (c) = Cosigned By      Initials Name Provider Type    Zoraida Montenegro, FABRICER Occupational Therapist                        Manual Rx (last 36 hours)       Manual Treatments       Row Name 01/18/24 1519 01/18/24 1500          Total Minutes    63693 - OT Manual Therapy Minutes 45  -RE --        Manual Rx 2    Manual Rx 2 Location -- s and c strokes to corded area in R axilla  -RE        Manual Rx 3    Manual Rx 3 Location -- Skin rolling left rib cage  -RE        Manual Rx 4    Manual Rx 4 Location -- Left pec sweep  -RE               User Key  (r) = Recorded By, (t) = Taken By, (c) = Cosigned By      Initials Name Provider Type    Zoraida Montenegro, OTR Occupational Therapist                   OT Goals       Row Name 01/18/24 1300          OT Short Term Goals    STG 1 Pt will demonstrate understanding of use of compression sleeve for edema prevention, exercise and air travel.  -RE     STG 1 Progress Met  -RE     STG 2 Patient will demonstrate proper awareness of “What is Lymphedema?” and “Healthy Habits” for improved prevention, management, care of symptoms and ease of transition to self-care of condition.  -RE     STG 2 Progress Met  -RE     STG 3 Pt will demonstrate understanding of use of compression sleeve for edema prevention, exercise and air travel.  -RE     STG 3 Progress Met  -RE        Long Term Goals    LTG Date to Achieve 04/18/24  -RE     LTG 1 Patient independent and compliant with initial home exercise  program focused on gentle AROM and stretching to improve AROM to pre-surgical level.  -RE     LTG 1 Progress Ongoing  -RE     LTG 2 Patient will participate in bioimpedance scans postoperatively and then every 3 months as a method of early detection of lymphedema to allow for early intervention.  -RE     LTG 2 Progress Met;Ongoing  -RE     LTG 3 Patient's bioimpedance score to remain below 6.4 for decreased risk of stage II lymphedema.  -RE     LTG 3 Progress Met;Ongoing  -RE     LTG 4 Patient to demonstrate increased bilateral shoulder flexion to 160 to improve functional UE use and to restore pre operative AROM per patient perception.  -RE     LTG 4 Progress Met  -RE     LTG 5 Patient to demonstrate increased bilateral shoulder abduction to 160 to improve functional UE use and to restore preoperative AROM per patient perception.  -RE     LTG 5 Progress Met  -RE        Time Calculation    OT Goal Re-Cert Due Date 04/18/24  -RE               User Key  (r) = Recorded By, (t) = Taken By, (c) = Cosigned By      Initials Name Provider Type    Zoraida Montenegro OTR Occupational Therapist                    Therapy Education  Education Details: Access Code: [email or web address]  URL: [email or web address]  Date: 01/18/2024  Prepared by: Zoraida Rodriguez    Exercises  - Supine Chest Stretch on Foam Roll  - 1 x daily - 7 x weekly - 3 sets - 10 reps  - Shoulder External Rotation and Scapular Retraction  - 1 x daily - 7 x weekly - 3 sets - 10 reps  - Thoracic Foam Roll Mobilization Backstroke  - 1 x daily - 7 x weekly - 3 sets - 10 reps  - [place] Emigrant on Foam Roll  - 1 x daily - 7 x weekly - 3 sets - 10 reps; pectoralis major and minor stretching and upper back strengthening 7-10 reps x 2 sets; supine hands overhead with and without trunk rotation 10 x 2 sets; hands and knees sit back; moose stretch.  I recommended patient continue with compression garment use for heavy upper extremity exercise and heavy household  tasks.  Given: HEP, Symptoms/condition management  Program: New, Reinforced  How Provided: Verbal, Demonstration, Written  Provided to: Patient  Level of Understanding: Teach back education performed, Verbalized  87918 - OT Self Care/Mgmt Minutes: 15                Time Calculation:   OT Start Time: 1350  OT Stop Time: 1500  OT Time Calculation (min): 70 min  Total Timed Code Minutes- OT: 60 minute(s)  Timed Charges  22544 - OT Manual Therapy Minutes: 45  91917 - OT Self Care/Mgmt Minutes: 15  Untimed Charges  83230 - OT Bioimpedence Rx Minutes: 10  Total Minutes  Timed Charges Total Minutes: 60  Untimed Charges Total Minutes: 10   Total Minutes: 70     Therapy Charges for Today       Code Description Service Date Service Provider Modifiers Qty    84041810071 HC OT MANUAL THERAPY EA 15 MIN 1/18/2024 Zoraida Rodriguez OTR GO 3    86416382051 HC OT SELF CARE/MGMT/TRAIN EA 15 MIN 1/18/2024 Zoraida Rodriguez OTR GO 1    98377614767 HC OT BIS XTRACELL FLUID ANALYSIS 1/18/2024 Zoraida Rodriguez OTR GO 1          Dictated utilizing Dragon dictation:  Much of this encounter note is an electronic transcription/translation of spoken language to printed text. The electronic translation of spoken language may permit erroneous, or at times, nonsensical words or phrases to be inadvertently transcribed; Although I have reviewed the note for such errors, some may still exist.              AKOSUA Castro  1/18/2024

## 2024-04-15 ENCOUNTER — HOSPITAL ENCOUNTER (OUTPATIENT)
Dept: OCCUPATIONAL THERAPY | Facility: HOSPITAL | Age: 59
Setting detail: THERAPIES SERIES
Discharge: HOME | End: 2024-04-15
Payer: COMMERCIAL

## 2024-04-15 DIAGNOSIS — Z91.89 AT RISK FOR LYMPHEDEMA: ICD-10-CM

## 2024-04-15 DIAGNOSIS — M79.622 AXILLARY PAIN, LEFT: ICD-10-CM

## 2024-04-15 DIAGNOSIS — C50.912 MALIGNANT NEOPLASM OF LEFT FEMALE BREAST, UNSPECIFIED ESTROGEN RECEPTOR STATUS, UNSPECIFIED SITE OF BREAST: Primary | ICD-10-CM

## 2024-04-15 PROCEDURE — 97535 SELF CARE MNGMENT TRAINING: CPT

## 2024-04-15 PROCEDURE — 93702 BIS XTRACELL FLUID ANALYSIS: CPT

## 2024-04-15 NOTE — THERAPY PROGRESS REPORT/RE-CERT
Outpatient Occupational Therapy Lymphedema Progress Note  [facility]     Patient Name: Kacey Garcia  : 1965  MRN: [id number]  Today's Date: 4/15/2024      Visit Date: 04/15/2024    Patient Active Problem List   Diagnosis    Anemia    Gastroesophageal reflux disease    Herniation of nucleus pulposus    Pain in extremity    Low back pain    Palpitations    Vitamin D deficiency    Nipple discharge    FH: breast cancer    Fibroadenosis of breast    Diffuse cystic mastopathy    BRCA2 gene mutation positive    Abnormal MRI, breast    Abnormal ultrasound of breast    FH: ovarian cancer    Fibroadenoma of breast    Epigastric pain    Biliary dyskinesia    Rectal bleeding    Asthma    Seasonal allergies    Fibroadenoma of right breast    DDD (degenerative disc disease), lumbar    Herniation of lumbar intervertebral disc with radiculopathy    Fibrocystic breast changes, bilateral    Increased risk of breast cancer        Past Medical History:   Diagnosis Date    Anxiety     Asthma     Basal cell carcinoma     RIGHT FACE, LEFT BREAST    BRCA gene positive     BRCA2    Chronic sinusitis     Constipation     WITH RECTAL BLEEDING PER DR TROY'S OFFICE NOTE    DDD (degenerative disc disease), cervical     Fibrocystic breast     GERD (gastroesophageal reflux disease)     Hashimoto's thyroiditis     History of anemia     History of migraine     History of prior pregnancies     x2    Irregular heart beat     TACHYCARDIA, SAW DR. KELLEY IN .  NO FOLLOW UP REQUIRED    PONV (postoperative nausea and vomiting)     S/P skin biopsy     R EAR, R BREAST, LOWER LEFT LEG, L THIGN    Spinal headache     Thyroid nodule     WITH GOITER    TMJ (dislocation of temporomandibular joint)     Varicose vein of leg     Vitamin D deficiency         Past Surgical History:   Procedure Laterality Date    BASAL CELL CARCINOMA EXCISION Right     Face. RIGHT BREAST    BREAST BIOPSY Left     Benign    BREAST BIOPSY Right 2009     Complex cystic lesion     SECTION      CHOLECYSTECTOMY WITH INTRAOPERATIVE CHOLANGIOGRAM N/A 2017    Procedure: CHOLECYSTECTOMY LAPAROSCOPIC INTRAOPERATIVE CHOLANGIOGRAM;  Surgeon: Tesfaye Daigle Jr., MD;  Location: [facility];  Service:     DILATATION AND CURETTAGE      LUMBAR DISC SURGERY      SINUS SURGERY      WISDOM TOOTH EXTRACTION           Visit Dx:      ICD-10-CM ICD-9-CM   1. Malignant neoplasm of left female breast, unspecified estrogen receptor status, unspecified site of breast  C50.912 174.9   2. At risk for lymphedema  Z91.89 V49.89   3. Axillary pain, left  M79.622 729.5        Lymphedema       Row Name 04/15/24 1400             Subjective Pain    Able to rate subjective pain? yes  -RE      Pre-Treatment Pain Level 0  -RE      Post-Treatment Pain Level 0  -RE         L-Dex Bioimpedence Screening    L-Dex Measurement Extremity LUE  -RE      L-Dex Patient Position Standing  -RE      L-Dex UE Dominate Side Right  -RE      L-Dex UE At Risk Side Left  -RE      L-Dex UE Pre Surgical Value Yes  -RE      L-Dex UE Score -0.8  -RE      L-Dex UE Baseline Score -0.1  -RE      L-Dex UE Value Change -0.7  -RE      L-Dex UE Comment WNL  -RE                User Key  (r) = Recorded By, (t) = Taken By, (c) = Cosigned By      Initials Name Provider Type    RE Zoraida Rodriguez OTR Occupational Therapist                  The patient had a 3-month SOZO measurement which I reviewed today. The score is WNL  see scanned to EMR. Bioimpedance spectroscopy helps identify the   onset of lymphedema in an arm or leg before patients experience noticeable swelling. Research has   shown that 92% of patients with early detection of lymphedema using L-Dex combined with   intervention do not progress to chronic lymphedema through three years. Additionally, as of 2023, the NCCN Guidelines® for Survivorship recommend proactive screening for lymphedema using   bioimpedance spectroscopy. Whenever possible, patients  are tested for baseline L-Dex score before   cancer treatment begins and then are reassessed during regular follow-up visits using the SOZO device.   Otherwise, this can be started postoperatively and continued during regular follow-up visits. If the   patient’s L-Dex score increases above normal levels, that is a sign that lymphedema is developing and a   referral is made to occupational therapy for further evaluation and early compression treatment.   Lymphedema assessment with the SOZO L-Dex score is recommended to be done every 3 months for   the first 3 years and then every 6 months for years 4 and 5 followed by annually afterwards           OT Assessment/Plan       Row Name 04/15/24 1894          OT Assessment    Impairments Impaired lymphatic circulation  -RE     Assessment Comments Patient returns for bioimpedance reassessment.  Her last measurement was 3 months ago.  L-Dex value today is -.8 which is within normal limits and consistent with her baseline.  She has no new complaints.  Today we reviewed signs and symptoms of lymphedema.  I recommended follow-up in 3 months.  -RE     OT Diagnosis At risk for lymphedema  -RE     OT Rehab Potential Good  -RE     Patient/caregiver participated in establishment of treatment plan and goals Yes  -RE     Patient would benefit from skilled therapy intervention Yes  -RE        OT Plan    OT Frequency Other (comment)  -RE     Predicted Duration of Therapy Intervention (OT) Bioimpedance every 3 months  -RE     Planned CPT's? OT SELF CARE/MGMT/TRAIN 15 MIN: 11818;OT BIS XTRACELL FLUID ANALYSIS: 89091  -RE     OT Plan Comments Follow-up in 3 months  -RE               User Key  (r) = Recorded By, (t) = Taken By, (c) = Cosigned By      Initials Name Provider Type    RE Zoraida Rodriguez OTR Occupational Therapist                           OT Goals       Row Name 04/15/24 1400          OT Short Term Goals    STG 1 Pt will demonstrate understanding of use of compression sleeve for  edema prevention, exercise and air travel.  -RE     STG 1 Progress Met  -RE     STG 2 Patient will demonstrate proper awareness of “What is Lymphedema?” and “Healthy Habits” for improved prevention, management, care of symptoms and ease of transition to self-care of condition.  -RE     STG 2 Progress Met  -RE     STG 3 Pt will demonstrate understanding of use of compression sleeve for edema prevention, exercise and air travel.  -RE     STG 3 Progress Met  -RE        Long Term Goals    LTG Date to Achieve 07/15/24  -RE     LTG 1 Patient independent and compliant with initial home exercise program focused on gentle AROM and stretching to improve AROM to pre-surgical level.  -RE     LTG 1 Progress Ongoing  -RE     LTG 2 Patient will participate in bioimpedance scans postoperatively and then every 3 months as a method of early detection of lymphedema to allow for early intervention.  -RE     LTG 2 Progress Met;Ongoing  -RE     LTG 3 Patient's bioimpedance score to remain below 6.4 for decreased risk of stage II lymphedema.  -RE     LTG 3 Progress Met;Ongoing  -RE     LTG 4 Patient to demonstrate increased bilateral shoulder flexion to 160 to improve functional UE use and to restore pre operative AROM per patient perception.  -RE     LTG 4 Progress Met  -RE     LTG 5 Patient to demonstrate increased bilateral shoulder abduction to 160 to improve functional UE use and to restore preoperative AROM per patient perception.  -RE     LTG 5 Progress Met  -RE        Time Calculation    OT Goal Re-Cert Due Date 07/15/24  -RE               User Key  (r) = Recorded By, (t) = Taken By, (c) = Cosigned By      Initials Name Provider Type    Zoraida Montenegro OTR Occupational Therapist                    Therapy Education  Education Details: Discussed patient's current L-Dex value of -.8 and recommended sleeve wear during air travel and upper extremity exercise as well as heavy household activities.  I did tell her that I do not think  it is necessary to wear a sleeve when she rides her e-bike.  I provided her with our standard upper extremity lymphedema home exercise program to perform as needed as well as I encouraged her to continue to stretch.  I reviewed the signs and symptoms that would indicate the need to return for medical assessment and then to return to therapy including swelling, heaviness, or unusual muscle fatigue.  Given: Symptoms/condition management, HEP  Program: Reinforced  How Provided: Verbal, Written  Provided to: Patient  Level of Understanding: Teach back education performed, Verbalized  76191 - OT Self Care/Mgmt Minutes: 10                Time Calculation:   OT Start Time: 1430  OT Stop Time: 1450  OT Time Calculation (min): 20 min  Total Timed Code Minutes- OT: 10 minute(s)  Timed Charges  03653 - OT Self Care/Mgmt Minutes: 10  Untimed Charges  54462 - OT Bioimpedence Rx Minutes: 10  Total Minutes  Timed Charges Total Minutes: 10  Untimed Charges Total Minutes: 10   Total Minutes: 20     Therapy Charges for Today       Code Description Service Date Service Provider Modifiers Qty    47821225813 HC OT SELF CARE/MGMT/TRAIN EA 15 MIN 4/15/2024 Zoraida Rodriguez OTR GO 1    98568179341 HC OT BIS XTRACELL FLUID ANALYSIS 4/15/2024 Zoraida Rodriguez OTR GO 1            Dictated utilizing Dragon dictation:  Much of this encounter note is an electronic transcription/translation of spoken language to printed text. The electronic translation of spoken language may permit erroneous, or at times, nonsensical words or phrases to be inadvertently transcribed; Although I have reviewed the note for such errors, some may still exist.            AKOSUA Castro  4/15/2024

## 2024-05-07 ENCOUNTER — HOSPITAL ENCOUNTER (OUTPATIENT)
Dept: OCCUPATIONAL THERAPY | Facility: HOSPITAL | Age: 59
Discharge: HOME | End: 2024-05-07
Admitting: INTERNAL MEDICINE
Payer: COMMERCIAL

## 2024-05-07 DIAGNOSIS — M79.602 PAIN OF LEFT UPPER EXTREMITY: ICD-10-CM

## 2024-05-07 DIAGNOSIS — C50.912 MALIGNANT NEOPLASM OF LEFT FEMALE BREAST, UNSPECIFIED ESTROGEN RECEPTOR STATUS, UNSPECIFIED SITE OF BREAST: Primary | ICD-10-CM

## 2024-05-07 DIAGNOSIS — Z91.89 AT RISK FOR LYMPHEDEMA: ICD-10-CM

## 2024-05-07 PROCEDURE — 97535 SELF CARE MNGMENT TRAINING: CPT

## 2024-05-07 PROCEDURE — 97110 THERAPEUTIC EXERCISES: CPT

## 2024-10-20 NOTE — PLAN OF CARE
Problem: Patient Care Overview (Adult)  Goal: Plan of Care Review  Outcome: Ongoing (interventions implemented as appropriate)    11/20/17 0938   Coping/Psychosocial Response Interventions   Plan Of Care Reviewed With patient   Patient Care Overview   Progress no change       Goal: Adult Individualization and Mutuality  Outcome: Ongoing (interventions implemented as appropriate)  Goal: Discharge Needs Assessment  Outcome: Ongoing (interventions implemented as appropriate)    11/20/17 0938   Discharge Needs Assessment   Concerns To Be Addressed no discharge needs identified;denies needs/concerns at this time   Concerns Comments STATES MOTHER WILL ASSIST AT HOME AFTER DISCHARGE         Problem: Perioperative Period (Adult)  Goal: Signs and Symptoms of Listed Potential Problems Will be Absent or Manageable (Perioperative Period)  Outcome: Ongoing (interventions implemented as appropriate)    11/20/17 0938   Perioperative Period   Problems Assessed (Perioperative Period) pain   Problems Present (Perioperative Period) pain           
Problem: Patient Care Overview (Adult)  Goal: Plan of Care Review  Outcome: Outcome(s) achieved Date Met:  11/20/17 11/20/17 9446   Coping/Psychosocial Response Interventions   Plan Of Care Reviewed With patient;mother   Patient Care Overview   Progress improving   Outcome Evaluation   Outcome Summary/Follow up Plan ready for dc       Goal: Adult Individualization and Mutuality  Outcome: Outcome(s) achieved Date Met:  11/20/17  Goal: Discharge Needs Assessment  Outcome: Outcome(s) achieved Date Met:  11/20/17    Problem: Perioperative Period (Adult)  Goal: Signs and Symptoms of Listed Potential Problems Will be Absent or Manageable (Perioperative Period)  Outcome: Outcome(s) achieved Date Met:  11/20/17      
Patient

## 2025-08-19 ENCOUNTER — TRANSCRIBE ORDERS (OUTPATIENT)
Dept: ADMINISTRATIVE | Facility: HOSPITAL | Age: 60
End: 2025-08-19
Payer: COMMERCIAL

## 2025-08-19 DIAGNOSIS — C50.012 MALIGNANT NEOPLASM OF NIPPLE OF LEFT BREAST IN FEMALE, UNSPECIFIED ESTROGEN RECEPTOR STATUS: Primary | ICD-10-CM

## 2025-08-28 ENCOUNTER — HOSPITAL ENCOUNTER (OUTPATIENT)
Dept: MRI IMAGING | Facility: HOSPITAL | Age: 60
Discharge: HOME | End: 2025-08-28
Admitting: STUDENT IN AN ORGANIZED HEALTH CARE EDUCATION/TRAINING PROGRAM
Payer: COMMERCIAL

## 2025-08-28 DIAGNOSIS — C50.012 MALIGNANT NEOPLASM OF NIPPLE OF LEFT BREAST IN FEMALE, UNSPECIFIED ESTROGEN RECEPTOR STATUS: ICD-10-CM

## 2025-08-28 PROCEDURE — 74183 MRI ABD W/O CNTR FLWD CNTR: CPT

## 2025-08-28 PROCEDURE — A9577 INJ MULTIHANCE: HCPCS | Performed by: STUDENT IN AN ORGANIZED HEALTH CARE EDUCATION/TRAINING PROGRAM

## 2025-08-28 PROCEDURE — 25510000002 GADOBENATE DIMEGLUMINE 529 MG/ML SOLUTION: Performed by: STUDENT IN AN ORGANIZED HEALTH CARE EDUCATION/TRAINING PROGRAM

## 2025-08-28 RX ADMIN — GADOBENATE DIMEGLUMINE 15 ML: 529 INJECTION, SOLUTION INTRAVENOUS at 19:57

## (undated) DEVICE — SUT VIC 0 TN 27IN DYED JTN0G

## (undated) DEVICE — 3M™ STERI-STRIP™ REINFORCED ADHESIVE SKIN CLOSURES, R1547, 1/2 IN X 4 IN (12 MM X 100 MM), 6 STRIPS/ENVELOPE: Brand: 3M™ STERI-STRIP™

## (undated) DEVICE — APPL CHLORAPREP W/TINT 26ML ORNG

## (undated) DEVICE — DRP C/ARM 41X74IN

## (undated) DEVICE — CATH IV INSYTE AUTOGARD 14G 1 1/2IN ORNG

## (undated) DEVICE — ENCORE® LATEX ORTHO SIZE 7.5, STERILE LATEX POWDER-FREE SURGICAL GLOVE: Brand: ENCORE

## (undated) DEVICE — SUT MNCRYL PLS ANTIB UD 4/0 PS2 18IN

## (undated) DEVICE — CONTAINER,SPECIMEN,OR STERILE,4OZ: Brand: MEDLINE

## (undated) DEVICE — LOU LAP CHOLE: Brand: MEDLINE INDUSTRIES, INC.

## (undated) DEVICE — EXTENSION SET, MALE LUER LOCK ADAPTER WITH RETRACTABLE COLLAR

## (undated) DEVICE — ENDOPATH XCEL BLADELESS TROCARS WITH STABILITY SLEEVES: Brand: ENDOPATH XCEL

## (undated) DEVICE — ENDOPOUCH RETRIEVER SPECIMEN RETRIEVAL BAGS: Brand: ENDOPOUCH RETRIEVER

## (undated) DEVICE — CATH CHOLANG 4.5F18IN BRGNDY

## (undated) DEVICE — DRAPE,REIN 53X77,STERILE: Brand: MEDLINE

## (undated) DEVICE — 3M™ STERI-STRIP™ COMPOUND BENZOIN TINCTURE 40 BAGS/CARTON 4 CARTONS/CASE C1544: Brand: 3M™ STERI-STRIP™

## (undated) DEVICE — ENDOCUT SCISSOR TIP, DISPOSABLE: Brand: RENEW

## (undated) DEVICE — STPCK 3WY D201 DISCOFIX

## (undated) DEVICE — ENDOPATH PNEUMONEEDLE INSUFFLATION NEEDLES WITH LUER LOCK CONNECTORS 120MM: Brand: ENDOPATH

## (undated) DEVICE — ENDOPATH XCEL UNIVERSAL TROCAR STABLILITY SLEEVES: Brand: ENDOPATH XCEL

## (undated) DEVICE — SOL NACL 0.9PCT 1000ML